# Patient Record
Sex: MALE | Race: WHITE | Employment: FULL TIME | URBAN - METROPOLITAN AREA
[De-identification: names, ages, dates, MRNs, and addresses within clinical notes are randomized per-mention and may not be internally consistent; named-entity substitution may affect disease eponyms.]

---

## 2017-01-03 DIAGNOSIS — I10 ESSENTIAL HYPERTENSION: Primary | ICD-10-CM

## 2017-01-03 RX ORDER — BENAZEPRIL HYDROCHLORIDE 10 MG/1
TABLET ORAL
Qty: 30 TABLET | Refills: 3 | Status: SHIPPED | OUTPATIENT
Start: 2017-01-03 | End: 2017-05-19

## 2017-01-04 ENCOUNTER — TELEPHONE (OUTPATIENT)
Dept: INTERNAL MEDICINE CLINIC | Facility: CLINIC | Age: 52
End: 2017-01-04

## 2017-01-04 DIAGNOSIS — N52.9 ERECTILE DYSFUNCTION, UNSPECIFIED ERECTILE DYSFUNCTION TYPE: Primary | ICD-10-CM

## 2017-01-04 RX ORDER — SILDENAFIL 100 MG/1
100 TABLET, FILM COATED ORAL AS NEEDED
Qty: 8 TABLET | Refills: 3 | Status: SHIPPED | OUTPATIENT
Start: 2017-01-04 | End: 2017-01-11

## 2017-01-04 NOTE — TELEPHONE ENCOUNTER
Spoke with patient and he states we have never sent an actual Rx to pharmacy for Viagra because we have been providing him with samples.    He was advised that insurance usually does not approve for a 30 day supply, but rather a quantity of about 6-12 at a

## 2017-01-05 ENCOUNTER — TELEPHONE (OUTPATIENT)
Dept: INTERNAL MEDICINE CLINIC | Facility: CLINIC | Age: 52
End: 2017-01-05

## 2017-01-05 DIAGNOSIS — N52.9 ERECTILE DYSFUNCTION, UNSPECIFIED ERECTILE DYSFUNCTION TYPE: Primary | ICD-10-CM

## 2017-01-11 RX ORDER — SILDENAFIL 100 MG/1
100 TABLET, FILM COATED ORAL AS NEEDED
Qty: 4 TABLET | Refills: 3 | Status: SHIPPED | OUTPATIENT
Start: 2017-01-11 | End: 2017-11-07

## 2017-01-11 NOTE — TELEPHONE ENCOUNTER
The pharmacy operations department, [facility], Pastor Mar, called and notified the denail of Viagra because it is limited to 4 units per 30 days due to dx provided. Pastor Mar stated our office will receive a fax notification too.  She can be reached at [phone]

## 2017-01-12 ENCOUNTER — OFFICE VISIT (OUTPATIENT)
Dept: HEMATOLOGY/ONCOLOGY | Facility: HOSPITAL | Age: 52
End: 2017-01-12
Attending: INTERNAL MEDICINE
Payer: COMMERCIAL

## 2017-01-12 VITALS
HEART RATE: 96 BPM | BODY MASS INDEX: 31.93 KG/M2 | TEMPERATURE: 98 F | RESPIRATION RATE: 18 BRPM | WEIGHT: 248.81 LBS | SYSTOLIC BLOOD PRESSURE: 119 MMHG | HEIGHT: 74.02 IN | DIASTOLIC BLOOD PRESSURE: 82 MMHG

## 2017-01-12 DIAGNOSIS — R59.9 ADENOPATHY: Primary | ICD-10-CM

## 2017-01-12 DIAGNOSIS — C91.10 CLL (CHRONIC LYMPHOCYTIC LEUKEMIA) (HCC): ICD-10-CM

## 2017-01-12 DIAGNOSIS — R16.2 HEPATOSPLENOMEGALY: ICD-10-CM

## 2017-01-12 DIAGNOSIS — D72.820 LYMPHOCYTOSIS: ICD-10-CM

## 2017-01-12 LAB
ALBUMIN SERPL-MCNC: 4.3 G/DL (ref 3.5–4.8)
ALP LIVER SERPL-CCNC: 77 U/L (ref 45–117)
ALT SERPL-CCNC: 30 U/L (ref 17–63)
AST SERPL-CCNC: 22 U/L (ref 15–41)
BASOPHILS # BLD AUTO: 0.22 X10(3) UL (ref 0–0.1)
BASOPHILS NFR BLD AUTO: 0.2 %
BILIRUB SERPL-MCNC: 0.4 MG/DL (ref 0.1–2)
BUN BLD-MCNC: 18 MG/DL (ref 8–20)
CALCIUM BLD-MCNC: 8.6 MG/DL (ref 8.3–10.3)
CHLORIDE: 106 MMOL/L (ref 101–111)
CO2: 24 MMOL/L (ref 22–32)
CREAT BLD-MCNC: 0.96 MG/DL (ref 0.7–1.3)
EOSINOPHIL # BLD AUTO: 0.53 X10(3) UL (ref 0–0.3)
EOSINOPHIL NFR BLD AUTO: 0.5 %
ERYTHROCYTE [DISTWIDTH] IN BLOOD BY AUTOMATED COUNT: 12.5 % (ref 11.5–16)
GLUCOSE BLD-MCNC: 111 MG/DL (ref 70–99)
HCT VFR BLD AUTO: 42.8 % (ref 37–53)
HGB BLD-MCNC: 14 G/DL (ref 13–17)
IMMATURE GRANULOCYTE COUNT: 0.15 X10(3) UL (ref 0–1)
IMMATURE GRANULOCYTE RATIO %: 0.2 %
LDH: 175 U/L (ref 84–249)
LYMPHOCYTES # BLD AUTO: 90.53 X10(3) UL (ref 0.9–4)
LYMPHOCYTES NFR BLD AUTO: 92.6 %
M PROTEIN MFR SERPL ELPH: 6.9 G/DL (ref 6.1–8.3)
MCH RBC QN AUTO: 29.5 PG (ref 27–33.2)
MCHC RBC AUTO-ENTMCNC: 32.7 G/DL (ref 31–37)
MCV RBC AUTO: 90.1 FL (ref 80–99)
MONOCYTES # BLD AUTO: 1.66 X10(3) UL (ref 0.1–0.6)
MONOCYTES NFR BLD AUTO: 1.7 %
NEUTROPHIL ABS PRELIM: 4.69 X10 (3) UL (ref 1.3–6.7)
NEUTROPHILS # BLD AUTO: 4.69 X10(3) UL (ref 1.3–6.7)
NEUTROPHILS NFR BLD AUTO: 4.8 %
PLATELET # BLD AUTO: 231 10(3)UL (ref 150–450)
PLATELET MORPHOLOGY: NORMAL
POTASSIUM SERPL-SCNC: 4.8 MMOL/L (ref 3.6–5.1)
RBC # BLD AUTO: 4.75 X10(6)UL (ref 4.3–5.7)
RED CELL DISTRIBUTION WIDTH-SD: 41 FL (ref 35.1–46.3)
SODIUM SERPL-SCNC: 139 MMOL/L (ref 136–144)
WBC # BLD AUTO: 97.8 X10(3) UL (ref 4–13)

## 2017-01-12 PROCEDURE — 99214 OFFICE O/P EST MOD 30 MIN: CPT | Performed by: INTERNAL MEDICINE

## 2017-01-12 NOTE — PROGRESS NOTES
Education Record    Learner:  Patient    Disease / Diagnosis:    Barriers / Limitations:  None   Comments:    Method:  Reinforcement   Comments:    General Topics:  Plan of care reviewed   Comments:    Outcome:  Shows understanding   Comments:

## 2017-01-12 NOTE — PROGRESS NOTES
Cancer Center Progress Note    Patient Name: Landen Manley   YOB: 1965   Medical Record Number: [id number]   CSN: [id number]   Attending Physician: Jerardo Zamora M.D.    Referring Physician: Gilda Chen MD      Date of Visit: 1/12/2017     ANA 90 tablet, Rfl: 1  •  Multiple Vitamins-Minerals (CENTRUM SILVER ULTRA MENS) Oral Tab, Take  by mouth., Disp: , Rfl:   •  Blood Glucose Monitoring Suppl (CONTOUR BLOOD GLUCOSE SYSTEM) Does not apply Device, by Does not apply route., Disp: , Rfl:   •  Gluco Social History Narrative         Allergies:    Codeine                     Comment:nausea  Dust                    Shortness of Breath  Mold                    Shortness of Breath  Pollen                  Shortness of Breath     Review of Systems:  A 1 Range: 13.0-17.0 g/dL 14.0   Hematocrit Latest Ref Range: 37.0-53.0 % 42.8   Platelet Count Latest Ref Range: 150.0-450.0 10(3)uL 231.0   RBC Latest Ref Range: 4.30-5.70 x10(6)uL 4.75   Mean Corpuscular Hemoglobin Latest Ref Range: 27.0-33.2 pg 29.5   Mean or depression. We discussed issues of distress, coping difficulties and social support systems and currently there are no active problems.     Maurice Graves MD

## 2017-01-19 ENCOUNTER — TELEPHONE (OUTPATIENT)
Dept: INTERNAL MEDICINE CLINIC | Facility: CLINIC | Age: 52
End: 2017-01-19

## 2017-01-19 NOTE — TELEPHONE ENCOUNTER
PA completed 1/5/2017:     The pharmacy operations department, [facility], Darlene Baltazar, called and notified the denail of Viagra because it is limited to 4 units per 30 days due to dx provided. Darlene Baltazar stated our office will receive a fax notification too.  She can

## 2017-02-02 DIAGNOSIS — J45.40 MODERATE PERSISTENT ASTHMA WITHOUT COMPLICATION: Primary | ICD-10-CM

## 2017-02-02 RX ORDER — FLUTICASONE PROPIONATE 250 UG/1
POWDER, METERED RESPIRATORY (INHALATION)
Qty: 60 EACH | Refills: 1 | Status: SHIPPED | OUTPATIENT
Start: 2017-02-02 | End: 2017-05-19

## 2017-03-23 ENCOUNTER — TELEPHONE (OUTPATIENT)
Dept: INTERNAL MEDICINE CLINIC | Facility: CLINIC | Age: 52
End: 2017-03-23

## 2017-03-23 DIAGNOSIS — I10 ESSENTIAL HYPERTENSION: ICD-10-CM

## 2017-03-23 DIAGNOSIS — E11.9 TYPE 2 DIABETES MELLITUS WITHOUT COMPLICATION, WITHOUT LONG-TERM CURRENT USE OF INSULIN (HCC): Primary | ICD-10-CM

## 2017-03-23 DIAGNOSIS — C91.10 CLL (CHRONIC LYMPHOCYTIC LEUKEMIA) (HCC): ICD-10-CM

## 2017-03-23 DIAGNOSIS — E78.2 MIXED HYPERLIPIDEMIA: ICD-10-CM

## 2017-03-23 DIAGNOSIS — N52.9 ERECTILE DYSFUNCTION, UNSPECIFIED ERECTILE DYSFUNCTION TYPE: ICD-10-CM

## 2017-03-23 NOTE — TELEPHONE ENCOUNTER
Pt needs orders placed for his lab work - the orders on file are from another doctor's office.  Please call pt when the orders are in

## 2017-04-01 ENCOUNTER — LAB ENCOUNTER (OUTPATIENT)
Dept: LAB | Age: 52
End: 2017-04-01
Attending: INTERNAL MEDICINE
Payer: COMMERCIAL

## 2017-04-01 DIAGNOSIS — N52.9 ERECTILE DYSFUNCTION, UNSPECIFIED ERECTILE DYSFUNCTION TYPE: ICD-10-CM

## 2017-04-01 DIAGNOSIS — E78.2 MIXED HYPERLIPIDEMIA: ICD-10-CM

## 2017-04-01 DIAGNOSIS — I10 ESSENTIAL HYPERTENSION: ICD-10-CM

## 2017-04-01 DIAGNOSIS — E11.9 TYPE 2 DIABETES MELLITUS WITHOUT COMPLICATION, WITHOUT LONG-TERM CURRENT USE OF INSULIN (HCC): ICD-10-CM

## 2017-04-01 DIAGNOSIS — C91.10 CLL (CHRONIC LYMPHOCYTIC LEUKEMIA) (HCC): ICD-10-CM

## 2017-04-01 PROCEDURE — 80053 COMPREHEN METABOLIC PANEL: CPT | Performed by: INTERNAL MEDICINE

## 2017-04-01 PROCEDURE — 36415 COLL VENOUS BLD VENIPUNCTURE: CPT | Performed by: INTERNAL MEDICINE

## 2017-04-01 PROCEDURE — 85025 COMPLETE CBC W/AUTO DIFF WBC: CPT | Performed by: INTERNAL MEDICINE

## 2017-04-01 PROCEDURE — 81003 URINALYSIS AUTO W/O SCOPE: CPT | Performed by: INTERNAL MEDICINE

## 2017-04-01 PROCEDURE — 84153 ASSAY OF PSA TOTAL: CPT | Performed by: INTERNAL MEDICINE

## 2017-04-01 PROCEDURE — 82043 UR ALBUMIN QUANTITATIVE: CPT | Performed by: INTERNAL MEDICINE

## 2017-04-01 PROCEDURE — 82570 ASSAY OF URINE CREATININE: CPT | Performed by: INTERNAL MEDICINE

## 2017-04-01 PROCEDURE — 80061 LIPID PANEL: CPT | Performed by: INTERNAL MEDICINE

## 2017-04-14 ENCOUNTER — APPOINTMENT (OUTPATIENT)
Dept: LAB | Age: 52
End: 2017-04-14
Attending: INTERNAL MEDICINE
Payer: COMMERCIAL

## 2017-04-14 ENCOUNTER — OFFICE VISIT (OUTPATIENT)
Dept: INTERNAL MEDICINE CLINIC | Facility: CLINIC | Age: 52
End: 2017-04-14

## 2017-04-14 VITALS
OXYGEN SATURATION: 98 % | TEMPERATURE: 98 F | HEIGHT: 74.02 IN | WEIGHT: 250 LBS | BODY MASS INDEX: 32.08 KG/M2 | RESPIRATION RATE: 16 BRPM | SYSTOLIC BLOOD PRESSURE: 100 MMHG | DIASTOLIC BLOOD PRESSURE: 64 MMHG | HEART RATE: 88 BPM

## 2017-04-14 DIAGNOSIS — E11.9 TYPE 2 DIABETES MELLITUS WITHOUT COMPLICATION, WITHOUT LONG-TERM CURRENT USE OF INSULIN (HCC): ICD-10-CM

## 2017-04-14 DIAGNOSIS — Z00.00 PHYSICAL EXAM, ANNUAL: Primary | ICD-10-CM

## 2017-04-14 DIAGNOSIS — E78.2 MIXED HYPERLIPIDEMIA: ICD-10-CM

## 2017-04-14 DIAGNOSIS — J45.40 MODERATE PERSISTENT ASTHMA WITHOUT COMPLICATION: ICD-10-CM

## 2017-04-14 PROCEDURE — 36415 COLL VENOUS BLD VENIPUNCTURE: CPT | Performed by: INTERNAL MEDICINE

## 2017-04-14 PROCEDURE — 99396 PREV VISIT EST AGE 40-64: CPT | Performed by: INTERNAL MEDICINE

## 2017-04-14 PROCEDURE — 83036 HEMOGLOBIN GLYCOSYLATED A1C: CPT | Performed by: INTERNAL MEDICINE

## 2017-04-14 RX ORDER — ATORVASTATIN CALCIUM 40 MG/1
40 TABLET, FILM COATED ORAL DAILY
Qty: 90 TABLET | Refills: 1 | Status: SHIPPED | OUTPATIENT
Start: 2017-04-14 | End: 2017-10-12

## 2017-04-14 NOTE — PATIENT INSTRUCTIONS
Prevention Guidelines, Men Ages 48 to 59  Screening tests and vaccines are an important part of managing your health. Health counseling is essential, too. Below are guidelines for these, for men ages 48 to 59.  Talk with your healthcare provider to make s Syphilis Men at increased risk for infection – talk with your healthcare provider At routine exams   Tuberculosis Men at increased risk for infection – talk with your healthcare provider Ask your healthcare provider   Vision All men in this age group Ask y Counseling Who needs it How often   Diet and exercise Men who are overweight or obese When diagnosed, and then at routine exams   Sexually transmitted infection prevention Men at increased risk for infection – talk with your healthcare provider At routine

## 2017-04-14 NOTE — PROGRESS NOTES
HPI:    Patient ID: Aixa Delgado is a 46year old male. HPI  Aixa Delgado is a 46year old male who presents for a complete physical exam.   HPI:   Pt complains of nothing  Doing well on dm2 therapy.  No hypoglycemia  CLL managed by Dr. Enrique morgan HFA) 108 (90 BASE) MCG/ACT Inhalation Aero Soln INHALE 2 PUFFS EVERY 6 HOURS AS NEEDED FOR WHEEZING Disp: 1 Inhaler Rfl: 0   MetFORMIN HCl 850 MG Oral Tab TAKE 1 TABLET BY MOUTH TWO TIMES A DAY WITH MEALS Disp: 180 tablet Rfl: 1   Benazepril HCl 10 MG Oral daily - does not inhale    Alcohol Use: Yes                Comment: 4-6 Beers/week     Occ: yes. : yes. .   Exercise: once per week,  twice per week.   Diet: watches minimally     REVIEW OF SYSTEMS:   GENERAL: feels well otherwise  SKIN: denies any u exercise 30 minutes three times weekly. Health maintenance, reviewed fasting Lipids, CMP, CBC and PSA. Check a1c. UTD with screening colonoscopy. Pt info handouts given for: exercise, low fat diet, testicular self exam and prostate cancer screening.  The pa Comment:nausea  Dust                    Shortness of Breath  Mold                    Shortness of Breath  Pollen                  Shortness of Breath   PHYSICAL EXAM:   Physical Exam           ASSESSMENT/PLAN:   Physical exam, annual  (primary encounter di

## 2017-05-02 ENCOUNTER — TELEPHONE (OUTPATIENT)
Dept: INTERNAL MEDICINE CLINIC | Facility: CLINIC | Age: 52
End: 2017-05-02

## 2017-05-02 NOTE — TELEPHONE ENCOUNTER
Reviewed with the pt asthma Rx treatment. A copy of the AAP will be mailed to the pt's home address once the plan is approved by Dr. Zuleima Hernandez.

## 2017-05-19 DIAGNOSIS — E11.9 TYPE 2 DIABETES MELLITUS WITHOUT COMPLICATION, WITHOUT LONG-TERM CURRENT USE OF INSULIN (HCC): ICD-10-CM

## 2017-05-19 DIAGNOSIS — I10 ESSENTIAL HYPERTENSION: Primary | ICD-10-CM

## 2017-05-19 DIAGNOSIS — J45.40 MODERATE PERSISTENT ASTHMA WITHOUT COMPLICATION: ICD-10-CM

## 2017-05-19 RX ORDER — BENAZEPRIL HYDROCHLORIDE 10 MG/1
TABLET ORAL
Qty: 30 TABLET | Refills: 2 | Status: SHIPPED | OUTPATIENT
Start: 2017-05-19 | End: 2017-06-06

## 2017-05-19 RX ORDER — FLUTICASONE PROPIONATE 250 UG/1
POWDER, METERED RESPIRATORY (INHALATION)
Qty: 60 EACH | Refills: 2 | Status: SHIPPED | OUTPATIENT
Start: 2017-05-19 | End: 2017-11-28

## 2017-06-06 ENCOUNTER — APPOINTMENT (OUTPATIENT)
Dept: CT IMAGING | Age: 52
End: 2017-06-06
Attending: EMERGENCY MEDICINE
Payer: COMMERCIAL

## 2017-06-06 ENCOUNTER — HOSPITAL ENCOUNTER (EMERGENCY)
Age: 52
Discharge: HOME | End: 2017-06-06
Attending: EMERGENCY MEDICINE
Payer: COMMERCIAL

## 2017-06-06 ENCOUNTER — OFFICE VISIT (OUTPATIENT)
Dept: FAMILY MEDICINE CLINIC | Facility: CLINIC | Age: 52
End: 2017-06-06

## 2017-06-06 VITALS
TEMPERATURE: 98 F | HEART RATE: 74 BPM | SYSTOLIC BLOOD PRESSURE: 111 MMHG | WEIGHT: 250 LBS | RESPIRATION RATE: 18 BRPM | BODY MASS INDEX: 32.08 KG/M2 | DIASTOLIC BLOOD PRESSURE: 64 MMHG | OXYGEN SATURATION: 100 % | HEIGHT: 74 IN

## 2017-06-06 VITALS
TEMPERATURE: 98 F | WEIGHT: 253 LBS | RESPIRATION RATE: 16 BRPM | HEART RATE: 86 BPM | BODY MASS INDEX: 32 KG/M2 | OXYGEN SATURATION: 99 % | SYSTOLIC BLOOD PRESSURE: 122 MMHG | DIASTOLIC BLOOD PRESSURE: 74 MMHG

## 2017-06-06 DIAGNOSIS — Z02.9 ENCOUNTERS FOR ADMINISTRATIVE PURPOSE: Primary | ICD-10-CM

## 2017-06-06 DIAGNOSIS — N23 RENAL COLIC: Primary | ICD-10-CM

## 2017-06-06 PROCEDURE — 99284 EMERGENCY DEPT VISIT MOD MDM: CPT

## 2017-06-06 PROCEDURE — 96361 HYDRATE IV INFUSION ADD-ON: CPT

## 2017-06-06 PROCEDURE — 80048 BASIC METABOLIC PNL TOTAL CA: CPT | Performed by: EMERGENCY MEDICINE

## 2017-06-06 PROCEDURE — 74176 CT ABD & PELVIS W/O CONTRAST: CPT | Performed by: EMERGENCY MEDICINE

## 2017-06-06 PROCEDURE — 96374 THER/PROPH/DIAG INJ IV PUSH: CPT

## 2017-06-06 PROCEDURE — 81001 URINALYSIS AUTO W/SCOPE: CPT | Performed by: EMERGENCY MEDICINE

## 2017-06-06 RX ORDER — ONDANSETRON 2 MG/ML
4 INJECTION INTRAMUSCULAR; INTRAVENOUS
Status: DISCONTINUED | OUTPATIENT
Start: 2017-06-06 | End: 2017-06-06

## 2017-06-06 RX ORDER — HYDROMORPHONE HYDROCHLORIDE 1 MG/ML
1 INJECTION, SOLUTION INTRAMUSCULAR; INTRAVENOUS; SUBCUTANEOUS EVERY 30 MIN PRN
Status: DISCONTINUED | OUTPATIENT
Start: 2017-06-06 | End: 2017-06-06

## 2017-06-06 RX ORDER — SODIUM CHLORIDE 9 MG/ML
INJECTION, SOLUTION INTRAVENOUS ONCE
Status: COMPLETED | OUTPATIENT
Start: 2017-06-06 | End: 2017-06-06

## 2017-06-06 RX ORDER — TAMSULOSIN HYDROCHLORIDE 0.4 MG/1
0.4 CAPSULE ORAL DAILY
Qty: 7 CAPSULE | Refills: 0 | Status: SHIPPED | OUTPATIENT
Start: 2017-06-06 | End: 2017-06-13

## 2017-06-06 RX ORDER — ONDANSETRON 8 MG/1
8 TABLET, ORALLY DISINTEGRATING ORAL EVERY 6 HOURS PRN
Qty: 10 TABLET | Refills: 0 | Status: SHIPPED | OUTPATIENT
Start: 2017-06-06 | End: 2017-06-13

## 2017-06-06 RX ORDER — HYDROCODONE BITARTRATE AND ACETAMINOPHEN 5; 325 MG/1; MG/1
1-2 TABLET ORAL EVERY 6 HOURS PRN
Qty: 20 TABLET | Refills: 0 | Status: SHIPPED | OUTPATIENT
Start: 2017-06-06 | End: 2017-06-16

## 2017-06-06 NOTE — ED PROVIDER NOTES
Patient Seen in: [facility] In [place]    History   Patient presents with:  Abdomen/Flank Pain (GI/)    Stated Complaint: lower back pain this am, now abd pain into testicle    HPI    Patient awoke this morning with some lower back Oral Tab,  TAKE 1 TABLET BY MOUTH TWO TIMES A DAY WITH MEALS   FLOVENT DISKUS 250 MCG/BLIST Inhalation Aerosol Powder, Breath Activated,  INHALE 1 PUFF BY MOUTH TWO TIMES A DAY    Atorvastatin Calcium 40 MG Oral Tab,  Take 1 tablet (40 mg total) by mouth d °C) (Temporal)  Resp 18  Ht 188 cm (6' 2\")  Wt 113.399 kg  BMI 32.08 kg/m2  SpO2 100%        Physical Exam  General: The patient is awake, alert, conversant. Patient answers questions quickly and appropriately.   Eyes: sclera white, conjunctiva pink and m oncologist.  Patient is already scheduled appointments for these    On repeat examination, patient appeared more comfortable. At this point, I believe he may be safely discharged home.   Recommend rest, fluids, straining urine, anti-inflammatory ibuprofen

## 2017-06-06 NOTE — PROGRESS NOTES
Patient reports acute onset of left sided flank pain radiating to left groin and left scrotum. Patient also reports some mild dizziness that was improved with water.   Pt denies fever, urinary pain/frequency/urgency, penile discharge, scrotal edema or eryt

## 2017-07-13 ENCOUNTER — TELEPHONE (OUTPATIENT)
Dept: HEMATOLOGY/ONCOLOGY | Facility: HOSPITAL | Age: 52
End: 2017-07-13

## 2017-07-13 ENCOUNTER — OFFICE VISIT (OUTPATIENT)
Dept: HEMATOLOGY/ONCOLOGY | Facility: HOSPITAL | Age: 52
End: 2017-07-13
Attending: INTERNAL MEDICINE
Payer: COMMERCIAL

## 2017-07-13 VITALS
BODY MASS INDEX: 31.83 KG/M2 | DIASTOLIC BLOOD PRESSURE: 74 MMHG | SYSTOLIC BLOOD PRESSURE: 124 MMHG | HEIGHT: 74.02 IN | WEIGHT: 248 LBS | OXYGEN SATURATION: 97 % | HEART RATE: 90 BPM | RESPIRATION RATE: 18 BRPM | TEMPERATURE: 98 F

## 2017-07-13 DIAGNOSIS — D72.820 LYMPHOCYTOSIS: ICD-10-CM

## 2017-07-13 DIAGNOSIS — R59.9 ADENOPATHY: Primary | ICD-10-CM

## 2017-07-13 DIAGNOSIS — R16.2 HEPATOSPLENOMEGALY: ICD-10-CM

## 2017-07-13 DIAGNOSIS — C91.10 CLL (CHRONIC LYMPHOCYTIC LEUKEMIA) (HCC): ICD-10-CM

## 2017-07-13 LAB
ALBUMIN SERPL-MCNC: 4.3 G/DL (ref 3.5–4.8)
ALP LIVER SERPL-CCNC: 74 U/L (ref 45–117)
ALT SERPL-CCNC: 32 U/L (ref 17–63)
AST SERPL-CCNC: 20 U/L (ref 15–41)
BASOPHILS # BLD AUTO: 0.31 X10(3) UL (ref 0–0.1)
BASOPHILS NFR BLD AUTO: 0.3 %
BILIRUB SERPL-MCNC: 0.4 MG/DL (ref 0.1–2)
BUN BLD-MCNC: 20 MG/DL (ref 8–20)
CALCIUM BLD-MCNC: 9.2 MG/DL (ref 8.3–10.3)
CHLORIDE: 107 MMOL/L (ref 101–111)
CO2: 28 MMOL/L (ref 22–32)
CREAT BLD-MCNC: 1.01 MG/DL (ref 0.7–1.3)
EOSINOPHIL # BLD AUTO: 0.48 X10(3) UL (ref 0–0.3)
EOSINOPHIL NFR BLD AUTO: 0.5 %
ERYTHROCYTE [DISTWIDTH] IN BLOOD BY AUTOMATED COUNT: 12.3 % (ref 11.5–16)
GLUCOSE BLD-MCNC: 111 MG/DL (ref 70–99)
HCT VFR BLD AUTO: 42.3 % (ref 37–53)
HGB BLD-MCNC: 13.6 G/DL (ref 13–17)
IMMATURE GRANULOCYTE COUNT: 0.16 X10(3) UL (ref 0–1)
IMMATURE GRANULOCYTE RATIO %: 0.2 %
LDH: 158 U/L (ref 84–249)
LYMPHOCYTES # BLD AUTO: 97.25 X10(3) UL (ref 0.9–4)
LYMPHOCYTES NFR BLD AUTO: 91.9 %
M PROTEIN MFR SERPL ELPH: 7 G/DL (ref 6.1–8.3)
MCH RBC QN AUTO: 29.1 PG (ref 27–33.2)
MCHC RBC AUTO-ENTMCNC: 32.2 G/DL (ref 31–37)
MCV RBC AUTO: 90.4 FL (ref 80–99)
MONOCYTES # BLD AUTO: 2.81 X10(3) UL (ref 0.1–0.6)
MONOCYTES NFR BLD AUTO: 2.7 %
NEUTROPHIL ABS PRELIM: 4.79 X10 (3) UL (ref 1.3–6.7)
NEUTROPHILS # BLD AUTO: 4.79 X10(3) UL (ref 1.3–6.7)
NEUTROPHILS NFR BLD AUTO: 4.4 %
PLATELET # BLD AUTO: 209 10(3)UL (ref 150–450)
POTASSIUM SERPL-SCNC: 4.8 MMOL/L (ref 3.6–5.1)
RBC # BLD AUTO: 4.68 X10(6)UL (ref 4.3–5.7)
RED CELL DISTRIBUTION WIDTH-SD: 40.6 FL (ref 35.1–46.3)
SODIUM SERPL-SCNC: 140 MMOL/L (ref 136–144)
WBC # BLD AUTO: 105.8 X10(3) UL (ref 4–13)

## 2017-07-13 PROCEDURE — 99214 OFFICE O/P EST MOD 30 MIN: CPT | Performed by: INTERNAL MEDICINE

## 2017-07-13 NOTE — PROGRESS NOTES
Cancer Center Progress Note    Patient Name: Gricel Faust   YOB: 1965   Medical Record Number: [id number]   CSN: [id number]   Attending Physician: Delon Kaplan M.D.    Referring Physician: Salome Saha MD      Date of Visit: 7/13/2017     ANA Rfl: 3  •  Benazepril HCl 10 MG Oral Tab, TAKE 1 TABLET BY MOUTH ONE TIME A DAY, Disp: 180 tablet, Rfl: 1  •  aspirin 81 MG Oral Tab, Take 1 tablet by mouth daily. , Disp: , Rfl: 0  •  Fexofenadine HCl (WAL-FEX ALLERGY) 180 MG Oral Tab, Take 1 tablet by rigoberto Allergies:    Codeine                     Comment:nausea  Dust                    Shortness of Breath  Mold                    Shortness of Breath  Pollen                  Shortness of Breath     Review of Systems:  A 14-point ROS was done with per unremarkable. ADRENALS:  Normal.  No mass or enlargement. LIVER:  Low attenuation diffusely throughout the liver consistent with attrition. BILIARY:  Normal.  No visible dilatation or calcification.     PANCREAS:  Normal.  No lesion, fluid collection, mmol/L 28.0   BUN Latest Ref Range: 8 - 20 mg/dL 20   CREATININE Latest Ref Range: 0.70 - 1.30 mg/dL 1.01   CALCIUM Latest Ref Range: 8.3 - 10.3 mg/dL 9.2   GFR Latest Ref Range: >=60  85   ALKALINE PHOSPHATASE Latest Ref Range: 45 - 117 U/L 74   AST (SGOT recent imaging reviewed. Continued increase in white count though doubling time has slowed down and rest of blood counts normal. HSM and nodes stable on recent CT w/o evidence of end organ damage.  Aware of signs and symptoms to watch out for and indication

## 2017-08-22 DIAGNOSIS — I10 ESSENTIAL HYPERTENSION WITH GOAL BLOOD PRESSURE LESS THAN 130/80: ICD-10-CM

## 2017-08-22 DIAGNOSIS — E11.9 TYPE 2 DIABETES MELLITUS WITHOUT COMPLICATION, WITHOUT LONG-TERM CURRENT USE OF INSULIN (HCC): ICD-10-CM

## 2017-08-22 RX ORDER — BENAZEPRIL HYDROCHLORIDE 10 MG/1
TABLET ORAL
Qty: 30 TABLET | Refills: 2 | Status: SHIPPED | OUTPATIENT
Start: 2017-08-22 | End: 2017-11-22

## 2017-09-27 ENCOUNTER — TELEPHONE (OUTPATIENT)
Dept: INTERNAL MEDICINE CLINIC | Facility: CLINIC | Age: 52
End: 2017-09-27

## 2017-09-27 DIAGNOSIS — I10 ESSENTIAL HYPERTENSION: Primary | ICD-10-CM

## 2017-09-27 DIAGNOSIS — E78.2 MIXED HYPERLIPIDEMIA: ICD-10-CM

## 2017-09-27 DIAGNOSIS — E11.9 TYPE 2 DIABETES MELLITUS WITHOUT COMPLICATION, WITHOUT LONG-TERM CURRENT USE OF INSULIN (HCC): ICD-10-CM

## 2017-10-07 ENCOUNTER — APPOINTMENT (OUTPATIENT)
Dept: LAB | Age: 52
End: 2017-10-07
Attending: INTERNAL MEDICINE
Payer: COMMERCIAL

## 2017-10-07 DIAGNOSIS — E78.2 MIXED HYPERLIPIDEMIA: ICD-10-CM

## 2017-10-07 DIAGNOSIS — E11.9 TYPE 2 DIABETES MELLITUS WITHOUT COMPLICATION, WITHOUT LONG-TERM CURRENT USE OF INSULIN (HCC): ICD-10-CM

## 2017-10-07 DIAGNOSIS — I10 ESSENTIAL HYPERTENSION: ICD-10-CM

## 2017-10-07 PROCEDURE — 80061 LIPID PANEL: CPT | Performed by: INTERNAL MEDICINE

## 2017-10-07 PROCEDURE — 83036 HEMOGLOBIN GLYCOSYLATED A1C: CPT | Performed by: INTERNAL MEDICINE

## 2017-10-07 PROCEDURE — 36415 COLL VENOUS BLD VENIPUNCTURE: CPT | Performed by: INTERNAL MEDICINE

## 2017-10-07 PROCEDURE — 80053 COMPREHEN METABOLIC PANEL: CPT | Performed by: INTERNAL MEDICINE

## 2017-10-12 ENCOUNTER — OFFICE VISIT (OUTPATIENT)
Dept: INTERNAL MEDICINE CLINIC | Facility: CLINIC | Age: 52
End: 2017-10-12

## 2017-10-12 VITALS
HEIGHT: 74 IN | TEMPERATURE: 98 F | SYSTOLIC BLOOD PRESSURE: 112 MMHG | RESPIRATION RATE: 16 BRPM | DIASTOLIC BLOOD PRESSURE: 74 MMHG | BODY MASS INDEX: 31.32 KG/M2 | OXYGEN SATURATION: 98 % | WEIGHT: 244 LBS | HEART RATE: 87 BPM

## 2017-10-12 DIAGNOSIS — I10 ESSENTIAL HYPERTENSION: ICD-10-CM

## 2017-10-12 DIAGNOSIS — Z23 NEED FOR INFLUENZA VACCINATION: ICD-10-CM

## 2017-10-12 DIAGNOSIS — E11.9 TYPE 2 DIABETES MELLITUS WITHOUT COMPLICATION, WITHOUT LONG-TERM CURRENT USE OF INSULIN (HCC): Primary | ICD-10-CM

## 2017-10-12 DIAGNOSIS — J45.40 MODERATE PERSISTENT ASTHMA WITHOUT COMPLICATION: ICD-10-CM

## 2017-10-12 DIAGNOSIS — E78.2 MIXED HYPERLIPIDEMIA: ICD-10-CM

## 2017-10-12 PROCEDURE — 90471 IMMUNIZATION ADMIN: CPT | Performed by: INTERNAL MEDICINE

## 2017-10-12 PROCEDURE — 99214 OFFICE O/P EST MOD 30 MIN: CPT | Performed by: INTERNAL MEDICINE

## 2017-10-12 PROCEDURE — 90686 IIV4 VACC NO PRSV 0.5 ML IM: CPT | Performed by: INTERNAL MEDICINE

## 2017-10-12 RX ORDER — ATORVASTATIN CALCIUM 40 MG/1
40 TABLET, FILM COATED ORAL DAILY
Qty: 90 TABLET | Refills: 1 | Status: SHIPPED | OUTPATIENT
Start: 2017-10-12 | End: 2018-04-20

## 2017-10-13 NOTE — PROGRESS NOTES
HPI:    Patient ID: Enrico Orantes is a 46year old male. HPI  HPI:   Enrico Orantes is a 46year old male who presents for recheck of his diabetes, htn and hld and asthma. Patient’s FBS have been 115's. Last visit with ophthalmologist was 7 m ago.   Pt has Rfl:    FLOVENT DISKUS 250 MCG/BLIST Inhalation Aerosol Powder, Breath Activated INHALE 1 PUFF BY MOUTH TWO TIMES A DAY  Disp: 60 each Rfl: 2   Sildenafil Citrate (VIAGRA) 100 MG Oral Tab Take 1 tablet (100 mg total) by mouth as needed for Erectile Dysfunc 97.7 °F (36.5 °C) (Oral)   Resp 16   Ht 74\"   Wt 244 lb   SpO2 98%   BMI 31.33 kg/m²   GENERAL: well developed, well nourished,in no apparent distress  SKIN: no rashes,no suspicious lesions  NECK: supple,no adenopathy,no bruits  LUNGS: clear to auscultati Take 1 tablet by mouth daily. Disp:  Rfl: 0   Fexofenadine HCl (WAL-FEX ALLERGY) 180 MG Oral Tab Take 1 tablet by mouth once daily. Disp: 90 tablet Rfl: 1   Multiple Vitamins-Minerals (CENTRUM SILVER ULTRA MENS) Oral Tab Take  by mouth.  Disp:  Rfl:      Al

## 2017-10-16 ENCOUNTER — MED REC SCAN ONLY (OUTPATIENT)
Dept: INTERNAL MEDICINE CLINIC | Facility: CLINIC | Age: 52
End: 2017-10-16

## 2017-11-07 DIAGNOSIS — N52.9 ERECTILE DYSFUNCTION, UNSPECIFIED ERECTILE DYSFUNCTION TYPE: ICD-10-CM

## 2017-11-07 RX ORDER — SILDENAFIL 100 MG/1
100 TABLET, FILM COATED ORAL AS NEEDED
Qty: 6 TABLET | Refills: 0 | COMMUNITY
Start: 2017-11-07 | End: 2018-04-20

## 2017-11-07 NOTE — TELEPHONE ENCOUNTER
Patient called and requested Viagra samples. Please call to let patient know if we have any in stock.

## 2017-11-22 DIAGNOSIS — I10 ESSENTIAL HYPERTENSION WITH GOAL BLOOD PRESSURE LESS THAN 130/80: ICD-10-CM

## 2017-11-22 DIAGNOSIS — E11.9 TYPE 2 DIABETES MELLITUS WITHOUT COMPLICATION, WITHOUT LONG-TERM CURRENT USE OF INSULIN (HCC): ICD-10-CM

## 2017-11-22 RX ORDER — BENAZEPRIL HYDROCHLORIDE 10 MG/1
TABLET ORAL
Qty: 30 TABLET | Refills: 5 | Status: SHIPPED | OUTPATIENT
Start: 2017-11-22 | End: 2018-04-20

## 2017-11-28 DIAGNOSIS — J45.40 MODERATE PERSISTENT ASTHMA WITHOUT COMPLICATION: ICD-10-CM

## 2018-01-11 ENCOUNTER — OFFICE VISIT (OUTPATIENT)
Dept: HEMATOLOGY/ONCOLOGY | Facility: HOSPITAL | Age: 53
End: 2018-01-11
Attending: INTERNAL MEDICINE
Payer: COMMERCIAL

## 2018-01-11 VITALS
HEIGHT: 74.02 IN | BODY MASS INDEX: 30.93 KG/M2 | TEMPERATURE: 97 F | RESPIRATION RATE: 18 BRPM | SYSTOLIC BLOOD PRESSURE: 133 MMHG | DIASTOLIC BLOOD PRESSURE: 70 MMHG | HEART RATE: 87 BPM | WEIGHT: 241 LBS

## 2018-01-11 DIAGNOSIS — R59.1 LYMPHADENOPATHY: ICD-10-CM

## 2018-01-11 DIAGNOSIS — R16.2 HEPATOSPLENOMEGALY: ICD-10-CM

## 2018-01-11 DIAGNOSIS — R59.9 ADENOPATHY: Primary | ICD-10-CM

## 2018-01-11 DIAGNOSIS — C91.10 CLL (CHRONIC LYMPHOCYTIC LEUKEMIA) (HCC): ICD-10-CM

## 2018-01-11 DIAGNOSIS — D72.820 LYMPHOCYTOSIS: ICD-10-CM

## 2018-01-11 LAB
ALBUMIN SERPL-MCNC: 4.1 G/DL (ref 3.5–4.8)
ALP LIVER SERPL-CCNC: 85 U/L (ref 45–117)
ALT SERPL-CCNC: 34 U/L (ref 17–63)
AST SERPL-CCNC: 28 U/L (ref 15–41)
BASOPHILS # BLD AUTO: 0.28 X10(3) UL (ref 0–0.1)
BASOPHILS NFR BLD AUTO: 0.2 %
BILIRUB SERPL-MCNC: 0.3 MG/DL (ref 0.1–2)
BUN BLD-MCNC: 17 MG/DL (ref 8–20)
CALCIUM BLD-MCNC: 9.1 MG/DL (ref 8.3–10.3)
CHLORIDE: 106 MMOL/L (ref 101–111)
CO2: 26 MMOL/L (ref 22–32)
CREAT BLD-MCNC: 0.89 MG/DL (ref 0.7–1.3)
EOSINOPHIL # BLD AUTO: 0.26 X10(3) UL (ref 0–0.3)
EOSINOPHIL NFR BLD AUTO: 0.2 %
ERYTHROCYTE [DISTWIDTH] IN BLOOD BY AUTOMATED COUNT: 12.7 % (ref 11.5–16)
GLUCOSE BLD-MCNC: 110 MG/DL (ref 70–99)
HCT VFR BLD AUTO: 40.8 % (ref 37–53)
HGB BLD-MCNC: 12.4 G/DL (ref 13–17)
IMMATURE GRANULOCYTE COUNT: 0.22 X10(3) UL (ref 0–1)
IMMATURE GRANULOCYTE RATIO %: 0.1 %
LDH: 197 U/L (ref 84–249)
LYMPHOCYTES # BLD AUTO: 144.26 X10(3) UL (ref 0.9–4)
LYMPHOCYTES NFR BLD AUTO: 94.7 %
M PROTEIN MFR SERPL ELPH: 6.8 G/DL (ref 6.1–8.3)
MCH RBC QN AUTO: 28.3 PG (ref 27–33.2)
MCHC RBC AUTO-ENTMCNC: 30.4 G/DL (ref 31–37)
MCV RBC AUTO: 93.2 FL (ref 80–99)
MONOCYTES # BLD AUTO: 2.9 X10(3) UL (ref 0.1–0.6)
MONOCYTES NFR BLD AUTO: 1.9 %
NEUTROPHIL ABS PRELIM: 4.37 X10 (3) UL (ref 1.3–6.7)
NEUTROPHILS # BLD AUTO: 4.37 X10(3) UL (ref 1.3–6.7)
NEUTROPHILS NFR BLD AUTO: 2.9 %
PLATELET # BLD AUTO: 222 10(3)UL (ref 150–450)
POTASSIUM SERPL-SCNC: 5.4 MMOL/L (ref 3.6–5.1)
RBC # BLD AUTO: 4.38 X10(6)UL (ref 4.3–5.7)
RED CELL DISTRIBUTION WIDTH-SD: 41.8 FL (ref 35.1–46.3)
SODIUM SERPL-SCNC: 138 MMOL/L (ref 136–144)
WBC # BLD AUTO: 152.3 X10(3) UL (ref 4–13)

## 2018-01-11 PROCEDURE — 99214 OFFICE O/P EST MOD 30 MIN: CPT | Performed by: INTERNAL MEDICINE

## 2018-01-11 NOTE — PROGRESS NOTES
Cancer Center Progress Note    Patient Name: Patricia Guevara   YOB: 1965   Medical Record Number: [id number]   CSN: [id number]   Attending Physician: Cullen Escobar M.D.    Referring Physician: Dolly Snyder MD      Date of Visit: 1/11/2018 Dysfunction. , Disp: 6 tablet, Rfl: 0  •  atorvastatin 40 MG Oral Tab, Take 1 tablet (40 mg total) by mouth daily. , Disp: 90 tablet, Rfl: 1  •  CINNAMON OR, Take by mouth., Disp: , Rfl:   •  aspirin 81 MG Oral Tab, Take 1 tablet by mouth daily. , Disp: , Rfl day/coffee/tea    Exercise No     Social History Narrative   None on file         Allergies:    Codeine                     Comment:nausea  Dust                    Shortness of Breath  Mold                    Shortness of Breath  Pollen                  Sh DIFFERENTIAL   Collection Time: 01/11/18  2:21 PM   Result Value Ref Range   .3 (HH) 4.0 - 13.0 x10(3) uL   RBC 4.38 4.30 - 5.70 x10(6)uL   HGB 12.4 (L) 13.0 - 17.0 g/dL   HCT 40.8 37.0 - 53.0 %   .0 150.0 - 450.0 10(3)uL   MCV 93.2 80.0 - 99 Repeated episodes of infection. Will continue expectant management. Labs reviewed. Pseudohyperkalemia from elevated white count- no intervention needed. Labs in 3 months. RTC 6 months.      Emotional Well Being:  I have assessed the patient's em

## 2018-01-16 ENCOUNTER — TELEPHONE (OUTPATIENT)
Dept: INTERNAL MEDICINE CLINIC | Facility: CLINIC | Age: 53
End: 2018-01-16

## 2018-01-16 DIAGNOSIS — E11.9 TYPE 2 DIABETES MELLITUS WITHOUT COMPLICATION, WITHOUT LONG-TERM CURRENT USE OF INSULIN (HCC): ICD-10-CM

## 2018-01-16 DIAGNOSIS — Z12.5 SPECIAL SCREENING FOR MALIGNANT NEOPLASM OF PROSTATE: ICD-10-CM

## 2018-01-16 DIAGNOSIS — E78.2 MIXED HYPERLIPIDEMIA: ICD-10-CM

## 2018-01-16 DIAGNOSIS — I10 ESSENTIAL HYPERTENSION: Primary | ICD-10-CM

## 2018-01-20 ENCOUNTER — MED REC SCAN ONLY (OUTPATIENT)
Dept: INTERNAL MEDICINE CLINIC | Facility: CLINIC | Age: 53
End: 2018-01-20

## 2018-01-23 DIAGNOSIS — N52.9 ERECTILE DYSFUNCTION, UNSPECIFIED ERECTILE DYSFUNCTION TYPE: ICD-10-CM

## 2018-01-23 RX ORDER — SILDENAFIL CITRATE 100 MG
TABLET ORAL
Qty: 4 TABLET | Refills: 2 | Status: SHIPPED | OUTPATIENT
Start: 2018-01-23 | End: 2018-04-14

## 2018-03-15 ENCOUNTER — PATIENT OUTREACH (OUTPATIENT)
Dept: INTERNAL MEDICINE CLINIC | Facility: CLINIC | Age: 53
End: 2018-03-15

## 2018-03-15 NOTE — PATIENT INSTRUCTIONS
The pt is to have a yearly dilated eye exam completed and have a copy sent to the office for the pt's records.

## 2018-03-15 NOTE — PROGRESS NOTES
Left message for the pt reminding that a dilated eye exam is overdue (last performed on 7/12/2016). The pt will be seen on 4/20/2018. This will also be discussed in person during the appointment.

## 2018-04-07 ENCOUNTER — LAB ENCOUNTER (OUTPATIENT)
Dept: LAB | Age: 53
End: 2018-04-07
Attending: INTERNAL MEDICINE
Payer: COMMERCIAL

## 2018-04-07 DIAGNOSIS — I10 ESSENTIAL HYPERTENSION: ICD-10-CM

## 2018-04-07 DIAGNOSIS — Z12.5 SPECIAL SCREENING FOR MALIGNANT NEOPLASM OF PROSTATE: ICD-10-CM

## 2018-04-07 DIAGNOSIS — E11.9 TYPE 2 DIABETES MELLITUS WITHOUT COMPLICATION, WITHOUT LONG-TERM CURRENT USE OF INSULIN (HCC): ICD-10-CM

## 2018-04-07 DIAGNOSIS — C91.10 CLL (CHRONIC LYMPHOCYTIC LEUKEMIA) (HCC): ICD-10-CM

## 2018-04-07 DIAGNOSIS — E78.2 MIXED HYPERLIPIDEMIA: ICD-10-CM

## 2018-04-07 PROCEDURE — 81003 URINALYSIS AUTO W/O SCOPE: CPT | Performed by: INTERNAL MEDICINE

## 2018-04-07 PROCEDURE — 80061 LIPID PANEL: CPT | Performed by: INTERNAL MEDICINE

## 2018-04-07 PROCEDURE — 80053 COMPREHEN METABOLIC PANEL: CPT | Performed by: INTERNAL MEDICINE

## 2018-04-07 PROCEDURE — 83036 HEMOGLOBIN GLYCOSYLATED A1C: CPT | Performed by: INTERNAL MEDICINE

## 2018-04-07 PROCEDURE — 82570 ASSAY OF URINE CREATININE: CPT | Performed by: INTERNAL MEDICINE

## 2018-04-07 PROCEDURE — 84153 ASSAY OF PSA TOTAL: CPT | Performed by: INTERNAL MEDICINE

## 2018-04-07 PROCEDURE — 82043 UR ALBUMIN QUANTITATIVE: CPT | Performed by: INTERNAL MEDICINE

## 2018-04-14 ENCOUNTER — HOSPITAL ENCOUNTER (OUTPATIENT)
Age: 53
Discharge: HOME | End: 2018-04-14
Attending: FAMILY MEDICINE
Payer: COMMERCIAL

## 2018-04-14 VITALS
RESPIRATION RATE: 18 BRPM | OXYGEN SATURATION: 97 % | HEART RATE: 88 BPM | SYSTOLIC BLOOD PRESSURE: 133 MMHG | TEMPERATURE: 98 F | DIASTOLIC BLOOD PRESSURE: 70 MMHG

## 2018-04-14 DIAGNOSIS — S61.451A DOG BITE OF RIGHT HAND WITH INFECTION, INITIAL ENCOUNTER: Primary | ICD-10-CM

## 2018-04-14 DIAGNOSIS — L08.9 DOG BITE OF RIGHT HAND WITH INFECTION, INITIAL ENCOUNTER: Primary | ICD-10-CM
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
## 2018-04-14 PROCEDURE — 99214 OFFICE O/P EST MOD 30 MIN: CPT

## 2018-04-14 PROCEDURE — 99203 OFFICE O/P NEW LOW 30 MIN: CPT

## 2018-04-14 PROCEDURE — 96372 THER/PROPH/DIAG INJ SC/IM: CPT

## 2018-04-14 RX ORDER — AMOXICILLIN AND CLAVULANATE POTASSIUM 875; 125 MG/1; MG/1
1 TABLET, FILM COATED ORAL 2 TIMES DAILY
Qty: 20 TABLET | Refills: 0 | Status: SHIPPED | OUTPATIENT
Start: 2018-04-14 | End: 2018-04-24

## 2018-04-14 NOTE — ED INITIAL ASSESSMENT (HPI)
Pt was bit by a dog last Sunday in the right hand. Dog had all his shots. Now with c/o  Redness and swelling to hand. Denies fever.

## 2018-04-15 NOTE — ED PROVIDER NOTES
Patient Seen in: [facility]    History   Patient presents with:  Bite (integumentary)    Stated Complaint: Dog bite    HPI     46year old male presents for dog bite. Patient states last Sunday, dog bit him on the right hand.  St 97.6 °F (36.4 °C) (Oral)   Resp 18   SpO2 97%         Physical Exam   Constitutional: He is oriented to person, place, and time. He appears well-developed and well-nourished.    Cardiovascular: Normal rate, regular rhythm, normal heart sounds and intact dis

## 2018-04-20 ENCOUNTER — OFFICE VISIT (OUTPATIENT)
Dept: INTERNAL MEDICINE CLINIC | Facility: CLINIC | Age: 53
End: 2018-04-20

## 2018-04-20 VITALS
HEART RATE: 68 BPM | RESPIRATION RATE: 16 BRPM | TEMPERATURE: 98 F | OXYGEN SATURATION: 98 % | DIASTOLIC BLOOD PRESSURE: 64 MMHG | SYSTOLIC BLOOD PRESSURE: 134 MMHG | BODY MASS INDEX: 31.32 KG/M2 | WEIGHT: 244 LBS | HEIGHT: 74 IN

## 2018-04-20 DIAGNOSIS — E11.9 TYPE 2 DIABETES MELLITUS WITHOUT COMPLICATION, WITHOUT LONG-TERM CURRENT USE OF INSULIN (HCC): ICD-10-CM

## 2018-04-20 DIAGNOSIS — I10 ESSENTIAL HYPERTENSION WITH GOAL BLOOD PRESSURE LESS THAN 130/80: ICD-10-CM

## 2018-04-20 DIAGNOSIS — N52.9 ERECTILE DYSFUNCTION, UNSPECIFIED ERECTILE DYSFUNCTION TYPE: ICD-10-CM

## 2018-04-20 DIAGNOSIS — Z00.00 PHYSICAL EXAM, ANNUAL: Primary | ICD-10-CM

## 2018-04-20 DIAGNOSIS — E78.2 MIXED HYPERLIPIDEMIA: ICD-10-CM

## 2018-04-20 PROCEDURE — 99396 PREV VISIT EST AGE 40-64: CPT | Performed by: INTERNAL MEDICINE

## 2018-04-20 RX ORDER — SILDENAFIL 100 MG/1
100 TABLET, FILM COATED ORAL AS NEEDED
Qty: 6 TABLET | Refills: 0 | Status: SHIPPED | OUTPATIENT
Start: 2018-04-20 | End: 2018-09-21

## 2018-04-20 RX ORDER — BENAZEPRIL HYDROCHLORIDE 10 MG/1
TABLET ORAL
Qty: 30 TABLET | Refills: 5 | Status: SHIPPED | OUTPATIENT
Start: 2018-04-20 | End: 2018-10-12

## 2018-04-20 RX ORDER — ATORVASTATIN CALCIUM 40 MG/1
40 TABLET, FILM COATED ORAL DAILY
Qty: 90 TABLET | Refills: 1 | Status: SHIPPED | OUTPATIENT
Start: 2018-04-20 | End: 2018-10-12

## 2018-05-01 ENCOUNTER — HOSPITAL ENCOUNTER (OUTPATIENT)
Age: 53
Discharge: HOME | End: 2018-05-01
Attending: FAMILY MEDICINE
Payer: COMMERCIAL

## 2018-05-01 VITALS
TEMPERATURE: 98 F | HEART RATE: 94 BPM | RESPIRATION RATE: 18 BRPM | SYSTOLIC BLOOD PRESSURE: 131 MMHG | OXYGEN SATURATION: 97 % | DIASTOLIC BLOOD PRESSURE: 66 MMHG

## 2018-05-01 DIAGNOSIS — L23.9 ALLERGIC CONTACT DERMATITIS, UNSPECIFIED TRIGGER: Primary | ICD-10-CM

## 2018-05-01 PROCEDURE — 99214 OFFICE O/P EST MOD 30 MIN: CPT

## 2018-05-01 PROCEDURE — 99213 OFFICE O/P EST LOW 20 MIN: CPT

## 2018-05-01 RX ORDER — PREDNISONE 10 MG/1
TABLET ORAL
Qty: 20 TABLET | Refills: 0 | Status: SHIPPED | OUTPATIENT
Start: 2018-05-01 | End: 2018-07-13

## 2018-05-01 NOTE — ED INITIAL ASSESSMENT (HPI)
C/O rashes to both hands and in between fingers started Saturday and spreading. Some to forearm area.

## 2018-05-01 NOTE — ED PROVIDER NOTES
Patient Seen in: [facility]    History   Patient presents with:  Rash Skin Problem (integumentary)    Stated Complaint: rash x 2 days    HPI    This 43-year-old male presents to the office with complaint of rash which initially Comment: 4-6 Beers/week      Review of Systems    Positive for stated complaint: rash x 2 days  Other systems are as noted in HPI. Constitutional and vital signs reviewed. All other systems reviewed and negative except as noted above.     Physical Exa Impression:  Allergic contact dermatitis, unspecified trigger  (primary encounter diagnosis)    Disposition:  Discharge  5/1/2018  3:51 pm    Follow-up:  Nathaniel Yanez MD  [address] [place] [address] [id number] [phone]    Schedule an appoin

## 2018-07-13 ENCOUNTER — TELEPHONE (OUTPATIENT)
Dept: HEMATOLOGY/ONCOLOGY | Facility: HOSPITAL | Age: 53
End: 2018-07-13

## 2018-07-13 ENCOUNTER — OFFICE VISIT (OUTPATIENT)
Dept: HEMATOLOGY/ONCOLOGY | Facility: HOSPITAL | Age: 53
End: 2018-07-13
Attending: INTERNAL MEDICINE
Payer: COMMERCIAL

## 2018-07-13 VITALS
OXYGEN SATURATION: 98 % | DIASTOLIC BLOOD PRESSURE: 72 MMHG | RESPIRATION RATE: 18 BRPM | SYSTOLIC BLOOD PRESSURE: 115 MMHG | WEIGHT: 239.38 LBS | HEART RATE: 92 BPM | HEIGHT: 74.02 IN | TEMPERATURE: 98 F | BODY MASS INDEX: 30.72 KG/M2

## 2018-07-13 DIAGNOSIS — D72.820 LYMPHOCYTOSIS: ICD-10-CM

## 2018-07-13 DIAGNOSIS — R59.1 LYMPHADENOPATHY: ICD-10-CM

## 2018-07-13 DIAGNOSIS — C91.10 CLL (CHRONIC LYMPHOCYTIC LEUKEMIA) (HCC): ICD-10-CM

## 2018-07-13 DIAGNOSIS — C91.10 CLL (CHRONIC LYMPHOCYTIC LEUKEMIA) (HCC): Primary | ICD-10-CM

## 2018-07-13 DIAGNOSIS — R16.2 HEPATOSPLENOMEGALY: ICD-10-CM

## 2018-07-13 DIAGNOSIS — R59.9 ADENOPATHY: Primary | ICD-10-CM

## 2018-07-13 LAB
ALBUMIN SERPL-MCNC: 4.1 G/DL (ref 3.5–4.8)
ALP LIVER SERPL-CCNC: 84 U/L (ref 45–117)
ALT SERPL-CCNC: 34 U/L (ref 17–63)
AST SERPL-CCNC: 31 U/L (ref 15–41)
BASOPHILS # BLD AUTO: 0.16 X10(3) UL (ref 0–0.1)
BASOPHILS NFR BLD AUTO: 0.1 %
BILIRUB SERPL-MCNC: 0.4 MG/DL (ref 0.1–2)
BUN BLD-MCNC: 14 MG/DL (ref 8–20)
CALCIUM BLD-MCNC: 9.2 MG/DL (ref 8.3–10.3)
CHLORIDE: 106 MMOL/L (ref 101–111)
CO2: 24 MMOL/L (ref 22–32)
CREAT BLD-MCNC: 0.91 MG/DL (ref 0.7–1.3)
EOSINOPHIL # BLD AUTO: 0.15 X10(3) UL (ref 0–0.3)
EOSINOPHIL NFR BLD AUTO: 0.1 %
ERYTHROCYTE [DISTWIDTH] IN BLOOD BY AUTOMATED COUNT: 12.9 % (ref 11.5–16)
GLUCOSE BLD-MCNC: 95 MG/DL (ref 70–99)
HCT VFR BLD AUTO: 41.6 % (ref 37–53)
HGB BLD-MCNC: 12.8 G/DL (ref 13–17)
IMMATURE GRANULOCYTE COUNT: 0.24 X10(3) UL (ref 0–1)
IMMATURE GRANULOCYTE RATIO %: 0.1 %
LDH: 189 U/L (ref 84–249)
LYMPHOCYTES # BLD AUTO: 194.28 X10(3) UL (ref 0.9–4)
LYMPHOCYTES NFR BLD AUTO: 95.8 %
M PROTEIN MFR SERPL ELPH: 6.8 G/DL (ref 6.1–8.3)
MCH RBC QN AUTO: 28.8 PG (ref 27–33.2)
MCHC RBC AUTO-ENTMCNC: 30.8 G/DL (ref 31–37)
MCV RBC AUTO: 93.7 FL (ref 80–99)
MONOCYTES # BLD AUTO: 3.81 X10(3) UL (ref 0.1–1)
MONOCYTES NFR BLD AUTO: 1.9 %
NEUTROPHIL ABS PRELIM: 4.11 X10 (3) UL (ref 1.3–6.7)
NEUTROPHILS # BLD AUTO: 4.11 X10(3) UL (ref 1.3–6.7)
NEUTROPHILS NFR BLD AUTO: 2 %
PLATELET # BLD AUTO: 206 10(3)UL (ref 150–450)
POTASSIUM SERPL-SCNC: 6.2 MMOL/L (ref 3.6–5.1)
RBC # BLD AUTO: 4.44 X10(6)UL (ref 4.3–5.7)
RED CELL DISTRIBUTION WIDTH-SD: 42.3 FL (ref 35.1–46.3)
SODIUM SERPL-SCNC: 139 MMOL/L (ref 136–144)
URIC ACID: 6 MG/DL (ref 2.4–8.7)
WBC # BLD AUTO: 202.8 X10(3) UL (ref 4–13)

## 2018-07-13 PROCEDURE — 99215 OFFICE O/P EST HI 40 MIN: CPT | Performed by: INTERNAL MEDICINE

## 2018-07-13 NOTE — PROGRESS NOTES
Cancer Center Progress Note    Patient Name: Kwadwo Emanuel   YOB: 1965   Medical Record Number: [id number]   CSN: [id number]   Attending Physician: Gio Sheikh M.D.    Referring Physician: Perry Abreu MD      Date of Visit: 7/13/2018 0  •  atorvastatin 40 MG Oral Tab, Take 1 tablet (40 mg total) by mouth daily. , Disp: 90 tablet, Rfl: 1  •  Fluticasone (FLOVENT DISKUS) 250 MCG/BLIST Inhalation Aerosol Powder, Breath Activated, INHALE 1 PUFF BY MOUTH TWO TIMES A DAY, Disp: 60 each, Rfl: use: No    Sexual activity: Not on file     Other Topics Concern    Caffeine Concern Yes    Comment: 32-40 oz per day/coffee/tea    Exercise No     Social History Narrative   None on file         Allergies:    Codeine                 OTHER (SEE COMMENTS) Carbon Dioxide, Total Latest Ref Range: 22.0 - 32.0 mmol/L 24.0   BUN Latest Ref Range: 8 - 20 mg/dL 14   CREATININE Latest Ref Range: 0.70 - 1.30 mg/dL 0.91   CALCIUM Latest Ref Range: 8.3 - 10.3 mg/dL 9.2   GFR, Non-African American Latest Ref Range: > difficulties and social support systems and currently there are no active problems.     Delon Kaplan MD  [facility]

## 2018-07-17 ENCOUNTER — APPOINTMENT (OUTPATIENT)
Dept: LAB | Age: 53
End: 2018-07-17
Attending: INTERNAL MEDICINE
Payer: COMMERCIAL

## 2018-07-17 DIAGNOSIS — C91.10 CLL (CHRONIC LYMPHOCYTIC LEUKEMIA) (HCC): ICD-10-CM

## 2018-07-17 PROCEDURE — 84132 ASSAY OF SERUM POTASSIUM: CPT

## 2018-07-17 PROCEDURE — 36415 COLL VENOUS BLD VENIPUNCTURE: CPT

## 2018-07-18 LAB — POTASSIUM SERPL-SCNC: 4.8 MMOL/L (ref 3.6–5.1)

## 2018-08-09 ENCOUNTER — TELEPHONE (OUTPATIENT)
Dept: HEMATOLOGY/ONCOLOGY | Facility: HOSPITAL | Age: 53
End: 2018-08-09

## 2018-09-21 DIAGNOSIS — N52.9 ERECTILE DYSFUNCTION, UNSPECIFIED ERECTILE DYSFUNCTION TYPE: ICD-10-CM

## 2018-09-21 RX ORDER — SILDENAFIL 100 MG/1
TABLET, FILM COATED ORAL
Qty: 6 TABLET | Refills: 0 | Status: SHIPPED | OUTPATIENT
Start: 2018-09-21 | End: 2018-12-11

## 2018-09-24 DIAGNOSIS — J45.40 MODERATE PERSISTENT ASTHMA WITHOUT COMPLICATION: ICD-10-CM

## 2018-09-24 NOTE — TELEPHONE ENCOUNTER
Fax request from [facility] in [facility] for a refill on Fluticasone Propionate Inhal  qty 61 . Paper in triage.

## 2018-10-04 ENCOUNTER — TELEPHONE (OUTPATIENT)
Dept: INTERNAL MEDICINE CLINIC | Facility: CLINIC | Age: 53
End: 2018-10-04

## 2018-10-04 DIAGNOSIS — I10 ESSENTIAL HYPERTENSION: ICD-10-CM

## 2018-10-04 DIAGNOSIS — E11.9 TYPE 2 DIABETES MELLITUS WITHOUT COMPLICATION, WITHOUT LONG-TERM CURRENT USE OF INSULIN (HCC): ICD-10-CM

## 2018-10-04 DIAGNOSIS — E78.2 MIXED HYPERLIPIDEMIA: Primary | ICD-10-CM

## 2018-10-04 NOTE — TELEPHONE ENCOUNTER
Patient is wondering if he needs to come in and have labs done before his 6 month fu. If he does please place them with [facility] lab. Please call patient back and advise if he is to have labs may leave message if he doesn't pickup.

## 2018-10-04 NOTE — TELEPHONE ENCOUNTER
The pt was informed of the fasting labs: lipid, UA and HgbA1c.     CMP will be completed through Dr. Lito Barfield.

## 2018-10-12 ENCOUNTER — OFFICE VISIT (OUTPATIENT)
Dept: HEMATOLOGY/ONCOLOGY | Facility: HOSPITAL | Age: 53
End: 2018-10-12
Attending: INTERNAL MEDICINE
Payer: COMMERCIAL

## 2018-10-12 ENCOUNTER — APPOINTMENT (OUTPATIENT)
Dept: LAB | Age: 53
End: 2018-10-12
Attending: INTERNAL MEDICINE
Payer: COMMERCIAL

## 2018-10-12 VITALS
HEART RATE: 73 BPM | DIASTOLIC BLOOD PRESSURE: 63 MMHG | OXYGEN SATURATION: 98 % | RESPIRATION RATE: 16 BRPM | SYSTOLIC BLOOD PRESSURE: 117 MMHG | BODY MASS INDEX: 30.8 KG/M2 | WEIGHT: 240 LBS | HEIGHT: 74.02 IN | TEMPERATURE: 97 F

## 2018-10-12 DIAGNOSIS — C91.10 CLL (CHRONIC LYMPHOCYTIC LEUKEMIA) (HCC): Primary | ICD-10-CM

## 2018-10-12 DIAGNOSIS — R59.1 LYMPHADENOPATHY: ICD-10-CM

## 2018-10-12 DIAGNOSIS — D72.820 LYMPHOCYTOSIS: ICD-10-CM

## 2018-10-12 DIAGNOSIS — E78.2 MIXED HYPERLIPIDEMIA: ICD-10-CM

## 2018-10-12 DIAGNOSIS — E11.9 TYPE 2 DIABETES MELLITUS WITHOUT COMPLICATION, WITHOUT LONG-TERM CURRENT USE OF INSULIN (HCC): ICD-10-CM

## 2018-10-12 DIAGNOSIS — R61 NIGHT SWEATS: ICD-10-CM

## 2018-10-12 DIAGNOSIS — I10 ESSENTIAL HYPERTENSION: ICD-10-CM

## 2018-10-12 DIAGNOSIS — D63.0 ANEMIA IN NEOPLASTIC DISEASE: ICD-10-CM

## 2018-10-12 DIAGNOSIS — I10 ESSENTIAL HYPERTENSION WITH GOAL BLOOD PRESSURE LESS THAN 130/80: ICD-10-CM

## 2018-10-12 PROCEDURE — 83036 HEMOGLOBIN GLYCOSYLATED A1C: CPT | Performed by: INTERNAL MEDICINE

## 2018-10-12 PROCEDURE — 81001 URINALYSIS AUTO W/SCOPE: CPT | Performed by: INTERNAL MEDICINE

## 2018-10-12 PROCEDURE — 36415 COLL VENOUS BLD VENIPUNCTURE: CPT | Performed by: INTERNAL MEDICINE

## 2018-10-12 PROCEDURE — 38222 DX BONE MARROW BX & ASPIR: CPT | Performed by: INTERNAL MEDICINE

## 2018-10-12 PROCEDURE — 99215 OFFICE O/P EST HI 40 MIN: CPT | Performed by: INTERNAL MEDICINE

## 2018-10-12 PROCEDURE — 80061 LIPID PANEL: CPT | Performed by: INTERNAL MEDICINE

## 2018-10-12 NOTE — PROGRESS NOTES
Cancer Center Progress Note    Patient Name: Gricel Faust   YOB: 1965   Medical Record Number: [id number]   CSN: [id number]   Attending Physician: Delon Kaplan M.D.    Referring Physician: Salome Saha MD      Date of Visit: 10/12/2018 OR, Take by mouth., Disp: , Rfl:   •  aspirin 81 MG Oral Tab, Take 1 tablet by mouth daily. , Disp: , Rfl: 0  •  Fexofenadine HCl (WAL-FEX ALLERGY) 180 MG Oral Tab, Take 1 tablet by mouth once daily. , Disp: 90 tablet, Rfl: 1  •  Multiple Vitamins-Minerals ( Pack years: 40        Types: Cigarettes        Quit date: 2001        Years since quittin.4      Smokeless tobacco: Never Used      Tobacco comment: currently 1 cigar daily - does not inhale    Substance and Sexual Activity      Alcohol use:  Yes No palpable mass. Extremities: Pedal pulses are present. No edema. Neurological: Grossly intact.         Laboratory:  Recent Results (from the past 24 hour(s))   LIPID PANEL    Collection Time: 10/12/18  7:35 AM   Result Value Ref Range    Cholesterol, To -American 117 >=60    AST 27 15 - 41 U/L    Alt 23 17 - 63 U/L    Alkaline Phosphatase 89 45 - 117 U/L    Bilirubin, Total 0.5 0.1 - 2.0 mg/dL    Total Protein 6.6 6.4 - 8.2 g/dL    Albumin 4.0 3.1 - 4.5 g/dL    Globulin  2.6 (L) 2.8 - 4.4 g/dL    A proceed with this today. Additional labs and imaging as ordered. Labs reviewed. Pseudohyperkalemia from elevated white count- no intervention needed.      Emotional Well Being:  I have assessed the patient's emotional well-being and any concerns about a

## 2018-10-15 RX ORDER — ATORVASTATIN CALCIUM 40 MG/1
TABLET, FILM COATED ORAL
Qty: 30 TABLET | Refills: 5 | Status: SHIPPED | OUTPATIENT
Start: 2018-10-15 | End: 2019-04-09

## 2018-10-15 RX ORDER — BENAZEPRIL HYDROCHLORIDE 10 MG/1
TABLET ORAL
Qty: 30 TABLET | Refills: 5 | Status: SHIPPED | OUTPATIENT
Start: 2018-10-15 | End: 2019-04-09

## 2018-10-18 ENCOUNTER — OFFICE VISIT (OUTPATIENT)
Dept: INTERNAL MEDICINE CLINIC | Facility: CLINIC | Age: 53
End: 2018-10-18
Payer: COMMERCIAL

## 2018-10-18 VITALS
SYSTOLIC BLOOD PRESSURE: 122 MMHG | HEART RATE: 87 BPM | RESPIRATION RATE: 16 BRPM | BODY MASS INDEX: 30.54 KG/M2 | WEIGHT: 238 LBS | TEMPERATURE: 98 F | DIASTOLIC BLOOD PRESSURE: 64 MMHG | HEIGHT: 74 IN

## 2018-10-18 DIAGNOSIS — E78.2 MIXED HYPERLIPIDEMIA: ICD-10-CM

## 2018-10-18 DIAGNOSIS — I10 ESSENTIAL HYPERTENSION: ICD-10-CM

## 2018-10-18 DIAGNOSIS — J45.40 MODERATE PERSISTENT ASTHMA WITHOUT COMPLICATION: ICD-10-CM

## 2018-10-18 DIAGNOSIS — Z23 NEED FOR INFLUENZA VACCINATION: ICD-10-CM

## 2018-10-18 DIAGNOSIS — L65.9 ALOPECIA: ICD-10-CM

## 2018-10-18 DIAGNOSIS — E11.9 TYPE 2 DIABETES MELLITUS WITHOUT COMPLICATION, WITHOUT LONG-TERM CURRENT USE OF INSULIN (HCC): Primary | ICD-10-CM

## 2018-10-18 PROCEDURE — 90471 IMMUNIZATION ADMIN: CPT | Performed by: INTERNAL MEDICINE

## 2018-10-18 PROCEDURE — 90686 IIV4 VACC NO PRSV 0.5 ML IM: CPT | Performed by: INTERNAL MEDICINE

## 2018-10-18 PROCEDURE — 99214 OFFICE O/P EST MOD 30 MIN: CPT | Performed by: INTERNAL MEDICINE

## 2018-10-18 RX ORDER — FINASTERIDE 5 MG/1
5 TABLET, FILM COATED ORAL DAILY
Qty: 90 TABLET | Refills: 1 | Status: SHIPPED | OUTPATIENT
Start: 2018-10-18 | End: 2019-06-07

## 2018-10-18 RX ORDER — GLIMEPIRIDE 2 MG/1
2 TABLET ORAL
Qty: 90 TABLET | Refills: 0 | Status: SHIPPED | OUTPATIENT
Start: 2018-10-18 | End: 2018-12-15

## 2018-10-19 ENCOUNTER — TELEPHONE (OUTPATIENT)
Dept: HEMATOLOGY/ONCOLOGY | Facility: HOSPITAL | Age: 53
End: 2018-10-19

## 2018-10-19 ENCOUNTER — HOSPITAL ENCOUNTER (OUTPATIENT)
Dept: NUCLEAR MEDICINE | Facility: HOSPITAL | Age: 53
Discharge: HOME | End: 2018-10-19
Attending: INTERNAL MEDICINE
Payer: COMMERCIAL

## 2018-10-19 DIAGNOSIS — C91.10 CLL (CHRONIC LYMPHOCYTIC LEUKEMIA) (HCC): ICD-10-CM

## 2018-10-19 PROCEDURE — 78815 PET IMAGE W/CT SKULL-THIGH: CPT | Performed by: INTERNAL MEDICINE

## 2018-10-19 PROCEDURE — 82962 GLUCOSE BLOOD TEST: CPT

## 2018-10-19 NOTE — TELEPHONE ENCOUNTER
Called to go over bone marrow biopsy results. Went to [facility] and left message. Biopsy was released in [facility].

## 2018-10-19 NOTE — PROGRESS NOTES
HPI:    Patient ID: Stoney Bautista is a 48year old male. Hyperlipidemia     Hypertension     Diabetes     Asthma   His past medical history is significant for asthma.      HPI:   Stoney Bautista is a 48year old male who presents for recheck of his diabetes, finasteride 5 MG Oral Tab Take 1 tablet (5 mg total) by mouth daily.  Disp: 90 tablet Rfl: 1   BENAZEPRIL HCL 10 MG Oral Tab TAKE 1 TABLET BY MOUTH ONE TIME A DAY  Disp: 30 tablet Rfl: 5   ATORVASTATIN 40 MG Oral Tab TAKE 1 TABLET BY MOUTH ONE TIME A DAY 2001        Years since quittin.4      Smokeless tobacco: Never Used      Tobacco comment: currently 1 cigar daily - does not inhale    Alcohol use: Yes      Comment: 4-6 Beers/week    Drug use: No    Exercise: minimal.  Diet: watches fats closely Oral Tab Take 1 tablet (2 mg total) by mouth every morning before breakfast. Disp: 90 tablet Rfl: 0   finasteride 5 MG Oral Tab Take 1 tablet (5 mg total) by mouth daily.  Disp: 90 tablet Rfl: 1   BENAZEPRIL HCL 10 MG Oral Tab TAKE 1 TABLET BY MOUTH ONE ANNY every morning before breakfast.   • finasteride 5 MG Oral Tab 90 tablet 1     Sig: Take 1 tablet (5 mg total) by mouth daily.        Imaging & Referrals:  INFLUENZA VIRUS VACCINE, QUAD, PRESERVATIVE FREE, 0.5 ML       MG#[id number]

## 2018-10-25 ENCOUNTER — TELEPHONE (OUTPATIENT)
Dept: HEMATOLOGY/ONCOLOGY | Facility: HOSPITAL | Age: 53
End: 2018-10-25

## 2018-10-25 NOTE — TELEPHONE ENCOUNTER
Called him with complete bone marrow biopsy results.  Released through [address] as well and will see me tomorrow

## 2018-10-26 ENCOUNTER — HOSPITAL ENCOUNTER (OUTPATIENT)
Dept: CV DIAGNOSTICS | Facility: HOSPITAL | Age: 53
Discharge: HOME | End: 2018-10-26
Attending: INTERNAL MEDICINE
Payer: COMMERCIAL

## 2018-10-26 ENCOUNTER — OFFICE VISIT (OUTPATIENT)
Dept: HEMATOLOGY/ONCOLOGY | Facility: HOSPITAL | Age: 53
End: 2018-10-26
Attending: INTERNAL MEDICINE
Payer: COMMERCIAL

## 2018-10-26 VITALS
BODY MASS INDEX: 30.67 KG/M2 | WEIGHT: 239 LBS | HEART RATE: 96 BPM | SYSTOLIC BLOOD PRESSURE: 126 MMHG | DIASTOLIC BLOOD PRESSURE: 71 MMHG | TEMPERATURE: 98 F | OXYGEN SATURATION: 98 % | HEIGHT: 74.02 IN | RESPIRATION RATE: 16 BRPM

## 2018-10-26 DIAGNOSIS — R59.1 LYMPHADENOPATHY: ICD-10-CM

## 2018-10-26 DIAGNOSIS — D72.820 LYMPHOCYTOSIS: ICD-10-CM

## 2018-10-26 DIAGNOSIS — C91.10 CLL (CHRONIC LYMPHOCYTIC LEUKEMIA) (HCC): Primary | ICD-10-CM

## 2018-10-26 DIAGNOSIS — R16.2 HEPATOSPLENOMEGALY: ICD-10-CM

## 2018-10-26 DIAGNOSIS — D63.0 ANEMIA IN NEOPLASTIC DISEASE: ICD-10-CM

## 2018-10-26 DIAGNOSIS — R61 NIGHT SWEATS: ICD-10-CM

## 2018-10-26 PROCEDURE — 99215 OFFICE O/P EST HI 40 MIN: CPT | Performed by: INTERNAL MEDICINE

## 2018-11-02 ENCOUNTER — OFFICE VISIT (OUTPATIENT)
Dept: HEMATOLOGY/ONCOLOGY | Facility: HOSPITAL | Age: 53
End: 2018-11-02
Attending: INTERNAL MEDICINE
Payer: COMMERCIAL

## 2018-11-02 ENCOUNTER — TELEPHONE (OUTPATIENT)
Dept: HEMATOLOGY/ONCOLOGY | Facility: HOSPITAL | Age: 53
End: 2018-11-02

## 2018-11-02 VITALS
OXYGEN SATURATION: 98 % | RESPIRATION RATE: 16 BRPM | BODY MASS INDEX: 30.62 KG/M2 | HEART RATE: 77 BPM | TEMPERATURE: 98 F | HEIGHT: 74.02 IN | SYSTOLIC BLOOD PRESSURE: 113 MMHG | DIASTOLIC BLOOD PRESSURE: 69 MMHG | WEIGHT: 238.63 LBS

## 2018-11-02 DIAGNOSIS — E87.5 HYPERPOTASSEMIA: ICD-10-CM

## 2018-11-02 DIAGNOSIS — C91.10 CLL (CHRONIC LYMPHOCYTIC LEUKEMIA) (HCC): Primary | ICD-10-CM

## 2018-11-02 DIAGNOSIS — R59.1 LYMPHADENOPATHY: ICD-10-CM

## 2018-11-02 DIAGNOSIS — R61 NIGHT SWEATS: ICD-10-CM

## 2018-11-02 PROCEDURE — 99215 OFFICE O/P EST HI 40 MIN: CPT | Performed by: NURSE PRACTITIONER

## 2018-11-02 RX ORDER — PROCHLORPERAZINE MALEATE 10 MG
10 TABLET ORAL EVERY 6 HOURS PRN
Qty: 30 TABLET | Refills: 3 | Status: SHIPPED | OUTPATIENT
Start: 2018-11-02 | End: 2020-03-16

## 2018-11-02 NOTE — TELEPHONE ENCOUNTER
Called pt regarding stat critical lab result showing potassium level of 7.0. Per Paulina 44 APN, pt needs to return to cancer center for evaluation. Pt verbalized understanding and is returning.

## 2018-11-02 NOTE — PROGRESS NOTES
Nutrition Consultation    Patient Name: Angie Loyd  YOB: 1965  Medical Record Number: [id number]   Account Number: [de-identified]  Dietitian: Nataly Batista RD    Date of visit: 11/2/2018    Diet Rx: high protein/calorie as tolerated    Pert DYSFUNCTION, Disp: 6 tablet, Rfl: 0  •  CINNAMON OR, Take by mouth., Disp: , Rfl:   •  aspirin 81 MG Oral Tab, Take 1 tablet by mouth daily. , Disp: , Rfl: 0  •  Fexofenadine HCl (WAL-FEX ALLERGY) 180 MG Oral Tab, Take 1 tablet by mouth once daily. , Disp: 9

## 2018-11-05 ENCOUNTER — APPOINTMENT (OUTPATIENT)
Dept: LAB | Age: 53
End: 2018-11-05
Attending: NURSE PRACTITIONER
Payer: COMMERCIAL

## 2018-11-05 DIAGNOSIS — E87.5 HYPERPOTASSEMIA: ICD-10-CM

## 2018-11-05 DIAGNOSIS — C91.10 CLL (CHRONIC LYMPHOCYTIC LEUKEMIA) (HCC): ICD-10-CM

## 2018-11-05 PROCEDURE — 84132 ASSAY OF SERUM POTASSIUM: CPT

## 2018-11-05 PROCEDURE — 36415 COLL VENOUS BLD VENIPUNCTURE: CPT

## 2018-11-16 ENCOUNTER — OFFICE VISIT (OUTPATIENT)
Dept: HEMATOLOGY/ONCOLOGY | Facility: HOSPITAL | Age: 53
End: 2018-11-16
Attending: INTERNAL MEDICINE
Payer: COMMERCIAL

## 2018-11-16 VITALS
HEART RATE: 85 BPM | HEIGHT: 74.02 IN | WEIGHT: 234.38 LBS | TEMPERATURE: 99 F | RESPIRATION RATE: 18 BRPM | BODY MASS INDEX: 30.08 KG/M2 | DIASTOLIC BLOOD PRESSURE: 66 MMHG | SYSTOLIC BLOOD PRESSURE: 116 MMHG | OXYGEN SATURATION: 98 %

## 2018-11-16 DIAGNOSIS — C91.10 CLL (CHRONIC LYMPHOCYTIC LEUKEMIA) (HCC): ICD-10-CM

## 2018-11-16 DIAGNOSIS — M10.9 ACUTE GOUT OF MULTIPLE SITES, UNSPECIFIED CAUSE: Primary | ICD-10-CM

## 2018-11-16 PROCEDURE — 99214 OFFICE O/P EST MOD 30 MIN: CPT | Performed by: CLINICAL NURSE SPECIALIST

## 2018-11-16 RX ORDER — METHYLPREDNISOLONE 4 MG/1
TABLET ORAL
Qty: 1 PACKAGE | Refills: 0 | Status: SHIPPED | OUTPATIENT
Start: 2018-11-16 | End: 2018-12-10

## 2018-11-16 RX ORDER — ALLOPURINOL 100 MG/1
100 TABLET ORAL DAILY
Qty: 30 TABLET | Refills: 0 | Status: SHIPPED | OUTPATIENT
Start: 2018-11-16 | End: 2018-12-11

## 2018-11-16 NOTE — PROGRESS NOTES
Patient denies any nausea or diarrhea. Patient is complaining of inflammation in his left big toe, Right middle finger and wrist, also his left elbow.

## 2018-11-23 ENCOUNTER — NURSE ONLY (OUTPATIENT)
Dept: HEMATOLOGY/ONCOLOGY | Facility: HOSPITAL | Age: 53
End: 2018-11-23
Attending: INTERNAL MEDICINE
Payer: COMMERCIAL

## 2018-11-23 DIAGNOSIS — C91.10 CLL (CHRONIC LYMPHOCYTIC LEUKEMIA) (HCC): Primary | ICD-10-CM

## 2018-11-26 ENCOUNTER — APPOINTMENT (OUTPATIENT)
Dept: HEMATOLOGY/ONCOLOGY | Facility: HOSPITAL | Age: 53
End: 2018-11-26
Attending: INTERNAL MEDICINE
Payer: COMMERCIAL

## 2018-11-30 ENCOUNTER — NURSE ONLY (OUTPATIENT)
Dept: HEMATOLOGY/ONCOLOGY | Facility: HOSPITAL | Age: 53
End: 2018-11-30

## 2018-11-30 ENCOUNTER — LAB ENCOUNTER (OUTPATIENT)
Dept: LAB | Age: 53
End: 2018-11-30
Attending: INTERNAL MEDICINE
Payer: COMMERCIAL

## 2018-11-30 DIAGNOSIS — C91.10 CLL (CHRONIC LYMPHOCYTIC LEUKEMIA) (HCC): ICD-10-CM

## 2018-11-30 PROCEDURE — 80053 COMPREHEN METABOLIC PANEL: CPT

## 2018-11-30 PROCEDURE — 85025 COMPLETE CBC W/AUTO DIFF WBC: CPT

## 2018-11-30 PROCEDURE — 36415 COLL VENOUS BLD VENIPUNCTURE: CPT

## 2018-12-07 ENCOUNTER — LAB ENCOUNTER (OUTPATIENT)
Dept: LAB | Age: 53
End: 2018-12-07
Attending: INTERNAL MEDICINE
Payer: COMMERCIAL

## 2018-12-07 ENCOUNTER — TELEPHONE (OUTPATIENT)
Dept: HEMATOLOGY/ONCOLOGY | Facility: HOSPITAL | Age: 53
End: 2018-12-07

## 2018-12-07 DIAGNOSIS — C91.10 CLL (CHRONIC LYMPHOCYTIC LEUKEMIA) (HCC): ICD-10-CM

## 2018-12-07 PROCEDURE — 80053 COMPREHEN METABOLIC PANEL: CPT

## 2018-12-07 PROCEDURE — 85025 COMPLETE CBC W/AUTO DIFF WBC: CPT

## 2018-12-07 PROCEDURE — 36415 COLL VENOUS BLD VENIPUNCTURE: CPT

## 2018-12-10 ENCOUNTER — TELEPHONE (OUTPATIENT)
Dept: INTERNAL MEDICINE CLINIC | Facility: CLINIC | Age: 53
End: 2018-12-10

## 2018-12-10 DIAGNOSIS — M10.9 GOUT, UNSPECIFIED CAUSE, UNSPECIFIED CHRONICITY, UNSPECIFIED SITE: Primary | ICD-10-CM

## 2018-12-10 NOTE — TELEPHONE ENCOUNTER
Pt had uric acid level 11/16/18 and would like it repeated since it has been 4 weeks. Pt will be having blood drawn on Friday. Ok per Dr. Zuleima Hernanedz to place order. Order placed.

## 2018-12-10 NOTE — TELEPHONE ENCOUNTER
Patient called and stated he is completing labs for the oncologist, and has gout flare ups, and is scheduled with Sabiha Bonilla on 12/21/2018, and is wanting to know if any blood work will be needed? Please advise patient.

## 2018-12-11 DIAGNOSIS — N52.9 ERECTILE DYSFUNCTION, UNSPECIFIED ERECTILE DYSFUNCTION TYPE: ICD-10-CM

## 2018-12-11 RX ORDER — SILDENAFIL 100 MG/1
TABLET, FILM COATED ORAL
Qty: 6 TABLET | Refills: 5 | Status: SHIPPED | OUTPATIENT
Start: 2018-12-11 | End: 2020-01-17

## 2018-12-11 RX ORDER — ALLOPURINOL 100 MG/1
TABLET ORAL
Qty: 30 TABLET | Refills: 0 | Status: SHIPPED | OUTPATIENT
Start: 2018-12-11 | End: 2018-12-14

## 2018-12-14 ENCOUNTER — OFFICE VISIT (OUTPATIENT)
Dept: HEMATOLOGY/ONCOLOGY | Facility: HOSPITAL | Age: 53
End: 2018-12-14
Attending: INTERNAL MEDICINE
Payer: COMMERCIAL

## 2018-12-14 VITALS
BODY MASS INDEX: 31.44 KG/M2 | HEIGHT: 74.02 IN | TEMPERATURE: 98 F | DIASTOLIC BLOOD PRESSURE: 73 MMHG | SYSTOLIC BLOOD PRESSURE: 121 MMHG | OXYGEN SATURATION: 96 % | RESPIRATION RATE: 16 BRPM | WEIGHT: 245 LBS | HEART RATE: 83 BPM

## 2018-12-14 DIAGNOSIS — R59.1 LYMPHADENOPATHY: ICD-10-CM

## 2018-12-14 DIAGNOSIS — D72.820 LYMPHOCYTOSIS: ICD-10-CM

## 2018-12-14 DIAGNOSIS — M10.9 GOUT, UNSPECIFIED CAUSE, UNSPECIFIED CHRONICITY, UNSPECIFIED SITE: ICD-10-CM

## 2018-12-14 DIAGNOSIS — D63.0 ANEMIA IN NEOPLASTIC DISEASE: ICD-10-CM

## 2018-12-14 DIAGNOSIS — E88.3 TUMOR LYSIS SYNDROME: ICD-10-CM

## 2018-12-14 DIAGNOSIS — M10.9 ACUTE GOUT OF MULTIPLE SITES, UNSPECIFIED CAUSE: Primary | ICD-10-CM

## 2018-12-14 DIAGNOSIS — C91.10 CLL (CHRONIC LYMPHOCYTIC LEUKEMIA) (HCC): ICD-10-CM

## 2018-12-14 PROCEDURE — 99215 OFFICE O/P EST HI 40 MIN: CPT | Performed by: INTERNAL MEDICINE

## 2018-12-14 RX ORDER — ALLOPURINOL 300 MG/1
300 TABLET ORAL DAILY
Qty: 30 TABLET | Refills: 0 | Status: SHIPPED | OUTPATIENT
Start: 2018-12-14 | End: 2019-01-17

## 2018-12-14 NOTE — PROGRESS NOTES
Oral Chemotherapy Monitoring and Adherence  1. When and how do you take your medication (time of day, with/without food-as directed)? Around 6 PM, with a glass of water    2. Have you missed any of your doses?  If yes, what did you do when you realized you

## 2018-12-15 DIAGNOSIS — E11.9 TYPE 2 DIABETES MELLITUS WITHOUT COMPLICATION, WITHOUT LONG-TERM CURRENT USE OF INSULIN (HCC): ICD-10-CM

## 2018-12-17 RX ORDER — GLIMEPIRIDE 2 MG/1
TABLET ORAL
Qty: 30 TABLET | Refills: 5 | Status: SHIPPED | OUTPATIENT
Start: 2018-12-17 | End: 2019-05-26

## 2018-12-17 RX ORDER — ALLOPURINOL 100 MG/1
TABLET ORAL
Qty: 30 TABLET | Refills: 0 | Status: SHIPPED | OUTPATIENT
Start: 2018-12-17 | End: 2019-01-17 | Stop reason: ALTCHOICE

## 2018-12-23 NOTE — PROGRESS NOTES
Cancer Center Progress Note    Patient Name: Pablo Ly   YOB: 1965   Medical Record Number: [id number]   CSN: [id number]   Attending Physician: Santiago Young M.D.    Referring Physician: Sabrina Theodore MD      Date of Visit: 12/14/2018 finasteride 5 MG Oral Tab, Take 1 tablet (5 mg total) by mouth daily. , Disp: 90 tablet, Rfl: 1  •  BENAZEPRIL HCL 10 MG Oral Tab, TAKE 1 TABLET BY MOUTH ONE TIME A DAY , Disp: 30 tablet, Rfl: 5  •  ATORVASTATIN 40 MG Oral Tab, TAKE 1 TABLET BY MOUTH ONE TI Medical History:  Family History   Problem Relation Age of Onset   • Other (CHF) Maternal Grandmother    • Other (Acute Myocardial Infarction) Paternal Grandfather    • Asthma Mother    • Diabetes Paternal Grandmother        Psychosocial History:  Social H SHORTNESS OF BREATH     Review of Systems:  A 14-point ROS was done with pertinent positives and negative per the HPI    Vital Signs:  /73 (BP Location: Left arm, Patient Position: Sitting, Cuff Size: adult)   Pulse 83   Temp 97.5 °F (36.4 °C) (Tympa GFRAA 100 12/14/2018    CA 7.5 (L) 12/14/2018    OSMOCALC 294 12/14/2018    ALKPHO 57 12/14/2018    AST 22 12/14/2018    ALT 24 12/14/2018    BILT 0.5 12/14/2018    TP 6.2 (L) 12/14/2018    ALB 3.4 12/14/2018    GLOBULT 1.9 06/29/2013    GLOBULIN 2.8 12/14 3.7.  Increased size of pelvic adenopathy with conglomerate on the   right measuring 7.4 x 2.5 versus 5.0 x 1.2 cm within the iliac chain, maximum SUV = 3.0. Left pelvic sidewall adenopathy measures 4.4 x 2.4 vs 2.8 x 1.6 cm with a maximum SUV of 2.1.   Bi problems.       MD Olayinka Espinoza [facility]

## 2019-01-03 ENCOUNTER — TELEPHONE (OUTPATIENT)
Dept: INTERNAL MEDICINE CLINIC | Facility: CLINIC | Age: 54
End: 2019-01-03

## 2019-01-03 NOTE — TELEPHONE ENCOUNTER
Patient is requesting to see Dr. Zamudio Patient asap due to an urgent care visit. I did not know if you can squeeze in or not. He was pretty stallings and he would not tell me what it is about.

## 2019-01-04 NOTE — TELEPHONE ENCOUNTER
Spoke with pt he was seen in an urgent care facility due to decrease appetite and diarrhea. He was given IV hydration and discharged. Pt is feeling better today. His diarrhea has resolved and he is pushing fluids.

## 2019-01-07 DIAGNOSIS — J45.30 MILD PERSISTENT ASTHMA WITHOUT COMPLICATION: ICD-10-CM

## 2019-01-07 RX ORDER — ALBUTEROL SULFATE 90 UG/1
AEROSOL, METERED RESPIRATORY (INHALATION)
Qty: 8.5 G | Refills: 2 | Status: SHIPPED | OUTPATIENT
Start: 2019-01-07

## 2019-01-17 ENCOUNTER — OFFICE VISIT (OUTPATIENT)
Dept: HEMATOLOGY/ONCOLOGY | Facility: HOSPITAL | Age: 54
End: 2019-01-17
Attending: INTERNAL MEDICINE
Payer: COMMERCIAL

## 2019-01-17 ENCOUNTER — TELEPHONE (OUTPATIENT)
Dept: HEMATOLOGY/ONCOLOGY | Facility: HOSPITAL | Age: 54
End: 2019-01-17

## 2019-01-17 VITALS
HEART RATE: 89 BPM | WEIGHT: 242 LBS | RESPIRATION RATE: 18 BRPM | BODY MASS INDEX: 31.06 KG/M2 | TEMPERATURE: 97 F | SYSTOLIC BLOOD PRESSURE: 119 MMHG | DIASTOLIC BLOOD PRESSURE: 69 MMHG | HEIGHT: 74.02 IN | OXYGEN SATURATION: 98 %

## 2019-01-17 DIAGNOSIS — E11.9 TYPE 2 DIABETES MELLITUS WITHOUT COMPLICATION, WITHOUT LONG-TERM CURRENT USE OF INSULIN (HCC): ICD-10-CM

## 2019-01-17 DIAGNOSIS — R16.2 HEPATOSPLENOMEGALY: ICD-10-CM

## 2019-01-17 DIAGNOSIS — C91.10 CLL (CHRONIC LYMPHOCYTIC LEUKEMIA) (HCC): ICD-10-CM

## 2019-01-17 DIAGNOSIS — M10.9 GOUT, UNSPECIFIED CAUSE, UNSPECIFIED CHRONICITY, UNSPECIFIED SITE: ICD-10-CM

## 2019-01-17 DIAGNOSIS — E88.3 TUMOR LYSIS SYNDROME: ICD-10-CM

## 2019-01-17 DIAGNOSIS — D63.0 ANEMIA IN NEOPLASTIC DISEASE: Primary | ICD-10-CM

## 2019-01-17 LAB
ALBUMIN SERPL-MCNC: 3.8 G/DL (ref 3.1–4.5)
ALBUMIN/GLOB SERPL: 1.4 {RATIO} (ref 1–2)
ALP LIVER SERPL-CCNC: 60 U/L (ref 45–117)
ALT SERPL-CCNC: 21 U/L (ref 17–63)
ANION GAP SERPL CALC-SCNC: 5 MMOL/L (ref 0–18)
AST SERPL-CCNC: 16 U/L (ref 15–41)
BASOPHILS # BLD AUTO: 0.04 X10(3) UL (ref 0–0.1)
BASOPHILS NFR BLD AUTO: 0 %
BILIRUB SERPL-MCNC: 0.3 MG/DL (ref 0.1–2)
BUN BLD-MCNC: 11 MG/DL (ref 8–20)
BUN/CREAT SERPL: 13.6 (ref 10–20)
CALCIUM BLD-MCNC: 8.8 MG/DL (ref 8.3–10.3)
CHLORIDE SERPL-SCNC: 107 MMOL/L (ref 101–111)
CO2 SERPL-SCNC: 28 MMOL/L (ref 22–32)
CREAT BLD-MCNC: 0.81 MG/DL (ref 0.7–1.3)
EOSINOPHIL # BLD AUTO: 0.08 X10(3) UL (ref 0–0.3)
EOSINOPHIL NFR BLD AUTO: 0.1 %
ERYTHROCYTE [DISTWIDTH] IN BLOOD BY AUTOMATED COUNT: 12.4 % (ref 11.5–16)
EST. AVERAGE GLUCOSE BLD GHB EST-MCNC: 148 MG/DL (ref 68–126)
GLOBULIN PLAS-MCNC: 2.8 G/DL (ref 2.8–4.4)
GLUCOSE BLD-MCNC: 96 MG/DL (ref 70–99)
HBA1C MFR BLD HPLC: 6.8 % (ref ?–5.7)
HCT VFR BLD AUTO: 40.4 % (ref 37–53)
HGB BLD-MCNC: 12.6 G/DL (ref 13–17)
IMMATURE GRANULOCYTE COUNT: 0.22 X10(3) UL (ref 0–1)
IMMATURE GRANULOCYTE RATIO %: 0.2 %
LDH: 135 U/L (ref 84–249)
LYMPHOCYTES # BLD AUTO: 128.18 X10(3) UL (ref 0.9–4)
LYMPHOCYTES NFR BLD AUTO: 95.2 %
M PROTEIN MFR SERPL ELPH: 6.6 G/DL (ref 6.4–8.2)
MCH RBC QN AUTO: 29.1 PG (ref 27–33.2)
MCHC RBC AUTO-ENTMCNC: 31.2 G/DL (ref 31–37)
MCV RBC AUTO: 93.3 FL (ref 80–99)
MONOCYTES # BLD AUTO: 0.42 X10(3) UL (ref 0.1–1)
MONOCYTES NFR BLD AUTO: 0.3 %
NEUTROPHIL ABS PRELIM: 5.64 X10 (3) UL (ref 1.3–6.7)
NEUTROPHILS # BLD AUTO: 5.64 X10(3) UL (ref 1.3–6.7)
NEUTROPHILS NFR BLD AUTO: 4.2 %
OSMOLALITY SERPL CALC.SUM OF ELEC: 289 MOSM/KG (ref 275–295)
PHOSPHATE SERPL-MCNC: 3.4 MG/DL (ref 2.5–4.9)
PLATELET # BLD AUTO: 230 10(3)UL (ref 150–450)
POTASSIUM SERPL-SCNC: 4.4 MMOL/L (ref 3.6–5.1)
RBC # BLD AUTO: 4.33 X10(6)UL (ref 4.3–5.7)
RED CELL DISTRIBUTION WIDTH-SD: 41.3 FL (ref 35.1–46.3)
SODIUM SERPL-SCNC: 140 MMOL/L (ref 136–144)
URATE SERPL-MCNC: 3.9 MG/DL (ref 2.4–8.7)
WBC # BLD AUTO: 134.6 X10(3) UL (ref 4–13)

## 2019-01-17 PROCEDURE — 99214 OFFICE O/P EST MOD 30 MIN: CPT | Performed by: INTERNAL MEDICINE

## 2019-01-17 RX ORDER — ALLOPURINOL 300 MG/1
TABLET ORAL
Qty: 30 TABLET | Refills: 0 | Status: SHIPPED | OUTPATIENT
Start: 2019-01-17 | End: 2019-01-28

## 2019-01-17 NOTE — PROGRESS NOTES
Cancer Center Progress Note    Patient Name: Patricia Guevara   YOB: 1965   Medical Record Number: [id number]   CSN: [id number]   Attending Physician: Cullen Escobar M.D.    Referring Physician: Pepper Schafer MD      Date of Visit: 1/17/2019 mouth daily. , Disp: , Rfl:   •  Fluticasone (FLOVENT DISKUS) 250 MCG/BLIST Inhalation Aerosol Powder, Breath Activated, INHALE 1 PUFF BY MOUTH TWO TIMES A DAY, Disp: 60 each, Rfl: 5  •  CINNAMON OR, Take by mouth., Disp: , Rfl:   •  aspirin 81 MG Oral Tab, Not on file      Number of children: Not on file      Years of education: Not on file      Highest education level: Not on file    Social Needs      Financial resource strain: Not on file      Food insecurity - worry: Not on file      Food insecurity - kwaku 31.06 kg/m²         Physical Examination:  General: Patient is alert and oriented x 3, not in acute distress. Psych:  Mood and affect appropriate  HEENT: EOMs intact. PERRL. Oropharynx is clear. Neck: No JVD.  Nodes largely resolved- barely palpable  Nec 01/17/2019     01/17/2019    CO2 28.0 01/17/2019     Lab Component   Component Value Date/Time     01/17/2019 1542     08/04/2014 1537         Impression and Plan:  Continued response to Ibrutinib with significant clinical improvement.

## 2019-01-28 DIAGNOSIS — E11.9 TYPE 2 DIABETES MELLITUS WITHOUT COMPLICATION, WITHOUT LONG-TERM CURRENT USE OF INSULIN (HCC): ICD-10-CM

## 2019-01-29 RX ORDER — GLIMEPIRIDE 2 MG/1
TABLET ORAL
Qty: 90 TABLET | Refills: 0 | Status: SHIPPED | OUTPATIENT
Start: 2019-01-29 | End: 2019-03-14

## 2019-01-29 RX ORDER — ALLOPURINOL 300 MG/1
TABLET ORAL
Qty: 30 TABLET | Refills: 0 | Status: SHIPPED | OUTPATIENT
Start: 2019-01-29 | End: 2019-03-14

## 2019-02-01 RX ORDER — ALLOPURINOL 100 MG/1
TABLET ORAL
Qty: 30 TABLET | Refills: 0 | OUTPATIENT
Start: 2019-02-01

## 2019-02-15 ENCOUNTER — MED REC SCAN ONLY (OUTPATIENT)
Dept: HEMATOLOGY/ONCOLOGY | Facility: HOSPITAL | Age: 54
End: 2019-02-15

## 2019-02-23 ENCOUNTER — OFFICE VISIT (OUTPATIENT)
Dept: FAMILY MEDICINE CLINIC | Facility: CLINIC | Age: 54
End: 2019-02-23
Payer: COMMERCIAL

## 2019-02-23 VITALS
HEIGHT: 74.02 IN | WEIGHT: 249 LBS | SYSTOLIC BLOOD PRESSURE: 124 MMHG | TEMPERATURE: 98 F | RESPIRATION RATE: 19 BRPM | OXYGEN SATURATION: 98 % | DIASTOLIC BLOOD PRESSURE: 68 MMHG | HEART RATE: 79 BPM | BODY MASS INDEX: 31.95 KG/M2

## 2019-02-23 DIAGNOSIS — M25.532 LEFT WRIST PAIN: Primary | ICD-10-CM

## 2019-02-23 PROCEDURE — 99213 OFFICE O/P EST LOW 20 MIN: CPT | Performed by: NURSE PRACTITIONER

## 2019-02-23 RX ORDER — METHYLPREDNISOLONE 4 MG/1
TABLET ORAL
Qty: 1 PACKAGE | Refills: 0 | Status: SHIPPED | OUTPATIENT
Start: 2019-02-23 | End: 2019-02-28

## 2019-02-23 NOTE — PROGRESS NOTES
CHIEF COMPLAINT:     Patient presents with:  Pain: L wrist pain, x 5 weeks. aspirin no injury started getting stiff       HPI:   Scott Lowry is a 48year old male who presents today with a chief complaint of left wrist pain.   Cause - unknown, denies injur METFORMIN  MG Oral Tab TAKE 1 TABLET BY MOUTH TWO TIMES A DAY WITH MEALS Disp: 60 tablet Rfl: 5   Biotin 5000 MCG Oral Cap Take 5,000 mcg by mouth daily.  Disp:  Rfl:    Fluticasone (FLOVENT DISKUS) 250 MCG/BLIST Inhalation Aerosol Powder, Breath A GENERAL: well developed, well nourished,in no apparent distress  SKIN: no rashes,no suspicious lesions  LUNGS: clear to auscultation  CARDIO: RRR without murmur  Left wrist:   Inspection/palpation: no ecchymosis, no swelling, no redness.  No effusion note

## 2019-02-28 ENCOUNTER — OFFICE VISIT (OUTPATIENT)
Dept: INTERNAL MEDICINE CLINIC | Facility: CLINIC | Age: 54
End: 2019-02-28
Payer: COMMERCIAL

## 2019-02-28 VITALS
HEART RATE: 88 BPM | TEMPERATURE: 98 F | DIASTOLIC BLOOD PRESSURE: 76 MMHG | SYSTOLIC BLOOD PRESSURE: 128 MMHG | HEIGHT: 74.02 IN | WEIGHT: 249 LBS | BODY MASS INDEX: 31.95 KG/M2 | RESPIRATION RATE: 16 BRPM | OXYGEN SATURATION: 97 %

## 2019-02-28 DIAGNOSIS — M25.532 CHRONIC PAIN OF LEFT WRIST: Primary | ICD-10-CM

## 2019-02-28 DIAGNOSIS — G89.29 CHRONIC PAIN OF LEFT WRIST: Primary | ICD-10-CM

## 2019-02-28 PROCEDURE — 99213 OFFICE O/P EST LOW 20 MIN: CPT | Performed by: NURSE PRACTITIONER

## 2019-02-28 NOTE — PROGRESS NOTES
HPI:    Patient ID: Luann Patel is a 48year old male. Wrist Pain    The pain is present in the left wrist. This is a chronic problem. The current episode started more than 1 month ago (6 weeks). There has been no history of extremity trauma.  The probl Prochlorperazine Maleate (COMPAZINE) 10 mg tablet Take 1 tablet (10 mg total) by mouth every 6 (six) hours as needed for Nausea. Disp: 30 tablet Rfl: 3   finasteride 5 MG Oral Tab Take 1 tablet (5 mg total) by mouth daily.  Disp: 90 tablet Rfl: 1   LES mood and affect. His behavior is normal.   Vitals reviewed.   /76   Pulse 88   Temp 98.1 °F (36.7 °C) (Oral)   Resp 16   Ht 74.02\"   Wt 249 lb   SpO2 97%   BMI 31.95 kg/m²        ASSESSMENT/PLAN:   Chronic pain of left wrist  (primary encounter diagn

## 2019-02-28 NOTE — PATIENT INSTRUCTIONS
Wrist brace  Tylenol 1000 mg 4x daily as needed  Call Dr. Aura Sanchez office for appt  Xray of wrist

## 2019-03-02 ENCOUNTER — HOSPITAL ENCOUNTER (OUTPATIENT)
Dept: GENERAL RADIOLOGY | Age: 54
Discharge: HOME | End: 2019-03-02
Attending: NURSE PRACTITIONER
Payer: COMMERCIAL

## 2019-03-02 DIAGNOSIS — G89.29 CHRONIC PAIN OF LEFT WRIST: ICD-10-CM

## 2019-03-02 DIAGNOSIS — M25.532 CHRONIC PAIN OF LEFT WRIST: ICD-10-CM

## 2019-03-02 PROCEDURE — 73110 X-RAY EXAM OF WRIST: CPT | Performed by: NURSE PRACTITIONER

## 2019-03-14 ENCOUNTER — OFFICE VISIT (OUTPATIENT)
Dept: HEMATOLOGY/ONCOLOGY | Facility: HOSPITAL | Age: 54
End: 2019-03-14
Attending: INTERNAL MEDICINE
Payer: COMMERCIAL

## 2019-03-14 VITALS
DIASTOLIC BLOOD PRESSURE: 75 MMHG | SYSTOLIC BLOOD PRESSURE: 143 MMHG | BODY MASS INDEX: 32.42 KG/M2 | HEART RATE: 99 BPM | TEMPERATURE: 98 F | OXYGEN SATURATION: 97 % | WEIGHT: 252.63 LBS | HEIGHT: 74.02 IN

## 2019-03-14 DIAGNOSIS — E88.3 TUMOR LYSIS SYNDROME: ICD-10-CM

## 2019-03-14 DIAGNOSIS — D63.0 ANEMIA IN NEOPLASTIC DISEASE: ICD-10-CM

## 2019-03-14 DIAGNOSIS — M10.9 GOUT, UNSPECIFIED CAUSE, UNSPECIFIED CHRONICITY, UNSPECIFIED SITE: ICD-10-CM

## 2019-03-14 DIAGNOSIS — D69.6 THROMBOCYTOPENIA (HCC): ICD-10-CM

## 2019-03-14 DIAGNOSIS — C91.10 CLL (CHRONIC LYMPHOCYTIC LEUKEMIA) (HCC): ICD-10-CM

## 2019-03-14 DIAGNOSIS — D72.820 LYMPHOCYTOSIS: ICD-10-CM

## 2019-03-14 DIAGNOSIS — R59.1 LYMPHADENOPATHY: ICD-10-CM

## 2019-03-14 DIAGNOSIS — M65.9 TENOSYNOVITIS OF FINGER: Primary | ICD-10-CM

## 2019-03-14 LAB
ALBUMIN SERPL-MCNC: 4.1 G/DL (ref 3.4–5)
ALBUMIN/GLOB SERPL: 1.6 {RATIO} (ref 1–2)
ALP LIVER SERPL-CCNC: 62 U/L (ref 45–117)
ALT SERPL-CCNC: 24 U/L (ref 16–61)
ANION GAP SERPL CALC-SCNC: 7 MMOL/L (ref 0–18)
AST SERPL-CCNC: 14 U/L (ref 15–37)
BASOPHILS # BLD AUTO: 0.29 X10(3) UL (ref 0–0.2)
BASOPHILS NFR BLD AUTO: 0.4 %
BILIRUB SERPL-MCNC: 0.3 MG/DL (ref 0.1–2)
BUN BLD-MCNC: 13 MG/DL (ref 7–18)
BUN/CREAT SERPL: 15.5 (ref 10–20)
CALCIUM BLD-MCNC: 9.3 MG/DL (ref 8.5–10.1)
CHLORIDE SERPL-SCNC: 107 MMOL/L (ref 98–107)
CO2 SERPL-SCNC: 24 MMOL/L (ref 21–32)
CREAT BLD-MCNC: 0.84 MG/DL (ref 0.7–1.3)
DEPRECATED RDW RBC AUTO: 42.1 FL (ref 35.1–46.3)
EOSINOPHIL # BLD AUTO: 0.13 X10(3) UL (ref 0–0.7)
EOSINOPHIL NFR BLD AUTO: 0.2 %
ERYTHROCYTE [DISTWIDTH] IN BLOOD BY AUTOMATED COUNT: 12.8 % (ref 11–15)
GLOBULIN PLAS-MCNC: 2.5 G/DL (ref 2.8–4.4)
GLUCOSE BLD-MCNC: 178 MG/DL (ref 70–99)
HCT VFR BLD AUTO: 42.1 % (ref 39–53)
HGB BLD-MCNC: 13.5 G/DL (ref 13–17.5)
IMM GRANULOCYTES # BLD AUTO: 0.1 X10(3) UL (ref 0–1)
IMM GRANULOCYTES NFR BLD: 0.1 %
LDH SERPL L TO P-CCNC: 145 U/L (ref 87–241)
LYMPHOCYTES # BLD AUTO: 64.85 X10(3) UL (ref 1–4)
LYMPHOCYTES NFR BLD AUTO: 92.1 %
M PROTEIN MFR SERPL ELPH: 6.6 G/DL (ref 6.4–8.2)
MCH RBC QN AUTO: 29.3 PG (ref 26–34)
MCHC RBC AUTO-ENTMCNC: 32.1 G/DL (ref 31–37)
MCV RBC AUTO: 91.3 FL (ref 80–100)
MONOCYTES # BLD AUTO: 0.41 X10(3) UL (ref 0.1–1)
MONOCYTES NFR BLD AUTO: 0.6 %
NEUTROPHILS # BLD AUTO: 4.63 X10 (3) UL (ref 1.5–7.7)
NEUTROPHILS # BLD AUTO: 4.63 X10(3) UL (ref 1.5–7.7)
NEUTROPHILS NFR BLD AUTO: 6.6 %
OSMOLALITY SERPL CALC.SUM OF ELEC: 291 MOSM/KG (ref 275–295)
PLATELET # BLD AUTO: 145 10(3)UL (ref 150–450)
POTASSIUM SERPL-SCNC: 4.4 MMOL/L (ref 3.5–5.1)
RBC # BLD AUTO: 4.61 X10(6)UL (ref 4.3–5.7)
SODIUM SERPL-SCNC: 138 MMOL/L (ref 136–145)
URATE SERPL-MCNC: 4.3 MG/DL (ref 3.5–7.2)
WBC # BLD AUTO: 70.4 X10(3) UL (ref 4–11)

## 2019-03-14 PROCEDURE — 99214 OFFICE O/P EST MOD 30 MIN: CPT | Performed by: INTERNAL MEDICINE

## 2019-03-14 NOTE — PROGRESS NOTES
Cancer Center Progress Note    Patient Name: Jaden Paul   YOB: 1965   Medical Record Number: [id number]   CSN: [id number]   Attending Physician: Ragini Kruse M.D.    Referring Physician: Eve Brantley MD      Date of Visit: 3/14/2019 as needed for Nausea., Disp: 30 tablet, Rfl: 3  •  finasteride 5 MG Oral Tab, Take 1 tablet (5 mg total) by mouth daily. , Disp: 90 tablet, Rfl: 1  •  BENAZEPRIL HCL 10 MG Oral Tab, TAKE 1 TABLET BY MOUTH ONE TIME A DAY , Disp: 30 tablet, Rfl: 5  •  ATORVAS Grandfather    • Asthma Mother    • Diabetes Paternal Grandmother        Psychosocial History:  Social History    Socioeconomic History      Marital status:       Spouse name: Not on file      Number of children: Not on file      Years of education: Sleep Concern: Not Asked        Stress Concern: Not Asked        Weight Concern: Not Asked        Special Diet: Not Asked        Back Care: Not Asked        Exercise: No        Bike Helmet: Not Asked        Seat Belt: Not Asked        Self-Exams: Not A 02/03/2014    MON 6 02/03/2014    EOS 3 07/20/2013    BASO 2 02/03/2014    NEPERCENT 4.2 01/17/2019    LYPERCENT 95.2 01/17/2019    MOPERCENT 0.3 01/17/2019    EOPERCENT 0.1 01/17/2019    BAPERCENT 0.0 01/17/2019    NE 5.64 01/17/2019    LYMABS 128.18 (H)

## 2019-04-09 DIAGNOSIS — I10 ESSENTIAL HYPERTENSION WITH GOAL BLOOD PRESSURE LESS THAN 130/80: ICD-10-CM

## 2019-04-09 DIAGNOSIS — E11.9 TYPE 2 DIABETES MELLITUS WITHOUT COMPLICATION, WITHOUT LONG-TERM CURRENT USE OF INSULIN (HCC): ICD-10-CM

## 2019-04-09 DIAGNOSIS — E78.2 MIXED HYPERLIPIDEMIA: ICD-10-CM

## 2019-04-09 RX ORDER — ATORVASTATIN CALCIUM 40 MG/1
TABLET, FILM COATED ORAL
Qty: 30 TABLET | Refills: 5 | Status: ON HOLD | OUTPATIENT
Start: 2019-04-09 | End: 2019-09-07

## 2019-04-09 RX ORDER — BENAZEPRIL HYDROCHLORIDE 10 MG/1
TABLET ORAL
Qty: 30 TABLET | Refills: 5 | Status: ON HOLD | OUTPATIENT
Start: 2019-04-09 | End: 2019-09-07

## 2019-04-15 ENCOUNTER — TELEPHONE (OUTPATIENT)
Dept: INTERNAL MEDICINE CLINIC | Facility: CLINIC | Age: 54
End: 2019-04-15

## 2019-04-15 DIAGNOSIS — I10 ESSENTIAL HYPERTENSION: Primary | ICD-10-CM

## 2019-04-15 DIAGNOSIS — E11.9 TYPE 2 DIABETES MELLITUS WITHOUT COMPLICATION, WITHOUT LONG-TERM CURRENT USE OF INSULIN (HCC): ICD-10-CM

## 2019-04-15 DIAGNOSIS — J30.89 NON-SEASONAL ALLERGIC RHINITIS, UNSPECIFIED TRIGGER: ICD-10-CM

## 2019-04-15 DIAGNOSIS — E78.2 MIXED HYPERLIPIDEMIA: ICD-10-CM

## 2019-04-15 DIAGNOSIS — Z12.5 SCREENING FOR MALIGNANT NEOPLASM OF PROSTATE: ICD-10-CM

## 2019-04-15 DIAGNOSIS — Z00.00 LABORATORY EXAMINATION ORDERED AS PART OF A ROUTINE GENERAL MEDICAL EXAMINATION: ICD-10-CM

## 2019-04-15 DIAGNOSIS — C91.10 CLL (CHRONIC LYMPHOCYTIC LEUKEMIA) (HCC): ICD-10-CM

## 2019-04-15 DIAGNOSIS — N52.02 CORPORO-VENOUS OCCLUSIVE ERECTILE DYSFUNCTION: ICD-10-CM

## 2019-04-15 NOTE — TELEPHONE ENCOUNTER
Patient called and requested labs for Jia[facility].  Patient is scheduled later this month for an annual, and is aware to fast.

## 2019-04-20 ENCOUNTER — LAB ENCOUNTER (OUTPATIENT)
Dept: LAB | Age: 54
End: 2019-04-20
Attending: INTERNAL MEDICINE
Payer: COMMERCIAL

## 2019-04-20 DIAGNOSIS — Z00.00 LABORATORY EXAMINATION ORDERED AS PART OF A ROUTINE GENERAL MEDICAL EXAMINATION: ICD-10-CM

## 2019-04-20 DIAGNOSIS — J30.89 NON-SEASONAL ALLERGIC RHINITIS, UNSPECIFIED TRIGGER: ICD-10-CM

## 2019-04-20 DIAGNOSIS — E11.9 TYPE 2 DIABETES MELLITUS WITHOUT COMPLICATION, WITHOUT LONG-TERM CURRENT USE OF INSULIN (HCC): ICD-10-CM

## 2019-04-20 DIAGNOSIS — E78.2 MIXED HYPERLIPIDEMIA: ICD-10-CM

## 2019-04-20 DIAGNOSIS — Z12.5 SCREENING FOR MALIGNANT NEOPLASM OF PROSTATE: ICD-10-CM

## 2019-04-20 DIAGNOSIS — I10 ESSENTIAL HYPERTENSION: ICD-10-CM

## 2019-04-20 DIAGNOSIS — N52.02 CORPORO-VENOUS OCCLUSIVE ERECTILE DYSFUNCTION: ICD-10-CM

## 2019-04-20 DIAGNOSIS — C91.10 CLL (CHRONIC LYMPHOCYTIC LEUKEMIA) (HCC): ICD-10-CM

## 2019-04-20 PROCEDURE — 84153 ASSAY OF PSA TOTAL: CPT | Performed by: INTERNAL MEDICINE

## 2019-04-20 PROCEDURE — 80050 GENERAL HEALTH PANEL: CPT | Performed by: INTERNAL MEDICINE

## 2019-04-20 PROCEDURE — 36415 COLL VENOUS BLD VENIPUNCTURE: CPT | Performed by: INTERNAL MEDICINE

## 2019-04-20 PROCEDURE — 81001 URINALYSIS AUTO W/SCOPE: CPT | Performed by: INTERNAL MEDICINE

## 2019-04-20 PROCEDURE — 83036 HEMOGLOBIN GLYCOSYLATED A1C: CPT | Performed by: INTERNAL MEDICINE

## 2019-04-20 PROCEDURE — 82043 UR ALBUMIN QUANTITATIVE: CPT | Performed by: INTERNAL MEDICINE

## 2019-04-20 PROCEDURE — 82570 ASSAY OF URINE CREATININE: CPT | Performed by: INTERNAL MEDICINE

## 2019-04-20 PROCEDURE — 80061 LIPID PANEL: CPT | Performed by: INTERNAL MEDICINE

## 2019-04-22 ENCOUNTER — TELEPHONE (OUTPATIENT)
Dept: INTERNAL MEDICINE CLINIC | Facility: CLINIC | Age: 54
End: 2019-04-22

## 2019-04-22 DIAGNOSIS — R31.9 HEMATURIA, UNSPECIFIED TYPE: Primary | ICD-10-CM

## 2019-04-22 NOTE — TELEPHONE ENCOUNTER
----- Message from Essence Christie MD sent at 4/22/2019  8:44 AM CDT -----  Review at follow up  Check ct abd/pelvis w and w/o contrast eval hematuria

## 2019-04-22 NOTE — TELEPHONE ENCOUNTER
The pt has a scheduled appointment on 5/3/2019 with Dr. Monica Gleason. The pt is aware of the hematuria in the UA results. Order for CT was entered.  The pt will call to schedule when the referral team contacts the pt with the approval. The pt does have the cent

## 2019-05-03 ENCOUNTER — OFFICE VISIT (OUTPATIENT)
Dept: INTERNAL MEDICINE CLINIC | Facility: CLINIC | Age: 54
End: 2019-05-03
Payer: COMMERCIAL

## 2019-05-03 ENCOUNTER — MED REC SCAN ONLY (OUTPATIENT)
Dept: INTERNAL MEDICINE CLINIC | Facility: CLINIC | Age: 54
End: 2019-05-03

## 2019-05-03 VITALS
WEIGHT: 252.38 LBS | HEIGHT: 74 IN | RESPIRATION RATE: 16 BRPM | BODY MASS INDEX: 32.39 KG/M2 | HEART RATE: 84 BPM | DIASTOLIC BLOOD PRESSURE: 60 MMHG | SYSTOLIC BLOOD PRESSURE: 110 MMHG | TEMPERATURE: 98 F

## 2019-05-03 DIAGNOSIS — Z00.00 ANNUAL PHYSICAL EXAM: Primary | ICD-10-CM

## 2019-05-03 DIAGNOSIS — E11.9 TYPE 2 DIABETES MELLITUS WITHOUT COMPLICATION, WITHOUT LONG-TERM CURRENT USE OF INSULIN (HCC): ICD-10-CM

## 2019-05-03 PROCEDURE — 99396 PREV VISIT EST AGE 40-64: CPT | Performed by: INTERNAL MEDICINE

## 2019-05-03 NOTE — PROGRESS NOTES
Dr Myles at bedside.   HPI:    Patient ID: Angie Loyd is a 48year old male.     HPI  Angie Loyd is a 48year old male who presents for a complete physical exam.   HPI:   Pt complains of nothing  Reports poor dietary adherence  Denies cp or sob  Tolerating meds    PAST MEDICA ATORVASTATIN 40 MG Oral Tab TAKE 1 TABLET BY MOUTH ONE TIME A DAY  Disp: 30 tablet Rfl: 5   Albuterol Sulfate HFA (PROAIR HFA) 108 (90 Base) MCG/ACT Inhalation Aero Soln INHALE 2 PUFFS BY MOUTH EVERY SIX HOURS AS NEEDED for wheezing Disp: 8.5 g Rfl: 2 hypertension    • Visual impairment     glasses      Past Surgical History:   Procedure Laterality Date   • CERVICAL LYMPH NODE BX Right 5/27/2016    Performed by Costa Vincent MD at [facility] MAIN OR   • COLONOSCOPY     • OTHER  05/27/2016    cervical lad are clear  NECK: supple,no adenopathy,no bruits  LUNGS: clear to auscultation  CARDIO: RRR without murmur  GI: good BS's,no masses, HSM or tenderness  : deferred  MUSCULOSKELETAL: back is not tender,FROM of the back  EXTREMITIES: no cyanosis, clubbing or ONE TIME A DAY AS NEEDED FOR ERECTILE DYSFUNCTION Disp: 6 tablet Rfl: 5   Ibrutinib 420 MG Oral Tab Take 1 tablet (420 mg total) by mouth every morning.  Disp: 30 tablet Rfl: 11   Prochlorperazine Maleate (COMPAZINE) 10 mg tablet Take 1 tablet (10 mg total)

## 2019-05-04 ENCOUNTER — HOSPITAL ENCOUNTER (OUTPATIENT)
Dept: CT IMAGING | Age: 54
Discharge: HOME | End: 2019-05-04
Attending: INTERNAL MEDICINE
Payer: COMMERCIAL

## 2019-05-04 DIAGNOSIS — R31.9 HEMATURIA, UNSPECIFIED TYPE: ICD-10-CM

## 2019-05-04 PROCEDURE — 74178 CT ABD&PLV WO CNTR FLWD CNTR: CPT | Performed by: INTERNAL MEDICINE

## 2019-05-06 ENCOUNTER — TELEPHONE (OUTPATIENT)
Dept: INTERNAL MEDICINE CLINIC | Facility: CLINIC | Age: 54
End: 2019-05-06

## 2019-05-06 DIAGNOSIS — R31.9 HEMATURIA, UNSPECIFIED TYPE: Primary | ICD-10-CM

## 2019-05-06 NOTE — TELEPHONE ENCOUNTER
----- Message from Liliana Salvador MD sent at 5/6/2019  8:36 AM CDT -----  Mild abdominal lymphadenopathy is noted although the adenopathy is markedly improved compared to CTs from 6/24/2016 and 6/6/2017 this is related to his history of lymphocytic leukemia

## 2019-05-06 NOTE — TELEPHONE ENCOUNTER
[address]  [address]. Referral pending. Results and recommendations d/w pt he expressed understanding.  He asked that referral information be sent to him via [employer] and this

## 2019-05-26 DIAGNOSIS — E11.9 TYPE 2 DIABETES MELLITUS WITHOUT COMPLICATION, WITHOUT LONG-TERM CURRENT USE OF INSULIN (HCC): ICD-10-CM

## 2019-05-28 RX ORDER — GLIMEPIRIDE 2 MG/1
TABLET ORAL
Qty: 30 TABLET | Refills: 5 | Status: SHIPPED | OUTPATIENT
Start: 2019-05-28 | End: 2019-11-25

## 2019-06-06 ENCOUNTER — OFFICE VISIT (OUTPATIENT)
Dept: HEMATOLOGY/ONCOLOGY | Facility: HOSPITAL | Age: 54
End: 2019-06-06
Attending: INTERNAL MEDICINE
Payer: COMMERCIAL

## 2019-06-06 VITALS
WEIGHT: 255 LBS | TEMPERATURE: 97 F | BODY MASS INDEX: 32.73 KG/M2 | DIASTOLIC BLOOD PRESSURE: 74 MMHG | RESPIRATION RATE: 18 BRPM | HEIGHT: 74.02 IN | HEART RATE: 84 BPM | OXYGEN SATURATION: 97 % | SYSTOLIC BLOOD PRESSURE: 116 MMHG

## 2019-06-06 DIAGNOSIS — M79.89 LEG SWELLING: ICD-10-CM

## 2019-06-06 DIAGNOSIS — R31.21 ASYMPTOMATIC MICROSCOPIC HEMATURIA: Primary | ICD-10-CM

## 2019-06-06 DIAGNOSIS — R07.89 OTHER CHEST PAIN: ICD-10-CM

## 2019-06-06 DIAGNOSIS — C91.10 CLL (CHRONIC LYMPHOCYTIC LEUKEMIA) (HCC): ICD-10-CM

## 2019-06-06 DIAGNOSIS — R59.9 ADENOPATHY: ICD-10-CM

## 2019-06-06 DIAGNOSIS — D69.6 THROMBOCYTOPENIA (HCC): ICD-10-CM

## 2019-06-06 PROCEDURE — 99215 OFFICE O/P EST HI 40 MIN: CPT | Performed by: INTERNAL MEDICINE

## 2019-06-06 NOTE — PROGRESS NOTES
Cancer Center Progress Note    Patient Name: Denise Treadwell   YOB: 1965   Medical Record Number: [id number]   CSN: [id number]   Attending Physician: Cheng Cleveland M.D.    Referring Physician: Madhavi Navarro MD      Date of Visit: 6/6/2019     ANA Inhalation Aero Soln, INHALE 2 PUFFS BY MOUTH EVERY SIX HOURS AS NEEDED for wheezing, Disp: 8.5 g, Rfl: 2  •  SILDENAFIL CITRATE 100 MG Oral Tab, TAKE 1 TABLET BY MOUTH ONE TIME A DAY AS NEEDED FOR ERECTILE DYSFUNCTION, Disp: 6 tablet, Rfl: 5  •  Ibrutinib Sammi Mcfarland MD at [facility] MAIN OR   • COLONOSCOPY     • OTHER  05/27/2016    cervical lad       Family Medical History:  Family History   Problem Relation Age of Onset   • Other (CHF) Maternal Grandmother    • Other (Acute Myocardial Infarction) Paternal Not on file    Other Topics      Concerns:         Service: Not Asked        Blood Transfusions: Not Asked        Caffeine Concern: Yes          32-40 oz per day/coffee/tea        Occupational Exposure: Not Asked        Hobby Hazards: Not Asked 06/06/2019    NEUTABS 8.20 (H) 04/20/2019    LYMPHABS 53.64 (H) 04/20/2019    EOSABS 0.00 04/20/2019    BASABS 0.00 04/20/2019    NEUT 13 04/20/2019    LYMPH 85 04/20/2019    MON 2 04/20/2019    EOS 0 04/20/2019    BASO 0 04/20/2019    NEPERCENT 16.9 06/06 Radiology Data Registry) which includes the Dose Index Registry. PATIENT STATED HISTORY:(As transcribed by Technologist)  Patient had microscopic hematuria during a routine physical exam on 4/20/19.  Patient has a history of CLL and has occadional pain ORGANS:  Prostate is mildly enlarged, indenting the floor of the bladder. BONES:  No bony lesion or fracture. LUNG BASES:  No visible pulmonary or pleural disease. OTHER:  Negative.       =====  CONCLUSION:    1.  Mild abdominal lymphadenopathy is no placed on treatment for CLL) which was attributed to stones. No stones seen on CT.  Though platelets somewhat low and Ibrutinib causing platelet dysfunction should not have spontaneous bleeding at current levels- would evaluate for possible source and encou

## 2019-06-07 DIAGNOSIS — L65.9 ALOPECIA: ICD-10-CM

## 2019-06-10 RX ORDER — FINASTERIDE 5 MG/1
TABLET, FILM COATED ORAL
Qty: 30 TABLET | Refills: 5 | Status: SHIPPED | OUTPATIENT
Start: 2019-06-10 | End: 2020-09-30

## 2019-06-13 ENCOUNTER — APPOINTMENT (OUTPATIENT)
Dept: HEMATOLOGY/ONCOLOGY | Facility: HOSPITAL | Age: 54
End: 2019-06-13
Attending: INTERNAL MEDICINE
Payer: COMMERCIAL

## 2019-09-01 ENCOUNTER — APPOINTMENT (OUTPATIENT)
Dept: GENERAL RADIOLOGY | Age: 54
End: 2019-09-01
Attending: EMERGENCY MEDICINE
Payer: COMMERCIAL

## 2019-09-01 ENCOUNTER — HOSPITAL ENCOUNTER (EMERGENCY)
Age: 54
Discharge: HOME | End: 2019-09-01
Attending: EMERGENCY MEDICINE
Payer: COMMERCIAL

## 2019-09-01 ENCOUNTER — APPOINTMENT (OUTPATIENT)
Dept: CT IMAGING | Age: 54
End: 2019-09-01
Attending: EMERGENCY MEDICINE
Payer: COMMERCIAL

## 2019-09-01 VITALS
SYSTOLIC BLOOD PRESSURE: 120 MMHG | WEIGHT: 253 LBS | HEIGHT: 74 IN | HEART RATE: 101 BPM | TEMPERATURE: 98 F | DIASTOLIC BLOOD PRESSURE: 57 MMHG | RESPIRATION RATE: 16 BRPM | BODY MASS INDEX: 32.47 KG/M2 | OXYGEN SATURATION: 98 %

## 2019-09-01 DIAGNOSIS — E11.65 TYPE 2 DIABETES MELLITUS WITH HYPERGLYCEMIA, UNSPECIFIED WHETHER LONG TERM INSULIN USE (HCC): ICD-10-CM

## 2019-09-01 DIAGNOSIS — R50.9 ACUTE FEBRILE ILLNESS: Primary | ICD-10-CM

## 2019-09-01 DIAGNOSIS — E86.0 DEHYDRATION: ICD-10-CM

## 2019-09-01 LAB
ADENOVIRUS PCR:: NEGATIVE
ALBUMIN SERPL-MCNC: 4.1 G/DL (ref 3.4–5)
ALBUMIN/GLOB SERPL: 1.3 {RATIO} (ref 1–2)
ALP LIVER SERPL-CCNC: 57 U/L (ref 45–117)
ALT SERPL-CCNC: 47 U/L (ref 16–61)
ANION GAP SERPL CALC-SCNC: 8 MMOL/L (ref 0–18)
AST SERPL-CCNC: 34 U/L (ref 15–37)
B PERT DNA SPEC QL NAA+PROBE: NEGATIVE
BASOPHILS # BLD AUTO: 0.02 X10(3) UL (ref 0–0.2)
BASOPHILS NFR BLD AUTO: 0.2 %
BILIRUB SERPL-MCNC: 1 MG/DL (ref 0.1–2)
BUN BLD-MCNC: 8 MG/DL (ref 7–18)
BUN/CREAT SERPL: 6.7 (ref 10–20)
C PNEUM DNA SPEC QL NAA+PROBE: NEGATIVE
CALCIUM BLD-MCNC: 9.4 MG/DL (ref 8.5–10.1)
CHLORIDE SERPL-SCNC: 98 MMOL/L (ref 98–112)
CO2 SERPL-SCNC: 27 MMOL/L (ref 21–32)
COLOR UR AUTO: YELLOW
CORONAVIRUS 229E PCR:: NEGATIVE
CORONAVIRUS HKU1 PCR:: NEGATIVE
CORONAVIRUS NL63 PCR:: NEGATIVE
CORONAVIRUS OC43 PCR:: NEGATIVE
CREAT BLD-MCNC: 1.19 MG/DL (ref 0.7–1.3)
DEPRECATED RDW RBC AUTO: 39.6 FL (ref 35.1–46.3)
EOSINOPHIL # BLD AUTO: 0 X10(3) UL (ref 0–0.7)
EOSINOPHIL NFR BLD AUTO: 0 %
ERYTHROCYTE [DISTWIDTH] IN BLOOD BY AUTOMATED COUNT: 12.2 % (ref 11–15)
FLUAV RNA SPEC QL NAA+PROBE: NEGATIVE
FLUBV RNA SPEC QL NAA+PROBE: NEGATIVE
GLOBULIN PLAS-MCNC: 3.1 G/DL (ref 2.8–4.4)
GLUCOSE BLD-MCNC: 227 MG/DL (ref 70–99)
GLUCOSE BLD-MCNC: 244 MG/DL (ref 70–99)
GLUCOSE UR STRIP.AUTO-MCNC: 100 MG/DL
HCT VFR BLD AUTO: 46.3 % (ref 39–53)
HGB BLD-MCNC: 15 G/DL (ref 13–17.5)
IMM GRANULOCYTES # BLD AUTO: 0.03 X10(3) UL (ref 0–1)
IMM GRANULOCYTES NFR BLD: 0.4 %
KETONES UR STRIP.AUTO-MCNC: 80 MG/DL
LEUKOCYTE ESTERASE UR QL STRIP.AUTO: NEGATIVE
LYMPHOCYTES # BLD AUTO: 3.71 X10(3) UL (ref 1–4)
LYMPHOCYTES NFR BLD AUTO: 45.6 %
M PROTEIN MFR SERPL ELPH: 7.2 G/DL (ref 6.4–8.2)
MCH RBC QN AUTO: 28.7 PG (ref 26–34)
MCHC RBC AUTO-ENTMCNC: 32.4 G/DL (ref 31–37)
MCV RBC AUTO: 88.5 FL (ref 80–100)
METAPNEUMOVIRUS PCR:: NEGATIVE
MONOCYTES # BLD AUTO: 0.33 X10(3) UL (ref 0.1–1)
MONOCYTES NFR BLD AUTO: 4.1 %
MYCOPLASMA PNEUMONIA PCR:: NEGATIVE
NEUTROPHILS # BLD AUTO: 4.05 X10 (3) UL (ref 1.5–7.7)
NEUTROPHILS # BLD AUTO: 4.05 X10(3) UL (ref 1.5–7.7)
NEUTROPHILS NFR BLD AUTO: 49.7 %
NITRITE UR QL STRIP.AUTO: NEGATIVE
OSMOLALITY SERPL CALC.SUM OF ELEC: 282 MOSM/KG (ref 275–295)
PARAINFLUENZA 1 PCR:: NEGATIVE
PARAINFLUENZA 2 PCR:: NEGATIVE
PARAINFLUENZA 3 PCR:: NEGATIVE
PARAINFLUENZA 4 PCR:: NEGATIVE
PH UR STRIP.AUTO: 6 [PH] (ref 4.5–8)
PLATELET # BLD AUTO: 92 10(3)UL (ref 150–450)
POTASSIUM SERPL-SCNC: 4.2 MMOL/L (ref 3.5–5.1)
RBC # BLD AUTO: 5.23 X10(6)UL (ref 4.3–5.7)
RBC #/AREA URNS AUTO: >10 /HPF
RHINOVIRUS/ENTERO PCR:: NEGATIVE
RSV RNA SPEC QL NAA+PROBE: NEGATIVE
SODIUM SERPL-SCNC: 133 MMOL/L (ref 136–145)
SP GR UR STRIP.AUTO: 1.01 (ref 1–1.03)
UROBILINOGEN UR STRIP.AUTO-MCNC: 0.2 MG/DL
WBC # BLD AUTO: 8.1 X10(3) UL (ref 4–11)

## 2019-09-01 PROCEDURE — 87798 DETECT AGENT NOS DNA AMP: CPT | Performed by: EMERGENCY MEDICINE

## 2019-09-01 PROCEDURE — 96361 HYDRATE IV INFUSION ADD-ON: CPT

## 2019-09-01 PROCEDURE — 87486 CHLMYD PNEUM DNA AMP PROBE: CPT | Performed by: EMERGENCY MEDICINE

## 2019-09-01 PROCEDURE — 99284 EMERGENCY DEPT VISIT MOD MDM: CPT

## 2019-09-01 PROCEDURE — 87040 BLOOD CULTURE FOR BACTERIA: CPT | Performed by: EMERGENCY MEDICINE

## 2019-09-01 PROCEDURE — 71046 X-RAY EXAM CHEST 2 VIEWS: CPT | Performed by: EMERGENCY MEDICINE

## 2019-09-01 PROCEDURE — 82962 GLUCOSE BLOOD TEST: CPT

## 2019-09-01 PROCEDURE — 81001 URINALYSIS AUTO W/SCOPE: CPT | Performed by: EMERGENCY MEDICINE

## 2019-09-01 PROCEDURE — 85025 COMPLETE CBC W/AUTO DIFF WBC: CPT | Performed by: EMERGENCY MEDICINE

## 2019-09-01 PROCEDURE — 36415 COLL VENOUS BLD VENIPUNCTURE: CPT

## 2019-09-01 PROCEDURE — 80053 COMPREHEN METABOLIC PANEL: CPT | Performed by: EMERGENCY MEDICINE

## 2019-09-01 PROCEDURE — 70450 CT HEAD/BRAIN W/O DYE: CPT | Performed by: EMERGENCY MEDICINE

## 2019-09-01 PROCEDURE — 87581 M.PNEUMON DNA AMP PROBE: CPT | Performed by: EMERGENCY MEDICINE

## 2019-09-01 PROCEDURE — 96360 HYDRATION IV INFUSION INIT: CPT

## 2019-09-01 PROCEDURE — 87633 RESP VIRUS 12-25 TARGETS: CPT | Performed by: EMERGENCY MEDICINE

## 2019-09-01 PROCEDURE — 99285 EMERGENCY DEPT VISIT HI MDM: CPT

## 2019-09-01 RX ORDER — ONDANSETRON 8 MG/1
8 TABLET, ORALLY DISINTEGRATING ORAL EVERY 6 HOURS PRN
Qty: 10 TABLET | Refills: 0 | Status: SHIPPED | OUTPATIENT
Start: 2019-09-01 | End: 2019-09-19

## 2019-09-01 RX ORDER — ACETAMINOPHEN 500 MG
1000 TABLET ORAL ONCE
Status: COMPLETED | OUTPATIENT
Start: 2019-09-01 | End: 2019-09-01

## 2019-09-01 RX ORDER — KETOROLAC TROMETHAMINE 30 MG/ML
30 INJECTION, SOLUTION INTRAMUSCULAR; INTRAVENOUS ONCE
Status: DISCONTINUED | OUTPATIENT
Start: 2019-09-01 | End: 2019-09-01

## 2019-09-01 NOTE — ED PROVIDER NOTES
Patient Seen in: THE [facility] In [place]    History   Patient presents with:  Headache (neurologic)  Body ache and/or chills    Stated Complaint: Headache, bodyache, chills.      HPI    Patient has a history of high blood pressure, high Types: Cigarettes        Quit date: 2001        Years since quittin.3      Smokeless tobacco: Never Used      Tobacco comment: currently 1 cigar daily - does not inhale    Alcohol use: Yes      Frequency: 2-3 times a week      Comment: 4-6 Be Glucose 244 (*)     Sodium 133 (*)     BUN/CREA Ratio 6.7 (*)     All other components within normal limits   URINALYSIS WITH CULTURE REFLEX - Abnormal; Notable for the following components:    Clarity Urine Slightly Cloudy (*)     Glucose Urine 100  Lorette Fails Thrombocytopenia requires follow-up. This is a potential complications of patient's Ibrutinib. Metabolic panel shows mild hyponatremia corrected with the IV fluid administered.   Creatinine is normal.  LFTs normal  Urinalysis shows ketones consistent with antibiotic at this point    Disposition and Plan     Clinical Impression:  Acute febrile illness  (primary encounter diagnosis)  Dehydration  Type 2 diabetes mellitus with hyperglycemia, unspecified whether long term insulin use (HCC)    Disposition:  Disc

## 2019-09-03 ENCOUNTER — TELEPHONE (OUTPATIENT)
Dept: INTERNAL MEDICINE CLINIC | Facility: CLINIC | Age: 54
End: 2019-09-03

## 2019-09-03 NOTE — TELEPHONE ENCOUNTER
Spoke with pt and reviewed no growth on blood cultures as of yet, respiratory panel normal, no elevation in WBC indicating infection, lower platelets d/w Dr. Reyez Child, sodium a little low due to illness but kidney/liver function and electrolytes are normal.

## 2019-09-04 ENCOUNTER — OFFICE VISIT (OUTPATIENT)
Dept: HEMATOLOGY/ONCOLOGY | Facility: HOSPITAL | Age: 54
End: 2019-09-04

## 2019-09-04 ENCOUNTER — TELEPHONE (OUTPATIENT)
Dept: INTERNAL MEDICINE CLINIC | Facility: CLINIC | Age: 54
End: 2019-09-04

## 2019-09-04 ENCOUNTER — TELEPHONE (OUTPATIENT)
Dept: HEMATOLOGY/ONCOLOGY | Facility: HOSPITAL | Age: 54
End: 2019-09-04

## 2019-09-04 ENCOUNTER — APPOINTMENT (OUTPATIENT)
Dept: HEMATOLOGY/ONCOLOGY | Facility: HOSPITAL | Age: 54
End: 2019-09-04
Attending: INTERNAL MEDICINE
Payer: COMMERCIAL

## 2019-09-04 DIAGNOSIS — E86.0 DEHYDRATION: ICD-10-CM

## 2019-09-04 DIAGNOSIS — C91.10 CLL (CHRONIC LYMPHOCYTIC LEUKEMIA) (HCC): ICD-10-CM

## 2019-09-04 DIAGNOSIS — R19.7 DIARRHEA, UNSPECIFIED TYPE: Primary | ICD-10-CM

## 2019-09-04 NOTE — TELEPHONE ENCOUNTER
Spoke with pt he continues to have diarrhea and not feel well after ER visit for febrile illness. Offered appt today he declined but did schedule appt for tomorrow. Spoke with him and his spouse and encouraged BRAT diet and pushing fluids.  They expressed

## 2019-09-04 NOTE — TELEPHONE ENCOUNTER
Toxicities: Ibrutinib 420mg po daily    Due for f/u appt on 9/13/2019 with Dr Moncho Gaston nose & Achiness/Fatigue/Anorexia/Dehydration/Diarrhea     Runny Nose/achiness:(Since Friday)  Fatigue: Grade 2(Since Friday increased weakness & fatigue)  Anore

## 2019-09-04 NOTE — TELEPHONE ENCOUNTER
Spoke with patient. He was under the impression he had an acute care visit scheduled for tomorrow in [place]. Will rescheduled. FYI to OLIVIA and MA.

## 2019-09-04 NOTE — TELEPHONE ENCOUNTER
Patient was at THE [facility] ER on 9/1 but says he is still having diarrhea and loss of appetite. Offered an appointment today but the patient asked to speak to a nurse first. Please c/b on cell #.

## 2019-09-05 ENCOUNTER — HOSPITAL ENCOUNTER (INPATIENT)
Facility: HOSPITAL | Age: 54
LOS: 2 days | Discharge: HOME | DRG: 841 | End: 2019-09-07
Attending: HOSPITALIST | Admitting: HOSPITALIST
Payer: COMMERCIAL

## 2019-09-05 ENCOUNTER — OFFICE VISIT (OUTPATIENT)
Dept: HEMATOLOGY/ONCOLOGY | Facility: HOSPITAL | Age: 54
End: 2019-09-05
Attending: INTERNAL MEDICINE
Payer: COMMERCIAL

## 2019-09-05 VITALS
SYSTOLIC BLOOD PRESSURE: 188 MMHG | HEIGHT: 74.02 IN | BODY MASS INDEX: 31.24 KG/M2 | WEIGHT: 243.38 LBS | RESPIRATION RATE: 18 BRPM | HEART RATE: 97 BPM | TEMPERATURE: 96 F | OXYGEN SATURATION: 99 % | DIASTOLIC BLOOD PRESSURE: 78 MMHG

## 2019-09-05 VITALS
DIASTOLIC BLOOD PRESSURE: 73 MMHG | TEMPERATURE: 100 F | SYSTOLIC BLOOD PRESSURE: 112 MMHG | RESPIRATION RATE: 18 BRPM | HEART RATE: 112 BPM | OXYGEN SATURATION: 96 %

## 2019-09-05 DIAGNOSIS — B37.0 ORAL CANDIDIASIS: ICD-10-CM

## 2019-09-05 DIAGNOSIS — B37.0 ORAL CANDIDIASIS: Primary | ICD-10-CM

## 2019-09-05 DIAGNOSIS — R52 GENERALIZED BODY ACHES: ICD-10-CM

## 2019-09-05 DIAGNOSIS — C91.10 CLL (CHRONIC LYMPHOCYTIC LEUKEMIA) (HCC): Primary | ICD-10-CM

## 2019-09-05 DIAGNOSIS — C91.10 CLL (CHRONIC LYMPHOCYTIC LEUKEMIA) (HCC): ICD-10-CM

## 2019-09-05 DIAGNOSIS — E87.1 HYPONATREMIA: ICD-10-CM

## 2019-09-05 DIAGNOSIS — D69.6 THROMBOCYTOPENIA (HCC): ICD-10-CM

## 2019-09-05 DIAGNOSIS — R74.01 TRANSAMINITIS: Primary | ICD-10-CM

## 2019-09-05 LAB
ALBUMIN SERPL-MCNC: 3.3 G/DL (ref 3.4–5)
ALBUMIN/GLOB SERPL: 1 {RATIO} (ref 1–2)
ALP LIVER SERPL-CCNC: 108 U/L (ref 45–117)
ALT SERPL-CCNC: 471 U/L (ref 16–61)
ANION GAP SERPL CALC-SCNC: 10 MMOL/L (ref 0–18)
AST SERPL-CCNC: 496 U/L (ref 15–37)
BASOPHILS # BLD AUTO: 0.03 X10(3) UL (ref 0–0.2)
BASOPHILS NFR BLD AUTO: 0.4 %
BILIRUB SERPL-MCNC: 0.9 MG/DL (ref 0.1–2)
BUN BLD-MCNC: 14 MG/DL (ref 7–18)
BUN/CREAT SERPL: 12.3 (ref 10–20)
CALCIUM BLD-MCNC: 8.1 MG/DL (ref 8.5–10.1)
CHLORIDE SERPL-SCNC: 91 MMOL/L (ref 98–112)
CO2 SERPL-SCNC: 25 MMOL/L (ref 21–32)
CREAT BLD-MCNC: 1.14 MG/DL (ref 0.7–1.3)
DEPRECATED RDW RBC AUTO: 37.7 FL (ref 35.1–46.3)
EBV NA IGG SER QL IA: POSITIVE
EBV VCA IGG SER QL IA: POSITIVE
EBV VCA IGM SER QL IA: NEGATIVE
EOSINOPHIL # BLD AUTO: 0 X10(3) UL (ref 0–0.7)
EOSINOPHIL NFR BLD AUTO: 0 %
ERYTHROCYTE [DISTWIDTH] IN BLOOD BY AUTOMATED COUNT: 12.4 % (ref 11–15)
EST. AVERAGE GLUCOSE BLD GHB EST-MCNC: 194 MG/DL (ref 68–126)
GLOBULIN PLAS-MCNC: 3.2 G/DL (ref 2.8–4.4)
GLUCOSE BLD-MCNC: 162 MG/DL (ref 70–99)
GLUCOSE BLD-MCNC: 196 MG/DL (ref 70–99)
GLUCOSE BLD-MCNC: 268 MG/DL (ref 70–99)
HBA1C MFR BLD HPLC: 8.4 % (ref ?–5.7)
HCT VFR BLD AUTO: 39.1 % (ref 39–53)
HGB BLD-MCNC: 13.4 G/DL (ref 13–17.5)
IMM GRANULOCYTES # BLD AUTO: 0.02 X10(3) UL (ref 0–1)
IMM GRANULOCYTES NFR BLD: 0.3 %
IMMUNOGLOBULIN PNL SER-MCNC: 179 MG/DL (ref 791–1643)
LDH SERPL L TO P-CCNC: 1050 U/L (ref 87–241)
LYMPHOCYTES # BLD AUTO: 3.71 X10(3) UL (ref 1–4)
LYMPHOCYTES NFR BLD AUTO: 55.3 %
M PROTEIN MFR SERPL ELPH: 6.5 G/DL (ref 6.4–8.2)
MCH RBC QN AUTO: 28.9 PG (ref 26–34)
MCHC RBC AUTO-ENTMCNC: 34.3 G/DL (ref 31–37)
MCV RBC AUTO: 84.4 FL (ref 80–100)
MONOCYTES # BLD AUTO: 0.25 X10(3) UL (ref 0.1–1)
MONOCYTES NFR BLD AUTO: 3.7 %
MONOSCREEN: NEGATIVE
NEUTROPHILS # BLD AUTO: 2.7 X10 (3) UL (ref 1.5–7.7)
NEUTROPHILS # BLD AUTO: 2.7 X10(3) UL (ref 1.5–7.7)
NEUTROPHILS NFR BLD AUTO: 40.3 %
OSMOLALITY SERPL CALC.SUM OF ELEC: 272 MOSM/KG (ref 275–295)
PLATELET # BLD AUTO: 40 10(3)UL (ref 150–450)
POTASSIUM SERPL-SCNC: 3.5 MMOL/L (ref 3.5–5.1)
RBC # BLD AUTO: 4.63 X10(6)UL (ref 4.3–5.7)
SODIUM SERPL-SCNC: 126 MMOL/L (ref 136–145)
WBC # BLD AUTO: 6.7 X10(3) UL (ref 4–11)

## 2019-09-05 PROCEDURE — 80053 COMPREHEN METABOLIC PANEL: CPT

## 2019-09-05 PROCEDURE — 96360 HYDRATION IV INFUSION INIT: CPT

## 2019-09-05 PROCEDURE — 99254 IP/OBS CNSLTJ NEW/EST MOD 60: CPT | Performed by: INTERNAL MEDICINE

## 2019-09-05 PROCEDURE — 99215 OFFICE O/P EST HI 40 MIN: CPT | Performed by: NURSE PRACTITIONER

## 2019-09-05 PROCEDURE — 99223 1ST HOSP IP/OBS HIGH 75: CPT | Performed by: HOSPITALIST

## 2019-09-05 PROCEDURE — 85025 COMPLETE CBC W/AUTO DIFF WBC: CPT

## 2019-09-05 PROCEDURE — 83615 LACTATE (LD) (LDH) ENZYME: CPT

## 2019-09-05 RX ORDER — DIAZEPAM 5 MG/1
5 TABLET ORAL EVERY 6 HOURS PRN
Status: DISCONTINUED | OUTPATIENT
Start: 2019-09-05 | End: 2019-09-07

## 2019-09-05 RX ORDER — OXYCODONE HYDROCHLORIDE 5 MG/1
5 TABLET ORAL EVERY 4 HOURS PRN
Status: DISCONTINUED | OUTPATIENT
Start: 2019-09-05 | End: 2019-09-07

## 2019-09-05 RX ORDER — METOCLOPRAMIDE HYDROCHLORIDE 5 MG/ML
10 INJECTION INTRAMUSCULAR; INTRAVENOUS EVERY 8 HOURS PRN
Status: DISCONTINUED | OUTPATIENT
Start: 2019-09-05 | End: 2019-09-07

## 2019-09-05 RX ORDER — TRAZODONE HYDROCHLORIDE 50 MG/1
50 TABLET ORAL NIGHTLY PRN
Status: DISCONTINUED | OUTPATIENT
Start: 2019-09-05 | End: 2019-09-07

## 2019-09-05 RX ORDER — ONDANSETRON 2 MG/ML
4 INJECTION INTRAMUSCULAR; INTRAVENOUS EVERY 6 HOURS PRN
Status: DISCONTINUED | OUTPATIENT
Start: 2019-09-05 | End: 2019-09-07

## 2019-09-05 RX ORDER — DEXTROSE MONOHYDRATE 25 G/50ML
50 INJECTION, SOLUTION INTRAVENOUS
Status: DISCONTINUED | OUTPATIENT
Start: 2019-09-05 | End: 2019-09-07

## 2019-09-05 RX ORDER — FINASTERIDE 5 MG/1
5 TABLET, FILM COATED ORAL DAILY
Status: DISCONTINUED | OUTPATIENT
Start: 2019-09-06 | End: 2019-09-07

## 2019-09-05 RX ORDER — OXYCODONE HYDROCHLORIDE 5 MG/1
10 TABLET ORAL EVERY 4 HOURS PRN
Status: DISCONTINUED | OUTPATIENT
Start: 2019-09-05 | End: 2019-09-07

## 2019-09-05 RX ORDER — ALBUTEROL SULFATE 90 UG/1
1 AEROSOL, METERED RESPIRATORY (INHALATION) EVERY 4 HOURS PRN
Status: DISCONTINUED | OUTPATIENT
Start: 2019-09-05 | End: 2019-09-07

## 2019-09-05 RX ORDER — SODIUM CHLORIDE 9 MG/ML
INJECTION, SOLUTION INTRAVENOUS CONTINUOUS
Status: DISCONTINUED | OUTPATIENT
Start: 2019-09-05 | End: 2019-09-07

## 2019-09-05 RX ORDER — OXYCODONE HYDROCHLORIDE 5 MG/1
TABLET ORAL EVERY 4 HOURS PRN
Status: DISCONTINUED | OUTPATIENT
Start: 2019-09-05 | End: 2019-09-05 | Stop reason: SDUPTHER

## 2019-09-05 NOTE — PROGRESS NOTES
Education Record    Learner:  Patient    Disease / Diagnosis:IV Hydration     Barriers / Limitations:  None   Comments:    Method:  Brief focused   Comments:    General Topics:  Infection, Medication, Pain, Precautions, Procedure, Side effects and symptom

## 2019-09-05 NOTE — PROGRESS NOTES
ANP Visit Note    Patient Name: Patricia Guevara   YOB: 1965   Medical Record Number: [id number]   CSN: [id number]   Date of visit: 9/5/2019       Chief Complaint/Reason for Visit:  Patient presents with:   Follow - Up: apn assessment       History Hepatosplenomegaly     Type 2 diabetes mellitus without complication, without long-term current use of insulin [facility])       Medical History:  Past Medical History:   Diagnosis Date   • Cancer ([facility] [address] 2013    CLL-monitored by Dr. Jyoti Soto   • Essential hyperten Cigarettes        Quit date: 2001        Years since quittin.3      Smokeless tobacco: Never Used      Tobacco comment: currently 1 cigar daily - does not inhale    Substance and Sexual Activity      Alcohol use: Yes        Frequency: 2-3 times a tablet, Rfl: 5  •  metFORMIN HCl 850 MG Oral Tab, TAKE 1 TABLET BY MOUTH TWO TIMES A DAY, Disp: 60 tablet, Rfl: 5  •  BENAZEPRIL HCL 10 MG Oral Tab, TAKE 1 TABLET BY MOUTH ONE TIME A DAY , Disp: 30 tablet, Rfl: 5  •  ATORVASTATIN 40 MG Oral Tab, TAKE 1 TAB Signs: There were no vitals taken for this visit. HEENT: EOMs intact. PERRL. Posterior oropharynx with erythema/petechiae. Oral thrush   Chest: Clear to auscultation. Heart: Regular rate and rhythm. Abdomen: Soft, non tender with bowel sounds.   Extremi x10(3) uL    Eosinophil Absolute 0.00 0.00 - 0.70 x10(3) uL    Basophil Absolute 0.03 0.00 - 0.20 x10(3) uL    Immature Granulocyte Absolute 0.02 0.00 - 1.00 x10(3) uL    Neutrophil % 40.3 %    Lymphocyte % 55.3 %    Monocyte % 3.7 %    Eosinophil % 0.0 % problems. Planned Follow Up: Discussed with Dr. Satnam López who saw and examined patient. Will direct admit to [facility] for further workup. Discussed with Dr. Susy Beverly who accepts admission. Discussed patient condition and plan of care with Cliff Laguerre RN.

## 2019-09-05 NOTE — PLAN OF CARE
Admitted from the [facility] with chief complaints of feeling weak and sick , who elevated liver profile, alert and oriented, no distress, experiencing generalized pain including mouth sores.  Afebrile, abdomen is soft and non distended, no appetite, den

## 2019-09-05 NOTE — CONSULTS
Hematology-Oncology Consultation Note    Patient Name: Turner Valdez   YOB: 1965   Medical Record Number: [id number]   CSN: [id number]   Consulting Physician: Wendel Brunner, MD  Date of Consultation: 9/5/2019     Reason for Consultation:  Verna Xiong 5/27/2016    Performed by Rachel Ceron MD at [facility] MAIN OR   • COLONOSCOPY     • OTHER  05/27/2016    cervical lad       Family Medical History:  Family History   Problem Relation Age of Onset   • Other (CHF) Maternal Grandmother    • Other (Acute Myocar Physically abused: Not on file        Forced sexual activity: Not on file    Other Topics      Concerns:         Service: Not Asked        Blood Transfusions: Not Asked        Caffeine Concern: Yes          32-40 oz per day/coffee/tea        Occu BENAZEPRIL HCL 10 MG Oral Tab TAKE 1 TABLET BY MOUTH ONE TIME A DAY  Disp: 30 tablet Rfl: 5   ATORVASTATIN 40 MG Oral Tab TAKE 1 TABLET BY MOUTH ONE TIME A DAY  Disp: 30 tablet Rfl: 5   Albuterol Sulfate HFA (PROAIR HFA) 108 (90 Base) MCG/ACT Inhalation Soft, non tender with good bowel sounds. Extremities: Pedal pulses are present. No edema. Neurological: Grossly intact. Lymphatics: There is no palpable lymphadenopathy  Psych/Depression: Mood and affect are appropriate.     Laboratory:  Lab Component Mean Corpuscular HGB Conc 34.1 08/04/2014 1537    Mean Corpuscular HGB Conc 33.1 03/22/2014 0805    Mean Corpuscular HGB Conc 34.0 02/03/2014 1536    Mean Corpuscular HGB Conc 34.0 02/03/2014 1536    RDW 12.4 09/05/2019 1138    RDW 12.2 09/01/2019 1010 BUN 17 08/04/2014 1537    BUN 11 03/22/2014 0805    BUN 13 02/03/2014 1536    UREA NITROGEN (BUN) 13 06/29/2013 0810    UREA NITROGEN (BUN) 10 03/02/2012 0830    UREA NITROGEN (BUN) 14 05/14/2011 0812    CREATININE 1.03 08/04/2014 1537    CREATININE 0.53 ( 09/05/2019 1138    Sodium 133 (L) 09/01/2019 1010    Sodium 137 06/06/2019 1430    SODIUM 141 08/04/2014 1537    SODIUM 142 03/22/2014 0805    SODIUM 140 02/03/2014 1536    SODIUM 140 06/29/2013 0810    SODIUM 139 03/02/2012 0830    SODIUM 139 05/14/2011 0 ALT 32 03/02/2012 0830    ALT 35 05/14/2011 0812    BILIRUBIN, TOTAL 0.3 08/04/2014 1537    BILIRUBIN, TOTAL 0.4 03/22/2014 0805    BILIRUBIN, TOTAL 0.3 02/03/2014 1536    BILIRUBIN, TOTAL 0.7 06/29/2013 0810    BILIRUBIN, TOTAL 0.6 03/02/2012 0830    BILI LAT CHEST (CPT=71046)     INDICATIONS:  Headache, bodyache, chills. COMPARISON:  None. TECHNIQUE:  PA and lateral chest radiographs were obtained.      PATIENT STATED HISTORY: (As transcribed by Technologist)  Patient said he has body aches and kidn infection/liver issues. Thank you very much for including us in the care of this patient. We will follow along with you.        MD Eri Lyle [facility]

## 2019-09-05 NOTE — PROGRESS NOTES
NURSING ADMISSION NOTE      Patient admitted via wheelchair. Oriented to room. Safety precautions initiated. Bed in low position. Call light in reach.   Pt arrived in stable condition, Ax4, vss, c/o pain 7/10 in his lower back and neck, physician ma

## 2019-09-05 NOTE — PROGRESS NOTES
Patient presents with: Follow - Up: apn assessment    ER follow to diarrhea and dehydration.  Patient c/o continued body ache along with head, and eye aches chills and fatique no one else sick at home symptoms started on 9/1 patient hasn't been able to eat

## 2019-09-05 NOTE — H&P
[facility] HOSPITALIST  History and Physical     Blaketracy Lowry Patient Status:  Inpatient    1965 MRN [id number]   [facility] 4NW-A Attending Chelsie Hinojosa MD   Hosp Day # 0 PCP Santi Hooker MD     Chief Complaint: chills    History of Comment:nausea  Dust                    SHORTNESS OF BREATH  Mold                    SHORTNESS OF BREATH  Pollen                  SHORTNESS OF BREATH    Medications:    No current facility-administered medications on file prior to encounter.    Current Outp ALLERGY) 180 MG Oral Tab Take 1 tablet by mouth once daily. Disp: 90 tablet Rfl: 1   Multiple Vitamins-Minerals (CENTRUM SILVER ULTRA MENS) Oral Tab Take  by mouth.  Disp:  Rfl:        Review of Systems:   A comprehensive 14 point review of systems was comp nature; Check CMV,EBC(mono),acute hepatitis panel/hep c;  Defer imaging to oncology  2. CLL; hold ibrutinib per conversation with Saadia Thomas  3. Thrush-topical swish and spit nystatin  4. DMII-check a1c; insulin ordered-monitor sugars; hold orals  5.

## 2019-09-06 ENCOUNTER — APPOINTMENT (OUTPATIENT)
Dept: CT IMAGING | Facility: HOSPITAL | Age: 54
DRG: 841 | End: 2019-09-06
Attending: INTERNAL MEDICINE
Payer: COMMERCIAL

## 2019-09-06 ENCOUNTER — TELEPHONE (OUTPATIENT)
Dept: HEMATOLOGY/ONCOLOGY | Facility: HOSPITAL | Age: 54
End: 2019-09-06

## 2019-09-06 LAB
ALBUMIN SERPL-MCNC: 2.5 G/DL (ref 3.4–5)
ALBUMIN/GLOB SERPL: 1 {RATIO} (ref 1–2)
ALP LIVER SERPL-CCNC: 75 U/L (ref 45–117)
ALT SERPL-CCNC: 304 U/L (ref 16–61)
ANION GAP SERPL CALC-SCNC: 8 MMOL/L (ref 0–18)
AST SERPL-CCNC: 272 U/L (ref 15–37)
BASOPHILS # BLD AUTO: 0.03 X10(3) UL (ref 0–0.2)
BASOPHILS NFR BLD AUTO: 0.4 %
BILIRUB SERPL-MCNC: 0.7 MG/DL (ref 0.1–2)
BUN BLD-MCNC: 12 MG/DL (ref 7–18)
BUN/CREAT SERPL: 12.9 (ref 10–20)
CALCIUM BLD-MCNC: 7.4 MG/DL (ref 8.5–10.1)
CHLORIDE SERPL-SCNC: 98 MMOL/L (ref 98–112)
CO2 SERPL-SCNC: 25 MMOL/L (ref 21–32)
CREAT BLD-MCNC: 0.93 MG/DL (ref 0.7–1.3)
DEPRECATED RDW RBC AUTO: 37.6 FL (ref 35.1–46.3)
EOSINOPHIL # BLD AUTO: 0.01 X10(3) UL (ref 0–0.7)
EOSINOPHIL NFR BLD AUTO: 0.1 %
ERYTHROCYTE [DISTWIDTH] IN BLOOD BY AUTOMATED COUNT: 12.4 % (ref 11–15)
GLOBULIN PLAS-MCNC: 2.4 G/DL (ref 2.8–4.4)
GLUCOSE BLD-MCNC: 151 MG/DL (ref 70–99)
GLUCOSE BLD-MCNC: 188 MG/DL (ref 70–99)
GLUCOSE BLD-MCNC: 190 MG/DL (ref 70–99)
GLUCOSE BLD-MCNC: 222 MG/DL (ref 70–99)
GLUCOSE BLD-MCNC: 231 MG/DL (ref 70–99)
HAV IGM SER QL: 2.2 MG/DL (ref 1.6–2.6)
HAV IGM SER QL: NONREACTIVE
HBV CORE IGM SER QL: NONREACTIVE
HBV SURFACE AG SERPL QL IA: NONREACTIVE
HCT VFR BLD AUTO: 30.9 % (ref 39–53)
HCV AB SERPL QL IA: NONREACTIVE
HGB BLD-MCNC: 10.8 G/DL (ref 13–17.5)
IMM GRANULOCYTES # BLD AUTO: 0.03 X10(3) UL (ref 0–1)
IMM GRANULOCYTES NFR BLD: 0.4 %
LDH SERPL L TO P-CCNC: 666 U/L (ref 87–241)
LYMPHOCYTES # BLD AUTO: 3.34 X10(3) UL (ref 1–4)
LYMPHOCYTES NFR BLD AUTO: 49.6 %
M PROTEIN MFR SERPL ELPH: 4.9 G/DL (ref 6.4–8.2)
MCH RBC QN AUTO: 29 PG (ref 26–34)
MCHC RBC AUTO-ENTMCNC: 35 G/DL (ref 31–37)
MCV RBC AUTO: 83.1 FL (ref 80–100)
MONOCYTES # BLD AUTO: 0.53 X10(3) UL (ref 0.1–1)
MONOCYTES NFR BLD AUTO: 7.9 %
NEUTROPHILS # BLD AUTO: 2.8 X10 (3) UL (ref 1.5–7.7)
NEUTROPHILS # BLD AUTO: 2.8 X10(3) UL (ref 1.5–7.7)
NEUTROPHILS NFR BLD AUTO: 41.6 %
OSMOLALITY SERPL CALC.SUM OF ELEC: 275 MOSM/KG (ref 275–295)
PLATELET # BLD AUTO: 45 10(3)UL (ref 150–450)
POTASSIUM SERPL-SCNC: 3.5 MMOL/L (ref 3.5–5.1)
RBC # BLD AUTO: 3.72 X10(6)UL (ref 4.3–5.7)
SODIUM SERPL-SCNC: 131 MMOL/L (ref 136–145)
WBC # BLD AUTO: 6.7 X10(3) UL (ref 4–11)

## 2019-09-06 PROCEDURE — 99232 SBSQ HOSP IP/OBS MODERATE 35: CPT | Performed by: HOSPITALIST

## 2019-09-06 PROCEDURE — 99232 SBSQ HOSP IP/OBS MODERATE 35: CPT | Performed by: INTERNAL MEDICINE

## 2019-09-06 PROCEDURE — 74177 CT ABD & PELVIS W/CONTRAST: CPT | Performed by: INTERNAL MEDICINE

## 2019-09-06 PROCEDURE — 71260 CT THORAX DX C+: CPT | Performed by: INTERNAL MEDICINE

## 2019-09-06 RX ORDER — SALIVA STIMULANT COMB. NO.3
SPRAY, NON-AEROSOL (ML) MUCOUS MEMBRANE 4 TIMES DAILY
Status: DISCONTINUED | OUTPATIENT
Start: 2019-09-06 | End: 2019-09-07

## 2019-09-06 RX ORDER — POTASSIUM CHLORIDE 20 MEQ/1
40 TABLET, EXTENDED RELEASE ORAL EVERY 4 HOURS
Status: DISPENSED | OUTPATIENT
Start: 2019-09-06 | End: 2019-09-06

## 2019-09-06 NOTE — PHYSICAL THERAPY NOTE
PHYSICAL THERAPY QUICK EVALUATION - INPATIENT    Room Number: 406/406-A  Evaluation Date: 9/6/2019  Presenting Problem: weakness  Physician Order: PT Eval and Treat    Problem List  Principal Problem:    Hyponatremia  Active Problems:    Transaminitis currently have. ..  -   Turning over in bed (including adjusting bedclothes, sheets and blankets)?: None   -   Sitting down on and standing up from a chair with arms (e.g., wheelchair, bedside commode, etc.): None   -   Moving from lying on back to sitting aide will perform treatment activities prescribed by this physical therapist. The rehab aide will communicate with overseeing PT regarding any change in functional mobility. RN aware. GOALS  Patient was able to achieve the following goals . ..     Divya

## 2019-09-06 NOTE — PLAN OF CARE
Alert, still feeling weak and lousy but a lot better compared to yesterday. Counts are better, liver enzymes were elevated, poor appetite, tolerating liquids, no diarrhea noted, has had an episode of nausea, with good relief from Zofran.  Had CT of the abdo

## 2019-09-06 NOTE — PROGRESS NOTES
[facility]  Progress note     Scott Lowry Patient Status:  Inpatient    1965 MRN [id number]   [facility] 4NW-A Attending Chelsie Hinojosa MD   Hosp Day # 1 PCP Santi Hooker MD     Chief Complaint: chills  Subjective: patient 1. Tactile fevers/chills/sore throat/muscle aches; Sounds viral in nature; Workup so far negative; monitor labs  2. CLL; hold ibrutinib per heme/onc;  Splenomegaly more likely infectious instead of CLL  3. Thrush-topical swish and spit nystatin  4.  D

## 2019-09-06 NOTE — PROGRESS NOTES
Heme/Onc Progress Note    Patient Name: Aixa Delgado   YOB: 1965   Medical Record Number: [id number]   CSN: [id number]   Attending Physician: Dominga Boyle M.D. Subjective:  Feels a little better.  Just had CT done    Objective:    Vital 50 mg Oral Nightly PRN   glucose (DEX4) oral liquid 15 g 15 g Oral Q15 Min PRN   Or      Glucose-Vitamin C (DEX-4) chewable tab 4 tablet 4 tablet Oral Q15 Min PRN   Or      dextrose 50 % injection 50 mL 50 mL Intravenous Q15 Min PRN   Or      glucose (DEX4 Data Registry) which   includes the Dose Index Registry. PATIENT STATED HISTORY:(As transcribed by Technologist)  Inpatient. Patient presents with fever, bodyache and back pain.   Hx CLL      CONTRAST USED:  100cc of Omnipaque 350     FINDINGS:    CHES by the time of the study. No sign of dilation of the small bowel. No ascites, free   air, colitis, diverticulitis or excessive stool. Appendix is visualized, normal.        ABDOMINAL WALL:  Unremarkable. URINARY BLADDER:  Unremarkable.       PELVIC NO especially opportunistic infections. EBV shows no evidence of acute disease. Hepatitis panel negative. Awaiting rest of titer. IgG low c/w hypogammaglobulinemia. Will treat with IVIG.      3. Hyponatremia- likely from poor PO intake.  Improved with IVF and

## 2019-09-06 NOTE — PAYOR COMM NOTE
--------------  ADMISSION REVIEW     Payor: [address]PO  Subscriber #:  [id number]  Authorization Number: [id number]    Admit date: 9/5/19  Admit time: 56       Admitting Physician: Rosio Lackey MD  Attending Physician:  Rosio Lackey MD • CERVICAL LYMPH NODE BX Right 5/27/2016    Performed by Lilliam Rios MD at [facility] MAIN OR   • COLONOSCOPY     • OTHER  05/27/2016    cervical lad       Social History:  reports that he has been smoking cigarettes.  He has a 20.00 pack-year smoking history Ibrutinib 420 MG Oral Tab Take 1 tablet (420 mg total) by mouth every morning. Disp: 30 tablet Rfl: 11   Prochlorperazine Maleate (COMPAZINE) 10 mg tablet Take 1 tablet (10 mg total) by mouth every 6 (six) hours as needed for Nausea.  Disp: 30 tablet Rfl: 3 Lab 09/01/19  1010 09/05/19  1138   WBC 8.1 6.7   HGB 15.0 13.4   MCV 88.5 84.4   PLT 92.0* 40.0*       Recent Labs   Lab 09/01/19  1010 09/05/19  1138   * 268*   BUN 8 14   CREATSERUM 1.19 1.14   GFRAA 80 84   GFRNAA 69 73   CA 9.4 8.1*   ALB 4.1 3 Date of Consultation: 9/5/2019        Reason for Consultation:  Dang Branch was seen today for the diagnosis of CLL        History of Present Illness:   48 y/o gentleman with a h/o CLL on Ibrutinib since 11/2018 with response.  Had been doing well with cli 1. CLL- had been on Ibrutinib since 11/2018. Had been tolerating this well and responding. Don't think current acute process is from progression or transformation of his CLL but of course cannot completely rule out.  White count normal w/o lymphocytosis and Pertinent positives and negatives noted in the subjective     Physical Exam:    /77 (BP Location: Right arm)   Pulse 93   Temp 98.8 °F (37.1 °C) (Oral)   Resp 18   Wt 212 lb (96.2 kg)   SpO2 98%   BMI 27.21 kg/m²   General: No acute distress.  Alert a 5. Hyponatremia-2/2 dehydration. Improving on fluids  6. LFTs elevated-viral testing negative; monitor; gi consutl today  7. Thrombocytopenia-unclear if his ibrutinib is culprit.   Might also be viral in nature/splenomegaly.     Quality:  · DVT Prophylaxis: 9/6/2019 1030 Given 4 mg Intravenous Dianelys Vega RN      oxyCODONE HCl (OXY-IR) cap/tab 5 mg     Date Action Dose Route User    9/5/2019 1552 Given 5 mg Oral Keely Morris RN      oxyCODONE HCl (OXY-IR) cap/tab 10 mg     Date Action Dose Route User

## 2019-09-06 NOTE — DIETARY NOTE
[address]     Admitting diagnosis:  elevated LFT's, hyponatremia  CLL  Hyponatremia    Ht:  6'2\"   Wt: 110 kg (243 lb). This is 128% of IBW  Body mass index is 31 kg/m².   IBW: 86 kg     Wt History: Unremarkable

## 2019-09-07 VITALS
OXYGEN SATURATION: 95 % | BODY MASS INDEX: 27 KG/M2 | TEMPERATURE: 98 F | DIASTOLIC BLOOD PRESSURE: 66 MMHG | HEART RATE: 90 BPM | SYSTOLIC BLOOD PRESSURE: 139 MMHG | WEIGHT: 212 LBS | RESPIRATION RATE: 18 BRPM

## 2019-09-07 LAB
ALBUMIN SERPL-MCNC: 2.4 G/DL (ref 3.4–5)
ALP LIVER SERPL-CCNC: 78 U/L (ref 45–117)
ALT SERPL-CCNC: 263 U/L (ref 16–61)
ANION GAP SERPL CALC-SCNC: 8 MMOL/L (ref 0–18)
AST SERPL-CCNC: 217 U/L (ref 15–37)
BASOPHILS # BLD: 0 X10(3) UL (ref 0–0.2)
BASOPHILS NFR BLD: 0 %
BILIRUB DIRECT SERPL-MCNC: 0.3 MG/DL (ref 0–0.2)
BILIRUB SERPL-MCNC: 0.8 MG/DL (ref 0.1–2)
BUN BLD-MCNC: 9 MG/DL (ref 7–18)
BUN/CREAT SERPL: 11 (ref 10–20)
CALCIUM BLD-MCNC: 7.8 MG/DL (ref 8.5–10.1)
CHLORIDE SERPL-SCNC: 98 MMOL/L (ref 98–112)
CO2 SERPL-SCNC: 25 MMOL/L (ref 21–32)
CREAT BLD-MCNC: 0.82 MG/DL (ref 0.7–1.3)
DEPRECATED RDW RBC AUTO: 38.7 FL (ref 35.1–46.3)
EOSINOPHIL # BLD: 0 X10(3) UL (ref 0–0.7)
EOSINOPHIL NFR BLD: 0 %
ERYTHROCYTE [DISTWIDTH] IN BLOOD BY AUTOMATED COUNT: 12.6 % (ref 11–15)
GLUCOSE BLD-MCNC: 188 MG/DL (ref 70–99)
GLUCOSE BLD-MCNC: 207 MG/DL (ref 70–99)
HCT VFR BLD AUTO: 30.5 % (ref 39–53)
HGB BLD-MCNC: 10.3 G/DL (ref 13–17.5)
LDH SERPL L TO P-CCNC: 552 U/L (ref 87–241)
LYMPHOCYTES NFR BLD: 2.42 X10(3) UL (ref 1–4)
LYMPHOCYTES NFR BLD: 31 %
M PROTEIN MFR SERPL ELPH: 5.3 G/DL (ref 6.4–8.2)
MCH RBC QN AUTO: 28.8 PG (ref 26–34)
MCHC RBC AUTO-ENTMCNC: 33.8 G/DL (ref 31–37)
MCV RBC AUTO: 85.2 FL (ref 80–100)
MONOCYTES # BLD: 0.47 X10(3) UL (ref 0.1–1)
MONOCYTES NFR BLD: 6 %
MORPHOLOGY: NORMAL
NEUTROPHILS # BLD AUTO: 3.39 X10 (3) UL (ref 1.5–7.7)
NEUTROPHILS NFR BLD: 60 %
NEUTS BAND NFR BLD: 3 %
NEUTS HYPERSEG # BLD: 4.91 X10(3) UL (ref 1.5–7.7)
OSMOLALITY SERPL CALC.SUM OF ELEC: 276 MOSM/KG (ref 275–295)
PLATELET # BLD AUTO: 58 10(3)UL (ref 150–450)
PLATELET MORPHOLOGY: NORMAL
POTASSIUM SERPL-SCNC: 3.8 MMOL/L (ref 3.5–5.1)
POTASSIUM SERPL-SCNC: 3.8 MMOL/L (ref 3.5–5.1)
RBC # BLD AUTO: 3.58 X10(6)UL (ref 4.3–5.7)
SODIUM SERPL-SCNC: 131 MMOL/L (ref 136–145)
TOTAL CELLS COUNTED: 100
WBC # BLD AUTO: 7.8 X10(3) UL (ref 4–11)

## 2019-09-07 PROCEDURE — 99233 SBSQ HOSP IP/OBS HIGH 50: CPT | Performed by: SPECIALIST

## 2019-09-07 PROCEDURE — 99238 HOSP IP/OBS DSCHRG MGMT 30/<: CPT | Performed by: HOSPITALIST

## 2019-09-07 RX ORDER — BENAZEPRIL HYDROCHLORIDE 10 MG/1
10 TABLET ORAL DAILY
Qty: 1 TABLET | Refills: 0 | Status: SHIPPED
Start: 2019-09-07 | End: 2019-10-10

## 2019-09-07 NOTE — PLAN OF CARE
No complaints overnight. Vitals stable. IVF infusing. Denies any pain. Novolog coverage provided for blood glucose of 188 this evening. Denies any nausea/vomiting. Up as tolerated.       Problem: GASTROINTESTINAL - ADULT  Goal: Minimal or absence of na

## 2019-09-07 NOTE — PROGRESS NOTES
Patient seen and examined. Await heme/onc recs. Possible d/c later today. ?    Rosy Jon MD  [facility]  Internal Medicine Hospitalist  Cell [phone]

## 2019-09-07 NOTE — PROGRESS NOTES
Heme/Onc Progress Note    Patient Name: Luz Kelley   YOB: 1965   Medical Record Number: [id number]     Subjective:  Feels better today. No diarrhea. No new respiratory complaints.      Objective:    Vitals:  /66 (BP Location: Right arm Manual 60 %    Band % 3 %    Lymphocyte % Manual 31 %    Monocyte % Manual 6 %    Eosinophil % Manual 0 %    Basophil % Manual 0 %    Total Cells Counted 100     RBC Morphology Normal Normal, Slide reviewed, see previous RBC morphology.     Platelet Morphol g Oral Q15 Min PRN   Or      Glucose-Vitamin C (DEX-4) chewable tab 8 tablet 8 tablet Oral Q15 Min PRN   ondansetron HCl (ZOFRAN) injection 4 mg 4 mg Intravenous Q6H PRN   Metoclopramide HCl (REGLAN) injection 10 mg 10 mg Intravenous Q8H PRN   Insulin Aspa infection/liver issues. Improving.      OK for discharge from hematology point of view if electrolytes, including sodium, improved today. Patient has f/u with Dr. Caleb Collado next week. Electronically signed by:    Mara Keller M.D.   [facility] Hematology

## 2019-09-08 LAB
HSV TYPE 1/2 COMBINED AB, IGG: 3.68 IV
HSV TYPE 1/2 COMBINED ABS, IGM: 0.13 IV
TOXOPLASMA GONDII AB, IGG: <3 IU/ML
TOXOPLASMA GONDII AB, IGM: <3 AU/ML

## 2019-09-08 NOTE — DISCHARGE SUMMARY
[facility] HOSPITALIST  DISCHARGE SUMMARY     Ruslan Son Patient Status:  Inpatient    1965 MRN [id number]   [facility] 4NW-A Attending No att. providers found   Hosp Day # 2 PCP Yamil Vu MD     Date of Admission: 2019  Date of pcr  Consultants:  • Heme/onc    Discharge Medication List:     Discharge Medications      START taking these medications      Instructions Prescription details   nystatin [id number] UNIT/ML Susp  Commonly known as:  MYCOSTATIN      Take 5 mL (500,000 Units to taking on:  9/8/2019  Quantity:  10 tablet  Refills:  0     Prochlorperazine Maleate 10 mg tablet  Commonly known as:  COMPAZINE      Take 1 tablet (10 mg total) by mouth every 6 (six) hours as needed for Nausea.    Quantity:  30 tablet  Refills:  3     Sari bilaterally. No wheezes. No rhonchi. Cardiovascular: S1, S2. Regular rate and rhythm. No murmurs, rubs or gallops. Abdomen: Soft, nontender mostly, nondistended. No rebound, guarding or organomegaly. Neurologic: No focal neurological deficits.  CNII-XI

## 2019-09-09 ENCOUNTER — TELEPHONE (OUTPATIENT)
Dept: HEMATOLOGY/ONCOLOGY | Facility: HOSPITAL | Age: 54
End: 2019-09-09

## 2019-09-09 DIAGNOSIS — C91.10 CLL (CHRONIC LYMPHOCYTIC LEUKEMIA) (HCC): Primary | ICD-10-CM

## 2019-09-09 LAB
CMV QUANT BY PCR, CPY/ML: <390 CPY/ML
CMV QUANT BY PCR, INTERP: NOT DETECTED
CMV QUANT BY PCR, IU/ML: <227 IU/ML
CMV QUANT BY PCR, LOG CPY/ML: <2.6 LOG CPY/ML
CMV QUANT BY PCR, LOG IU/ML: <2.4 LOG IU/ML

## 2019-09-09 NOTE — PAYOR COMM NOTE
--------------  DISCHARGE REVIEW    Payor: [facility]  Subscriber #:  [id number]  Authorization Number: [id number]    Admit date: 9/5/19  Admit time:  3788  Discharge Date: 9/7/2019 11:35 AM     Admitting Physician: Aldair Dalton MD  Attendi course. Also symptomatically felt much better with fluids and pain meds. LFTs,sodium, and plts improved during course, though not normalized yet. Will have close f/u with heme/onc in clinic.   Infectious workup so far not explanative of symptoms, though ALLEGRA      Take 1 tablet by mouth once daily.    Quantity:  90 tablet  Refills:  1     finasteride 5 MG Tabs  Commonly known as:  PROSCAR      TAKE 1 TABLET BY MOUTH ONE TIME A DAY   Quantity:  30 tablet  Refills:  5     glimepiride 2 MG Tabs  Commonly kn UP-HEM/ONC [8331] 15 min. [address] [facility] in Keven Christian MD    Patient Instructions:          Location Instructions: Your appointment is on the [address] [place] [address] in&nbsp; the [facility].  The address is [address] [place] [address]

## 2019-09-13 ENCOUNTER — OFFICE VISIT (OUTPATIENT)
Dept: HEMATOLOGY/ONCOLOGY | Facility: HOSPITAL | Age: 54
End: 2019-09-13
Attending: INTERNAL MEDICINE
Payer: COMMERCIAL

## 2019-09-13 VITALS
RESPIRATION RATE: 16 BRPM | HEART RATE: 95 BPM | SYSTOLIC BLOOD PRESSURE: 105 MMHG | DIASTOLIC BLOOD PRESSURE: 71 MMHG | BODY MASS INDEX: 30.14 KG/M2 | OXYGEN SATURATION: 99 % | HEIGHT: 74.02 IN | TEMPERATURE: 97 F | WEIGHT: 234.81 LBS

## 2019-09-13 DIAGNOSIS — R19.7 DIARRHEA, UNSPECIFIED TYPE: ICD-10-CM

## 2019-09-13 DIAGNOSIS — B37.0 ORAL CANDIDIASIS: ICD-10-CM

## 2019-09-13 DIAGNOSIS — D69.6 THROMBOCYTOPENIA (HCC): ICD-10-CM

## 2019-09-13 DIAGNOSIS — R74.01 TRANSAMINITIS: ICD-10-CM

## 2019-09-13 DIAGNOSIS — R52 GENERALIZED BODY ACHES: ICD-10-CM

## 2019-09-13 DIAGNOSIS — E87.1 HYPONATREMIA: ICD-10-CM

## 2019-09-13 DIAGNOSIS — R16.1 SPLENOMEGALY: ICD-10-CM

## 2019-09-13 DIAGNOSIS — E86.0 DEHYDRATION: ICD-10-CM

## 2019-09-13 DIAGNOSIS — C91.10 CLL (CHRONIC LYMPHOCYTIC LEUKEMIA) (HCC): Primary | ICD-10-CM

## 2019-09-13 PROCEDURE — 99215 OFFICE O/P EST HI 40 MIN: CPT | Performed by: INTERNAL MEDICINE

## 2019-09-13 NOTE — PROGRESS NOTES
Cancer Center Progress Note    Patient Name: Denise Treadwell   YOB: 1965   Medical Record Number: [id number]   CSN: [id number]   Attending Physician: Cheng Cleveland M.D.    Referring Physician: Madhavi Navarro MD      Date of Visit: 9/13/2019 IVIG for hypogammaglobulinemia. CT C/A/P showed splenomegaly with decrease in known nodes. No other acute findings. Has had some malaise and today he points to the middle of his chest which has been sore- just below sternum.  Found to have blood in the ur (PROAIR HFA) 108 (90 Base) MCG/ACT Inhalation Aero Soln, INHALE 2 PUFFS BY MOUTH EVERY SIX HOURS AS NEEDED for wheezing, Disp: 8.5 g, Rfl: 2  •  Multiple Vitamins-Minerals (CENTRUM SILVER ULTRA MENS) Oral Tab, Take  by mouth., Disp: , Rfl:     Past Medical pipes and cigars    Substance and Sexual Activity      Alcohol use:  Yes        Alcohol/week: 3.0 standard drinks        Types: 3 Cans of beer per week        Frequency: 2-3 times a week        Drinks per session: 1 or 2        Comment: 4-6 Beers/week (36.2 °C) (Tympanic)   Resp 16   Ht 1.88 m (6' 2.02\")   Wt 106.5 kg (234 lb 12.8 oz)   SpO2 99%   BMI 30.13 kg/m²         Physical Examination:  General: Patient is alert and oriented x 3, not in acute distress.   Psych:  Mood and affect appropriate  HEENT ALBGLOBRAT 2.7 (H) 06/29/2013     (L) 09/13/2019    K 4.2 09/13/2019    CL 97 (L) 09/13/2019    CO2 27.0 09/13/2019     Lab Component   Component Value Date/Time     (H) 09/13/2019 1332     08/04/2014 1537     Radiology:  PROCEDURE:  CT cm cephalocaudal dimension, previously 13.1 cm. It measures  15.3 cm in AP dimension, previously 13.3 cm. Splenic attenuation is homogeneous. KIDNEYS:  No acute abnormality. ADRENALS:  Unremarkable. AORTA/VASCULAR:  No aortic aneurysm. obstruction, ascites, free air, abscess, colitis, diverticulitis or other acute process. Dictated by: Sigifredo Coleman MD on 9/06/2019 at 9:23       Approved by: Sigifredo Coleman MD             Impression and Plan:  1.  Malaise, body aches, fever, el

## 2019-09-13 NOTE — PATIENT INSTRUCTIONS
For triage nurse: [phone] Monday through Friday 7:30-5:00.  *Please note this is a new phone number*    After hours or weekends for emergent needs:  [phone].      To schedule diagnostic testing: Central Scheduling: [place] [phone]

## 2019-09-16 ENCOUNTER — SOCIAL WORK SERVICES (OUTPATIENT)
Dept: HEMATOLOGY/ONCOLOGY | Facility: HOSPITAL | Age: 54
End: 2019-09-16

## 2019-09-16 NOTE — PROGRESS NOTES
spoke with Patient who requested both FMLA and Short Term Disability Paperwork to be completed; faxed to [email or web address] at O#[phone]

## 2019-09-17 RX ORDER — DIPHENHYDRAMINE HCL 25 MG
25 CAPSULE ORAL ONCE
Status: CANCELLED | OUTPATIENT
Start: 2019-09-17

## 2019-09-17 RX ORDER — ACETAMINOPHEN 325 MG/1
650 TABLET ORAL ONCE
Status: CANCELLED | OUTPATIENT
Start: 2019-10-01

## 2019-09-17 RX ORDER — ACETAMINOPHEN 325 MG/1
650 TABLET ORAL ONCE
Status: CANCELLED | OUTPATIENT
Start: 2019-09-17

## 2019-09-17 RX ORDER — DIPHENHYDRAMINE HCL 25 MG
25 CAPSULE ORAL ONCE
Status: CANCELLED | OUTPATIENT
Start: 2019-10-01

## 2019-09-19 ENCOUNTER — OFFICE VISIT (OUTPATIENT)
Dept: HEMATOLOGY/ONCOLOGY | Facility: HOSPITAL | Age: 54
End: 2019-09-19
Attending: INTERNAL MEDICINE
Payer: COMMERCIAL

## 2019-09-19 VITALS
HEIGHT: 74.02 IN | DIASTOLIC BLOOD PRESSURE: 67 MMHG | OXYGEN SATURATION: 98 % | BODY MASS INDEX: 29.9 KG/M2 | WEIGHT: 233 LBS | TEMPERATURE: 98 F | RESPIRATION RATE: 18 BRPM | SYSTOLIC BLOOD PRESSURE: 119 MMHG | HEART RATE: 101 BPM

## 2019-09-19 DIAGNOSIS — R74.01 TRANSAMINITIS: ICD-10-CM

## 2019-09-19 DIAGNOSIS — R52 GENERALIZED BODY ACHES: ICD-10-CM

## 2019-09-19 DIAGNOSIS — R74.02 ELEVATED LDH: ICD-10-CM

## 2019-09-19 DIAGNOSIS — D69.6 THROMBOCYTOPENIA (HCC): Primary | ICD-10-CM

## 2019-09-19 DIAGNOSIS — C91.10 CLL (CHRONIC LYMPHOCYTIC LEUKEMIA) (HCC): ICD-10-CM

## 2019-09-19 DIAGNOSIS — D80.1 HYPOGAMMAGLOBULINEMIA (HCC): ICD-10-CM

## 2019-09-19 DIAGNOSIS — B37.0 ORAL CANDIDIASIS: ICD-10-CM

## 2019-09-19 DIAGNOSIS — R16.1 SPLENOMEGALY: ICD-10-CM

## 2019-09-19 DIAGNOSIS — E86.0 DEHYDRATION: ICD-10-CM

## 2019-09-19 LAB
ALBUMIN SERPL-MCNC: 3.4 G/DL (ref 3.4–5)
ALBUMIN/GLOB SERPL: 1.1 {RATIO} (ref 1–2)
ALP LIVER SERPL-CCNC: 108 U/L (ref 45–117)
ALT SERPL-CCNC: 65 U/L (ref 16–61)
ANION GAP SERPL CALC-SCNC: 6 MMOL/L (ref 0–18)
AST SERPL-CCNC: 36 U/L (ref 15–37)
BASOPHILS # BLD AUTO: 0.07 X10(3) UL (ref 0–0.2)
BASOPHILS NFR BLD AUTO: 1.5 %
BILIRUB SERPL-MCNC: 0.4 MG/DL (ref 0.1–2)
BUN BLD-MCNC: 18 MG/DL (ref 7–18)
BUN/CREAT SERPL: 19.1 (ref 10–20)
CALCIUM BLD-MCNC: 9.1 MG/DL (ref 8.5–10.1)
CHLORIDE SERPL-SCNC: 103 MMOL/L (ref 98–112)
CO2 SERPL-SCNC: 26 MMOL/L (ref 21–32)
CREAT BLD-MCNC: 0.94 MG/DL (ref 0.7–1.3)
DEPRECATED RDW RBC AUTO: 43.2 FL (ref 35.1–46.3)
EOSINOPHIL # BLD AUTO: 0.06 X10(3) UL (ref 0–0.7)
EOSINOPHIL NFR BLD AUTO: 1.3 %
ERYTHROCYTE [DISTWIDTH] IN BLOOD BY AUTOMATED COUNT: 13.4 % (ref 11–15)
GLOBULIN PLAS-MCNC: 3.1 G/DL (ref 2.8–4.4)
GLUCOSE BLD-MCNC: 148 MG/DL (ref 70–99)
HCT VFR BLD AUTO: 35.3 % (ref 39–53)
HGB BLD-MCNC: 11.5 G/DL (ref 13–17.5)
IMM GRANULOCYTES # BLD AUTO: 0.02 X10(3) UL (ref 0–1)
IMM GRANULOCYTES NFR BLD: 0.4 %
LDH SERPL L TO P-CCNC: 230 U/L (ref 87–241)
LYMPHOCYTES # BLD AUTO: 2.05 X10(3) UL (ref 1–4)
LYMPHOCYTES NFR BLD AUTO: 43.3 %
M PROTEIN MFR SERPL ELPH: 6.5 G/DL (ref 6.4–8.2)
MCH RBC QN AUTO: 28.8 PG (ref 26–34)
MCHC RBC AUTO-ENTMCNC: 32.6 G/DL (ref 31–37)
MCV RBC AUTO: 88.3 FL (ref 80–100)
MONOCYTES # BLD AUTO: 0.51 X10(3) UL (ref 0.1–1)
MONOCYTES NFR BLD AUTO: 10.8 %
NEUTROPHILS # BLD AUTO: 2.02 X10 (3) UL (ref 1.5–7.7)
NEUTROPHILS # BLD AUTO: 2.02 X10(3) UL (ref 1.5–7.7)
NEUTROPHILS NFR BLD AUTO: 42.7 %
OSMOLALITY SERPL CALC.SUM OF ELEC: 285 MOSM/KG (ref 275–295)
PLATELET # BLD AUTO: 158 10(3)UL (ref 150–450)
POTASSIUM SERPL-SCNC: 4.2 MMOL/L (ref 3.5–5.1)
RBC # BLD AUTO: 4 X10(6)UL (ref 4.3–5.7)
SODIUM SERPL-SCNC: 135 MMOL/L (ref 136–145)
WBC # BLD AUTO: 4.7 X10(3) UL (ref 4–11)

## 2019-09-19 PROCEDURE — 99214 OFFICE O/P EST MOD 30 MIN: CPT | Performed by: INTERNAL MEDICINE

## 2019-09-19 NOTE — PATIENT INSTRUCTIONS
For triage nurse: [phone] Monday through Friday 7:30-5:00.  *Please note this is a new phone number*    After hours or weekends for emergent needs:  [phone].      To schedule diagnostic testing: Central Scheduling: [place] [phone]

## 2019-09-20 ENCOUNTER — TELEPHONE (OUTPATIENT)
Dept: HEMATOLOGY/ONCOLOGY | Facility: HOSPITAL | Age: 54
End: 2019-09-20

## 2019-09-20 NOTE — TELEPHONE ENCOUNTER
[facility] called to say they received a 1 one page referral for gammagard. The caller was looking to confirm it is a valid referral and get some additional information in reguards to what was received.

## 2019-09-25 NOTE — PROGRESS NOTES
Cancer Center Progress Note    Patient Name: Denise Treadwell   YOB: 1965   Medical Record Number: [id number]   CSN: [id number]   Attending Physician: Cheng Cleveland M.D.    Referring Physician: Madhavi Navarro MD      Date of Visit: 9/19/2019 splenomegaly with decrease in known nodes. Slowly improved and was discharged home 9/7/19. History of Present Illness:  He continues to slowly improve but still fatigued and not back to [facility].  Back to work fulltime which he feels is contributing to Diagnosis Date   • Cancer [facility]) 2013    CLL-monitored by Dr. Antonia Patiño   • Essential hypertension, benign 11/21/2014    Controlled    • Extrinsic asthma, unspecified    • High cholesterol    • Kidney stone    • Laboratory examination ordered as part of a r Not on file    Lifestyle      Physical activity:        Days per week: Not on file        Minutes per session: Not on file      Stress: Not on file    Relationships      Social connections:        Talks on phone: Not on file        Gets together: Not on fi clear.   Neck: No JVD. Soft small nodes- less  Neck is supple. Lymphatics: No palpable axillary or inguinal nodes   Chest: Clear to auscultation. Heart: Regular rate and rhythm. Abdomen: Soft, non tender with good bowel sounds. No hepatosplenomegaly. BERTHA , CT CHEST+ABDOMEN+PELVIS(ALL CNTRST ONLY)(CPT=71260/85375), 6/24/2016, 7:40. STEVE, CT ABDOMEN+PELVIS(ALL W+WO)(CPT=74178), 5/04/2019, 7:06.      INDICATIONS:  CLL, elevated transaminases, fever     TECHNIQUE:  IV contrast-enhanced scanning th retroperitoneal lymph node has decreased in size, now measuring about 8-9 mm. Other retroperitoneal lymph nodes are sub cm, none showing any increase in size.   There are numerous   mesenteric lymph nodes as well subcentimeter, and stable when measured in supportive care. Not quite back to baseline but continues to improve    2. CLL- had been responding to Ibrutinib with significant clinical improvement. Ibrutinib held given (1). White count normal. Platelets normal and Hb stable.   No evidence of progression

## 2019-09-27 ENCOUNTER — TELEPHONE (OUTPATIENT)
Dept: HEMATOLOGY/ONCOLOGY | Facility: HOSPITAL | Age: 54
End: 2019-09-27

## 2019-09-27 NOTE — TELEPHONE ENCOUNTER
[facility] called about the referral that they had received for gammagard. They had some clinical questions about the order and were looking to get clarification.  Please advise

## 2019-09-30 ENCOUNTER — TELEPHONE (OUTPATIENT)
Dept: HEMATOLOGY/ONCOLOGY | Facility: HOSPITAL | Age: 54
End: 2019-09-30

## 2019-10-01 ENCOUNTER — TELEPHONE (OUTPATIENT)
Dept: HEMATOLOGY/ONCOLOGY | Facility: HOSPITAL | Age: 54
End: 2019-10-01

## 2019-10-01 PROBLEM — D80.1 HYPOGAMMAGLOBULINEMIA (HCC): Status: ACTIVE | Noted: 2019-10-01

## 2019-10-01 RX ORDER — IBRUTINIB 420 MG/1
TABLET, FILM COATED ORAL
Qty: 28 TABLET | Refills: 10 | Status: SHIPPED | OUTPATIENT
Start: 2019-10-01 | End: 2020-07-02

## 2019-10-01 NOTE — TELEPHONE ENCOUNTER
Yesterday we called in Rx to [facility] for Gammagard 20g that has to be shipped for infusion-his plan will not allow us to buy and bill.  Pt learned today that [facility] does not have IVIG!  When I called [facility] I was told that I spoke to one of their new

## 2019-10-02 ENCOUNTER — TELEPHONE (OUTPATIENT)
Dept: HEMATOLOGY/ONCOLOGY | Facility: HOSPITAL | Age: 54
End: 2019-10-02

## 2019-10-04 ENCOUNTER — OFFICE VISIT (OUTPATIENT)
Dept: HEMATOLOGY/ONCOLOGY | Facility: HOSPITAL | Age: 54
End: 2019-10-04
Attending: INTERNAL MEDICINE
Payer: COMMERCIAL

## 2019-10-04 VITALS
HEIGHT: 74.02 IN | SYSTOLIC BLOOD PRESSURE: 121 MMHG | TEMPERATURE: 97 F | WEIGHT: 225.5 LBS | HEART RATE: 112 BPM | DIASTOLIC BLOOD PRESSURE: 69 MMHG | RESPIRATION RATE: 18 BRPM | OXYGEN SATURATION: 98 % | BODY MASS INDEX: 28.94 KG/M2

## 2019-10-04 DIAGNOSIS — C91.10 CLL (CHRONIC LYMPHOCYTIC LEUKEMIA) (HCC): Primary | ICD-10-CM

## 2019-10-04 DIAGNOSIS — R52 GENERALIZED BODY ACHES: ICD-10-CM

## 2019-10-04 DIAGNOSIS — D80.1 HYPOGAMMAGLOBULINEMIA (HCC): ICD-10-CM

## 2019-10-04 DIAGNOSIS — B37.0 ORAL CANDIDIASIS: ICD-10-CM

## 2019-10-04 DIAGNOSIS — R74.01 TRANSAMINITIS: ICD-10-CM

## 2019-10-04 DIAGNOSIS — D69.6 THROMBOCYTOPENIA (HCC): ICD-10-CM

## 2019-10-04 DIAGNOSIS — R16.1 SPLENOMEGALY: ICD-10-CM

## 2019-10-04 PROCEDURE — 99214 OFFICE O/P EST MOD 30 MIN: CPT | Performed by: INTERNAL MEDICINE

## 2019-10-04 PROCEDURE — 96365 THER/PROPH/DIAG IV INF INIT: CPT

## 2019-10-04 PROCEDURE — 96366 THER/PROPH/DIAG IV INF ADDON: CPT

## 2019-10-04 RX ORDER — FLUCONAZOLE 100 MG/1
TABLET ORAL
Qty: 30 TABLET | Refills: 3 | Status: SHIPPED | OUTPATIENT
Start: 2019-10-04 | End: 2020-01-30

## 2019-10-04 RX ORDER — ACYCLOVIR 400 MG/1
400 TABLET ORAL 2 TIMES DAILY
Qty: 60 TABLET | Refills: 3 | Status: SHIPPED | OUTPATIENT
Start: 2019-10-04 | End: 2020-01-30

## 2019-10-04 RX ORDER — ACETAMINOPHEN 325 MG/1
650 TABLET ORAL ONCE
Status: CANCELLED | OUTPATIENT
Start: 2019-11-01

## 2019-10-04 RX ORDER — DIPHENHYDRAMINE HCL 25 MG
25 CAPSULE ORAL ONCE
Status: CANCELLED | OUTPATIENT
Start: 2019-11-01

## 2019-10-04 RX ORDER — DIPHENHYDRAMINE HCL 25 MG
25 CAPSULE ORAL ONCE
Status: COMPLETED | OUTPATIENT
Start: 2019-10-04 | End: 2019-10-04

## 2019-10-04 RX ORDER — ACETAMINOPHEN 325 MG/1
650 TABLET ORAL ONCE
Status: COMPLETED | OUTPATIENT
Start: 2019-10-04 | End: 2019-10-04

## 2019-10-04 RX ADMIN — DIPHENHYDRAMINE HCL 25 MG: 25 MG CAPSULE ORAL at 11:55:00

## 2019-10-04 RX ADMIN — ACETAMINOPHEN 650 MG: 325 TABLET ORAL at 11:55:00

## 2019-10-04 NOTE — PROGRESS NOTES
Cancer Center Progress Note    Patient Name: Josefa Reese   YOB: 1965   Medical Record Number: [id number]   CSN: [id number]   Attending Physician: Bethanie Rivera M.D.    Referring Physician: Billie Desouza MD      Date of Visit: 10/4/2019 in known nodes. Slowly improved and was discharged home 9/7/19. History of Present Illness:  Doing better but still tires out and has taste changes therefore appetite decreased. Working fulltime and exhausted by the end of the day.  No bleeding, fevers MOUTH EVERY MORNING, Disp: 28 tablet, Rfl: 10  •  Albuterol Sulfate HFA (PROAIR HFA) 108 (90 Base) MCG/ACT Inhalation Aero Soln, INHALE 2 PUFFS BY MOUTH EVERY SIX HOURS AS NEEDED for wheezing, Disp: 8.5 g, Rfl: 2    Past Medical History:  Past Medical Hist and Sexual Activity      Alcohol use:  Yes        Alcohol/week: 3.0 standard drinks        Types: 3 Cans of beer per week        Frequency: 2-3 times a week        Drinks per session: 1 or 2        Comment: 4-6 Beers/week      Drug use: No      Sexual activ 18   Ht 1.88 m (6' 2.02\")   Wt 102.3 kg (225 lb 8 oz)   SpO2 98%   BMI 28.94 kg/m²         Physical Examination:  General: Patient is alert and oriented x 3, not in acute distress. Psych:  Mood and affect appropriate  HEENT: EOMs intact. PERRL.  Cassandra Alcantara 10/04/2019     10/04/2019    CO2 24.0 10/04/2019     Lab Component   Component Value Date/Time     10/04/2019 1035     08/04/2014 1537     Radiology:  PROCEDURE:  CT CHEST+ABDOMEN+PELVIS(ALL CNTRST ONLY)(CPT=71260/73251)     COMPARISON: AP dimension, previously 13.3 cm. Splenic attenuation is homogeneous. KIDNEYS:  No acute abnormality. ADRENALS:  Unremarkable. AORTA/VASCULAR:  No aortic aneurysm.       RETROPERITONEUM:  A previously measured 12 x 12 mm left para-aortic retro process. Dictated by: Quinn Galicia MD on 9/06/2019 at 9:23       Approved by: Quinn Galicia MD             Impression and Plan:  1. Malaise, body aches, fever, elevated LFTs- felt to be possible infectious process.  Felt better with supportive

## 2019-10-04 NOTE — PROGRESS NOTES
Education Record    Learner:  Patient    Disease / Diagnosis: CLL - IVIG infusion    Barriers / Limitations:  None   Comments:    Method:  Brief focused and Reinforcement   Comments:    General Topics:  Plan of care reviewed   Comments:    Outcome:  Shows

## 2019-10-09 ENCOUNTER — SOCIAL WORK SERVICES (OUTPATIENT)
Dept: HEMATOLOGY/ONCOLOGY | Facility: HOSPITAL | Age: 54
End: 2019-10-09

## 2019-10-09 NOTE — PROGRESS NOTES
wrote letter for Patient stating his need to leave work at [employer] to make appointments with Doctor; letter emailed to patient at [email or web address]

## 2019-10-10 ENCOUNTER — TELEPHONE (OUTPATIENT)
Dept: HEMATOLOGY/ONCOLOGY | Facility: HOSPITAL | Age: 54
End: 2019-10-10

## 2019-10-10 DIAGNOSIS — E11.9 TYPE 2 DIABETES MELLITUS WITHOUT COMPLICATION, WITHOUT LONG-TERM CURRENT USE OF INSULIN (HCC): ICD-10-CM

## 2019-10-10 DIAGNOSIS — I10 ESSENTIAL HYPERTENSION: Primary | ICD-10-CM

## 2019-10-10 RX ORDER — BENAZEPRIL HYDROCHLORIDE 10 MG/1
TABLET ORAL
Qty: 30 TABLET | Refills: 5 | Status: SHIPPED | OUTPATIENT
Start: 2019-10-10 | End: 2020-04-29

## 2019-10-10 RX ORDER — ATORVASTATIN CALCIUM 40 MG/1
TABLET, FILM COATED ORAL
Qty: 30 TABLET | Refills: 0 | OUTPATIENT
Start: 2019-10-10

## 2019-10-24 RX ORDER — ATORVASTATIN CALCIUM 40 MG/1
TABLET, FILM COATED ORAL
Qty: 30 TABLET | Refills: 0 | OUTPATIENT
Start: 2019-10-24

## 2019-11-01 ENCOUNTER — TELEPHONE (OUTPATIENT)
Dept: INTERNAL MEDICINE CLINIC | Facility: CLINIC | Age: 54
End: 2019-11-01

## 2019-11-01 ENCOUNTER — OFFICE VISIT (OUTPATIENT)
Dept: HEMATOLOGY/ONCOLOGY | Facility: HOSPITAL | Age: 54
End: 2019-11-01
Attending: INTERNAL MEDICINE
Payer: COMMERCIAL

## 2019-11-01 VITALS
HEIGHT: 74.02 IN | RESPIRATION RATE: 18 BRPM | BODY MASS INDEX: 28.75 KG/M2 | HEART RATE: 78 BPM | SYSTOLIC BLOOD PRESSURE: 113 MMHG | WEIGHT: 224 LBS | OXYGEN SATURATION: 98 % | DIASTOLIC BLOOD PRESSURE: 81 MMHG | TEMPERATURE: 98 F

## 2019-11-01 DIAGNOSIS — I10 ESSENTIAL HYPERTENSION: ICD-10-CM

## 2019-11-01 DIAGNOSIS — C91.10 CLL (CHRONIC LYMPHOCYTIC LEUKEMIA) (HCC): Primary | ICD-10-CM

## 2019-11-01 DIAGNOSIS — R74.01 TRANSAMINITIS: ICD-10-CM

## 2019-11-01 DIAGNOSIS — E78.2 MIXED HYPERLIPIDEMIA: ICD-10-CM

## 2019-11-01 DIAGNOSIS — D80.1 HYPOGAMMAGLOBULINEMIA (HCC): ICD-10-CM

## 2019-11-01 DIAGNOSIS — I10 ESSENTIAL HYPERTENSION: Primary | ICD-10-CM

## 2019-11-01 DIAGNOSIS — D69.6 THROMBOCYTOPENIA (HCC): ICD-10-CM

## 2019-11-01 DIAGNOSIS — R74.02 ELEVATED LDH: ICD-10-CM

## 2019-11-01 DIAGNOSIS — E11.9 TYPE 2 DIABETES MELLITUS WITHOUT COMPLICATION, WITHOUT LONG-TERM CURRENT USE OF INSULIN (HCC): ICD-10-CM

## 2019-11-01 PROCEDURE — 80053 COMPREHEN METABOLIC PANEL: CPT

## 2019-11-01 PROCEDURE — 82784 ASSAY IGA/IGD/IGG/IGM EACH: CPT

## 2019-11-01 PROCEDURE — 85025 COMPLETE CBC W/AUTO DIFF WBC: CPT

## 2019-11-01 PROCEDURE — 96365 THER/PROPH/DIAG IV INF INIT: CPT

## 2019-11-01 PROCEDURE — 99214 OFFICE O/P EST MOD 30 MIN: CPT | Performed by: INTERNAL MEDICINE

## 2019-11-01 PROCEDURE — 83036 HEMOGLOBIN GLYCOSYLATED A1C: CPT

## 2019-11-01 PROCEDURE — 96366 THER/PROPH/DIAG IV INF ADDON: CPT

## 2019-11-01 PROCEDURE — 83615 LACTATE (LD) (LDH) ENZYME: CPT

## 2019-11-01 PROCEDURE — 36415 COLL VENOUS BLD VENIPUNCTURE: CPT

## 2019-11-01 PROCEDURE — 80061 LIPID PANEL: CPT

## 2019-11-01 RX ORDER — DIPHENHYDRAMINE HCL 25 MG
25 CAPSULE ORAL ONCE
Status: COMPLETED | OUTPATIENT
Start: 2019-11-01 | End: 2019-11-01

## 2019-11-01 RX ORDER — ACETAMINOPHEN 325 MG/1
650 TABLET ORAL ONCE
Status: CANCELLED | OUTPATIENT
Start: 2019-12-01

## 2019-11-01 RX ORDER — ACETAMINOPHEN 325 MG/1
650 TABLET ORAL ONCE
Status: COMPLETED | OUTPATIENT
Start: 2019-11-01 | End: 2019-11-01

## 2019-11-01 RX ORDER — DIPHENHYDRAMINE HCL 25 MG
25 CAPSULE ORAL ONCE
Status: CANCELLED | OUTPATIENT
Start: 2019-12-01

## 2019-11-01 RX ADMIN — DIPHENHYDRAMINE HCL 25 MG: 25 MG CAPSULE ORAL at 13:50:00

## 2019-11-01 RX ADMIN — ACETAMINOPHEN 650 MG: 325 TABLET ORAL at 13:50:00

## 2019-11-01 NOTE — TELEPHONE ENCOUNTER
The pt reports being off of atorvastatin for the past 2 months. Lipid, UA and HgbA1c orders were entered for the pt. The pt is aware of the pending labs.

## 2019-11-01 NOTE — PROGRESS NOTES
Education Record    Learner:  Patient    Disease / Diagnosis: CLL - IVIG every 4 weeks.      Barriers / Limitations:  None   Comments:    Method:  Brief focused   Comments:    General Topics:  Medication, Side effects and symptom management and Plan of care

## 2019-11-01 NOTE — TELEPHONE ENCOUNTER
Patient called and stated he is going to the cancer center in 2 hours and will have labs completed, but wants to know if Dr Yudith Brock wants to order anything, so he can complete all at once. He had a cup of coffee with cream this morning; please advise.

## 2019-11-02 NOTE — PROGRESS NOTES
Cancer Center Progress Note    Patient Name: Reji Roberts   YOB: 1965   Medical Record Number: [id number]   CSN: [id number]   Attending Physician: Lisa Ye M.D.    Referring Physician: Raj Titus MD      Date of Visit: 11/1/2019 Illness:  He says he feels pretty much close to baseline. When he resumed Ibrutinib he said he had drenching sweats for about 4 days then resolved. Energy is better and has improved activity tolerance. Appetite has improved and eating well.  No further dysg Fexofenadine HCl (WAL-FEX ALLERGY) 180 MG Oral Tab, Take 1 tablet by mouth once daily. , Disp: 90 tablet, Rfl: 1  •  Multiple Vitamins-Minerals (CENTRUM SILVER ULTRA MENS) Oral Tab, Take  by mouth., Disp: , Rfl:     Past Medical History:  Past Medical Histo and Sexual Activity      Alcohol use:  Yes        Alcohol/week: 3.0 standard drinks        Types: 3 Cans of beer per week        Frequency: 2-3 times a week        Drinks per session: 1 or 2        Comment: 4-6 Beers/week      Drug use: No      Sexual activ in acute distress. Psych:  Mood and affect appropriate  HEENT: EOMs intact. PERRL. Oropharynx is clear. Neck: No JVD. No palpable adenopathy  Neck is supple. Lymphatics: No palpable axillary or inguinal nodes   Chest: Clear to auscultation.   Heart: Reg 9/5/2019 16:18 10/4/2019 10:35 11/1/2019 13:21   IMMUNOGLOBULIN G Latest Ref Range: 791-1,643 mg/dL 179 (L) 363 (L) 485 (L)       Radiology:  PROCEDURE:  CT CHEST+ABDOMEN+PELVIS(ALL CNTRST ONLY)(CPT=71260/37502)     COMPARISON:  [facility] , CT CHEST+ABDOMEN+P 13.3 cm. Splenic attenuation is homogeneous. KIDNEYS:  No acute abnormality. ADRENALS:  Unremarkable. AORTA/VASCULAR:  No aortic aneurysm.       RETROPERITONEUM:  A previously measured 12 x 12 mm left para-aortic retroperitoneal lymph node has by: Ian Mcmanus MD on 9/06/2019 at 9:23       Approved by: Ian Mcmanus MD             Impression and Plan:  1. Malaise, body aches, fever, elevated LFTs- felt to be possible infectious process.  Continues to improve and feels pretty close to baselin

## 2019-11-07 ENCOUNTER — OFFICE VISIT (OUTPATIENT)
Dept: INTERNAL MEDICINE CLINIC | Facility: CLINIC | Age: 54
End: 2019-11-07
Payer: COMMERCIAL

## 2019-11-07 VITALS
HEIGHT: 74.02 IN | SYSTOLIC BLOOD PRESSURE: 132 MMHG | HEART RATE: 84 BPM | WEIGHT: 224 LBS | BODY MASS INDEX: 28.75 KG/M2 | RESPIRATION RATE: 16 BRPM | OXYGEN SATURATION: 99 % | TEMPERATURE: 99 F | DIASTOLIC BLOOD PRESSURE: 64 MMHG

## 2019-11-07 DIAGNOSIS — E11.9 TYPE 2 DIABETES MELLITUS WITHOUT COMPLICATION, WITHOUT LONG-TERM CURRENT USE OF INSULIN (HCC): Primary | ICD-10-CM

## 2019-11-07 DIAGNOSIS — I10 ESSENTIAL HYPERTENSION: ICD-10-CM

## 2019-11-07 DIAGNOSIS — Z23 NEED FOR PNEUMOCOCCAL VACCINATION: ICD-10-CM

## 2019-11-07 PROBLEM — N02.9 BENIGN HEMATURIA: Status: ACTIVE | Noted: 2019-11-07

## 2019-11-07 PROBLEM — E87.1 HYPONATREMIA: Status: RESOLVED | Noted: 2019-09-05 | Resolved: 2019-11-07

## 2019-11-07 PROCEDURE — 90471 IMMUNIZATION ADMIN: CPT | Performed by: INTERNAL MEDICINE

## 2019-11-07 PROCEDURE — 90670 PCV13 VACCINE IM: CPT | Performed by: INTERNAL MEDICINE

## 2019-11-07 PROCEDURE — 99214 OFFICE O/P EST MOD 30 MIN: CPT | Performed by: INTERNAL MEDICINE

## 2019-11-07 RX ORDER — FOLIC ACID 1 MG/1
TABLET ORAL DAILY
COMMUNITY

## 2019-11-07 NOTE — PATIENT INSTRUCTIONS
Diabetes: Activity Tips    Being more active can help you manage your diabetes. The tips on this sheet can help you get the most from your exercise. They can also help you stay safe.   Staying active  It’s important for adults to spend less time sitting a You may be told to plan your exercise for 1 to 2 hours after a meal. In most cases, you don’t need to eat while being active. Test your blood sugar before exercising if you take insulin or medicine that can cause low blood sugar.  And carry a fast-acting childers

## 2019-11-08 NOTE — PROGRESS NOTES
HPI:    Patient ID: Valeri Rodrigues is a 47year old male. Diabetes       HPI:   Valeri Rodrigues is a 47year old male who presents for recheck of his diabetes and htn. Patient’s FBS have been improved since wt loss due to CLL treatment. No hypoglycemia.   Pt ONE TIME A DAY  30 tablet 5   • METFORMIN  MG Oral Tab TAKE 1 TABLET BY MOUTH TWO TIMES A DAY  60 tablet 5   • acyclovir 400 MG Oral Tab Take 1 tablet (400 mg total) by mouth 2 (two) times daily.  60 tablet 3   • fluconazole 100 MG Oral Tab Take  1 t essential hypertension    • Visual impairment     glasses      Past Surgical History:   Procedure Laterality Date   • CERVICAL LYMPH NODE BX Right 5/27/2016    Performed by Diana [facility]ung [facility] at [facility] MAIN OR   • COLONOSCOPY     • OTHER  05/27/2016    ju check HgbA1C,  fasting lipids and CMP in 6 m, lose wgt with carbohydrate controlled diet and exercise, refer to Ophthamology, check feet daily. The patient indicates understanding of these issues and agrees to the plan.   The patient is asked to return in Prochlorperazine Maleate (COMPAZINE) 10 mg tablet Take 1 tablet (10 mg total) by mouth every 6 (six) hours as needed for Nausea.  30 tablet 3     Allergies:  Codeine                 OTHER (SEE COMMENTS)    Comment:nausea  Dust                    SHORTNESS O

## 2019-11-25 DIAGNOSIS — J45.40 MODERATE PERSISTENT ASTHMA WITHOUT COMPLICATION: ICD-10-CM

## 2019-11-25 DIAGNOSIS — E11.9 TYPE 2 DIABETES MELLITUS WITHOUT COMPLICATION, WITHOUT LONG-TERM CURRENT USE OF INSULIN (HCC): ICD-10-CM

## 2019-11-25 RX ORDER — GLIMEPIRIDE 2 MG/1
TABLET ORAL
Qty: 30 TABLET | Refills: 5 | Status: SHIPPED | OUTPATIENT
Start: 2019-11-25 | End: 2020-05-27

## 2019-11-29 ENCOUNTER — APPOINTMENT (OUTPATIENT)
Dept: HEMATOLOGY/ONCOLOGY | Facility: HOSPITAL | Age: 54
End: 2019-11-29
Attending: INTERNAL MEDICINE
Payer: COMMERCIAL

## 2019-12-02 ENCOUNTER — APPOINTMENT (OUTPATIENT)
Dept: HEMATOLOGY/ONCOLOGY | Facility: HOSPITAL | Age: 54
End: 2019-12-02
Attending: INTERNAL MEDICINE
Payer: COMMERCIAL

## 2020-01-03 ENCOUNTER — PATIENT MESSAGE (OUTPATIENT)
Dept: HEMATOLOGY/ONCOLOGY | Facility: HOSPITAL | Age: 55
End: 2020-01-03

## 2020-01-15 ENCOUNTER — TELEPHONE (OUTPATIENT)
Dept: INTERNAL MEDICINE CLINIC | Facility: CLINIC | Age: 55
End: 2020-01-15

## 2020-01-15 DIAGNOSIS — Z00.00 LABORATORY EXAMINATION ORDERED AS PART OF A ROUTINE GENERAL MEDICAL EXAMINATION: Primary | ICD-10-CM

## 2020-01-15 NOTE — TELEPHONE ENCOUNTER
Patient called and is scheduled for an annual on 05/08/2020, and requested labs to be ordered with Stan Sergeant reference. Patient aware to fast. Some of them were already entered by a specialist- DONI.

## 2020-01-16 ENCOUNTER — OFFICE VISIT (OUTPATIENT)
Dept: HEMATOLOGY/ONCOLOGY | Facility: HOSPITAL | Age: 55
End: 2020-01-16
Attending: INTERNAL MEDICINE
Payer: COMMERCIAL

## 2020-01-16 VITALS
OXYGEN SATURATION: 97 % | WEIGHT: 240 LBS | HEIGHT: 74.02 IN | SYSTOLIC BLOOD PRESSURE: 120 MMHG | BODY MASS INDEX: 30.8 KG/M2 | DIASTOLIC BLOOD PRESSURE: 80 MMHG | RESPIRATION RATE: 18 BRPM | HEART RATE: 80 BPM | TEMPERATURE: 97 F

## 2020-01-16 DIAGNOSIS — D69.6 THROMBOCYTOPENIA (HCC): Primary | ICD-10-CM

## 2020-01-16 DIAGNOSIS — M65.341 TRIGGER RING FINGER OF RIGHT HAND: ICD-10-CM

## 2020-01-16 DIAGNOSIS — C91.10 CLL (CHRONIC LYMPHOCYTIC LEUKEMIA) (HCC): ICD-10-CM

## 2020-01-16 DIAGNOSIS — D80.1 HYPOGAMMAGLOBULINEMIA (HCC): ICD-10-CM

## 2020-01-16 DIAGNOSIS — H72.92 PERFORATION OF LEFT TYMPANIC MEMBRANE: ICD-10-CM

## 2020-01-16 DIAGNOSIS — R74.01 TRANSAMINITIS: ICD-10-CM

## 2020-01-16 LAB
ALBUMIN SERPL-MCNC: 4.1 G/DL (ref 3.4–5)
ALBUMIN/GLOB SERPL: 1.3 {RATIO} (ref 1–2)
ALP LIVER SERPL-CCNC: 61 U/L (ref 45–117)
ALT SERPL-CCNC: 32 U/L (ref 16–61)
ANION GAP SERPL CALC-SCNC: 4 MMOL/L (ref 0–18)
AST SERPL-CCNC: 20 U/L (ref 15–37)
BASOPHILS # BLD AUTO: 0.11 X10(3) UL (ref 0–0.2)
BASOPHILS NFR BLD AUTO: 0.7 %
BILIRUB SERPL-MCNC: 0.4 MG/DL (ref 0.1–2)
BUN BLD-MCNC: 11 MG/DL (ref 7–18)
BUN/CREAT SERPL: 11.5 (ref 10–20)
CALCIUM BLD-MCNC: 9.6 MG/DL (ref 8.5–10.1)
CHLORIDE SERPL-SCNC: 108 MMOL/L (ref 98–112)
CO2 SERPL-SCNC: 29 MMOL/L (ref 21–32)
CREAT BLD-MCNC: 0.96 MG/DL (ref 0.7–1.3)
DEPRECATED RDW RBC AUTO: 43.8 FL (ref 35.1–46.3)
EOSINOPHIL # BLD AUTO: 0.22 X10(3) UL (ref 0–0.7)
EOSINOPHIL NFR BLD AUTO: 1.5 %
ERYTHROCYTE [DISTWIDTH] IN BLOOD BY AUTOMATED COUNT: 13.8 % (ref 11–15)
GLOBULIN PLAS-MCNC: 3.1 G/DL (ref 2.8–4.4)
GLUCOSE BLD-MCNC: 131 MG/DL (ref 70–99)
HCT VFR BLD AUTO: 43.4 % (ref 39–53)
HGB BLD-MCNC: 14.4 G/DL (ref 13–17.5)
IMM GRANULOCYTES # BLD AUTO: 0.04 X10(3) UL (ref 0–1)
IMM GRANULOCYTES NFR BLD: 0.3 %
IMMUNOGLOBULIN PNL SER-MCNC: 382 MG/DL (ref 791–1643)
LDH SERPL L TO P-CCNC: 130 U/L (ref 87–241)
LYMPHOCYTES # BLD AUTO: 7.67 X10(3) UL (ref 1–4)
LYMPHOCYTES NFR BLD AUTO: 50.7 %
M PROTEIN MFR SERPL ELPH: 7.2 G/DL (ref 6.4–8.2)
MCH RBC QN AUTO: 29 PG (ref 26–34)
MCHC RBC AUTO-ENTMCNC: 33.2 G/DL (ref 31–37)
MCV RBC AUTO: 87.5 FL (ref 80–100)
MONOCYTES # BLD AUTO: 0.69 X10(3) UL (ref 0.1–1)
MONOCYTES NFR BLD AUTO: 4.6 %
NEUTROPHILS # BLD AUTO: 6.39 X10 (3) UL (ref 1.5–7.7)
NEUTROPHILS # BLD AUTO: 6.39 X10(3) UL (ref 1.5–7.7)
NEUTROPHILS NFR BLD AUTO: 42.2 %
OSMOLALITY SERPL CALC.SUM OF ELEC: 293 MOSM/KG (ref 275–295)
PATIENT FASTING Y/N/NP: NO
PLATELET # BLD AUTO: 176 10(3)UL (ref 150–450)
POTASSIUM SERPL-SCNC: 4.2 MMOL/L (ref 3.5–5.1)
RBC # BLD AUTO: 4.96 X10(6)UL (ref 4.3–5.7)
SODIUM SERPL-SCNC: 141 MMOL/L (ref 136–145)
WBC # BLD AUTO: 15.1 X10(3) UL (ref 4–11)

## 2020-01-16 PROCEDURE — 99215 OFFICE O/P EST HI 40 MIN: CPT | Performed by: INTERNAL MEDICINE

## 2020-01-16 NOTE — PROGRESS NOTES
Cancer Center Progress Note    Patient Name: Patricia Guevara   YOB: 1965   Medical Record Number: [id number]   CSN: [id number]   Attending Physician: Cullen Escobar M.D.    Referring Physician: Pepper Schafer MD      Date of Visit: 1/16/2020 IVIG for hypogammaglobulinemia. CT C/A/P showed splenomegaly with decrease in known nodes. Slowly improved and was discharged home 9/7/19.     - Resumed Ibrutinib 10/4/19    History of Present Illness:  Recovering from a cold otherwise feels fair.  When he SILDENAFIL CITRATE 100 MG Oral Tab, TAKE 1 TABLET BY MOUTH ONE TIME A DAY AS NEEDED FOR ERECTILE DYSFUNCTION, Disp: 6 tablet, Rfl: 5  •  Prochlorperazine Maleate (COMPAZINE) 10 mg tablet, Take 1 tablet (10 mg total) by mouth every 6 (six) hours as needed f Worry: Not on file        Inability: Not on file      Transportation needs:        Medical: Not on file        Non-medical: Not on file    Tobacco Use      Smoking status: Current Every Day Smoker        Packs/day: 1.00        Years: 20.00        Pack year SHORTNESS OF BREATH  Mold                    SHORTNESS OF BREATH  Pollen                  SHORTNESS OF BREATH     Review of Systems:  A 14-point ROS was done with pertinent positives and negative per the HPI    Vital Signs:  /80 (BP Locat BUNCREA 11.5 01/16/2020    CREATSERUM 0.96 01/16/2020    ANIONGAP 4 01/16/2020    GFR 98 01/11/2018    GFRNAA 89 01/16/2020    GFRAA 103 01/16/2020    CA 9.6 01/16/2020    OSMOCALC 293 01/16/2020    ALKPHO 61 01/16/2020    AST 20 01/16/2020    ALT 32 01 previous size measurements   up to 3.4 cm on the right, and up to 2.4 cm on the left. .     ABDOMEN/PELVIS:     LIVER:  Unremarkable. BILIARY:  Unremarkable. PANCREAS:  Unremarkable.      SPLEEN:  The spleen has increased in size since the prior CT nodes, now normal size, the largest measuring 9 mm left axilla. Stable right hilar lymph node mildly enlarged. 3. Decrease in the size of retroperitoneal lymph node. Numerous subcentimeter stable appearing retroperitoneal and mesenteric lymph nodes.

## 2020-01-17 ENCOUNTER — TELEPHONE (OUTPATIENT)
Dept: HEMATOLOGY/ONCOLOGY | Facility: HOSPITAL | Age: 55
End: 2020-01-17

## 2020-01-17 DIAGNOSIS — N52.9 ERECTILE DYSFUNCTION, UNSPECIFIED ERECTILE DYSFUNCTION TYPE: ICD-10-CM

## 2020-01-17 RX ORDER — ATORVASTATIN CALCIUM 40 MG/1
TABLET, FILM COATED ORAL
Qty: 30 TABLET | Refills: 0 | OUTPATIENT
Start: 2020-01-17

## 2020-01-17 RX ORDER — SILDENAFIL 100 MG/1
TABLET, FILM COATED ORAL
Qty: 2 TABLET | Refills: 5 | Status: SHIPPED | OUTPATIENT
Start: 2020-01-17 | End: 2020-03-16

## 2020-01-17 NOTE — TELEPHONE ENCOUNTER
Kellie Ramos called and was looking [facility] has Gammagard in stock they had him sign up to get it through them the other place was out long term. He asked if she could put it in through them so he can get this squared away.

## 2020-01-20 ENCOUNTER — TELEPHONE (OUTPATIENT)
Dept: HEMATOLOGY/ONCOLOGY | Facility: HOSPITAL | Age: 55
End: 2020-01-20

## 2020-01-20 DIAGNOSIS — C91.10 CLL (CHRONIC LYMPHOCYTIC LEUKEMIA) (HCC): Primary | ICD-10-CM

## 2020-01-20 DIAGNOSIS — D80.1 HYPOGAMMAGLOBULINEMIA (HCC): ICD-10-CM

## 2020-01-30 RX ORDER — ACYCLOVIR 400 MG/1
TABLET ORAL
Qty: 60 TABLET | Refills: 2 | Status: SHIPPED | OUTPATIENT
Start: 2020-01-30 | End: 2020-04-29

## 2020-01-30 RX ORDER — FLUCONAZOLE 100 MG/1
TABLET ORAL
Qty: 30 TABLET | Refills: 2 | Status: SHIPPED | OUTPATIENT
Start: 2020-01-30 | End: 2020-04-29

## 2020-02-05 ENCOUNTER — TELEPHONE (OUTPATIENT)
Dept: HEMATOLOGY/ONCOLOGY | Facility: HOSPITAL | Age: 55
End: 2020-02-05

## 2020-02-05 NOTE — TELEPHONE ENCOUNTER
Kellen Ventura called saying he talked to [facility] pharmacy, and they approved and insurance approved the IG IV, and they will call him when it ships out today. He would like to know if he can set up an appointment for Friday when it ships.  Please call

## 2020-02-06 ENCOUNTER — TELEPHONE (OUTPATIENT)
Dept: HEMATOLOGY/ONCOLOGY | Facility: HOSPITAL | Age: 55
End: 2020-02-06

## 2020-02-06 NOTE — TELEPHONE ENCOUNTER
This RN received phone call from [facility] in regards to shipment of IVIG. First shipment set up for tomorrow and will be delivered in AM for infusion at 1pm. Number received for patient to call and have future shipments set up.  This RN spoke with olya

## 2020-02-07 ENCOUNTER — OFFICE VISIT (OUTPATIENT)
Dept: HEMATOLOGY/ONCOLOGY | Facility: HOSPITAL | Age: 55
End: 2020-02-07
Attending: INTERNAL MEDICINE
Payer: COMMERCIAL

## 2020-02-07 VITALS
SYSTOLIC BLOOD PRESSURE: 123 MMHG | OXYGEN SATURATION: 98 % | DIASTOLIC BLOOD PRESSURE: 61 MMHG | HEART RATE: 80 BPM | RESPIRATION RATE: 16 BRPM | TEMPERATURE: 97 F

## 2020-02-07 DIAGNOSIS — C91.10 CLL (CHRONIC LYMPHOCYTIC LEUKEMIA) (HCC): Primary | ICD-10-CM

## 2020-02-07 PROCEDURE — 96365 THER/PROPH/DIAG IV INF INIT: CPT

## 2020-02-07 PROCEDURE — 96366 THER/PROPH/DIAG IV INF ADDON: CPT

## 2020-02-07 RX ORDER — DIPHENHYDRAMINE HCL 25 MG
25 CAPSULE ORAL ONCE
Status: COMPLETED | OUTPATIENT
Start: 2020-02-07 | End: 2020-02-07

## 2020-02-07 RX ORDER — ACETAMINOPHEN 325 MG/1
650 TABLET ORAL ONCE
Status: COMPLETED | OUTPATIENT
Start: 2020-02-07 | End: 2020-02-07

## 2020-02-07 RX ORDER — DIPHENHYDRAMINE HCL 25 MG
25 CAPSULE ORAL ONCE
Status: CANCELLED | OUTPATIENT
Start: 2020-03-01

## 2020-02-07 RX ORDER — ACETAMINOPHEN 325 MG/1
650 TABLET ORAL ONCE
Status: CANCELLED | OUTPATIENT
Start: 2020-03-01

## 2020-02-07 RX ADMIN — DIPHENHYDRAMINE HCL 25 MG: 25 MG CAPSULE ORAL at 13:12:00

## 2020-02-07 RX ADMIN — ACETAMINOPHEN 650 MG: 325 TABLET ORAL at 13:12:00

## 2020-02-07 NOTE — PROGRESS NOTES
Education Record    Learner:  Patient    Disease / Larwance Juan    Barriers / Limitations:  None   Comments:    Method:  Discussion and Reinforcement   Comments:    General Topics:  Medication, Side effects and symptom management, Plan o

## 2020-03-06 ENCOUNTER — OFFICE VISIT (OUTPATIENT)
Dept: HEMATOLOGY/ONCOLOGY | Facility: HOSPITAL | Age: 55
End: 2020-03-06
Attending: INTERNAL MEDICINE
Payer: COMMERCIAL

## 2020-03-06 VITALS
TEMPERATURE: 96 F | HEART RATE: 88 BPM | WEIGHT: 244 LBS | RESPIRATION RATE: 18 BRPM | BODY MASS INDEX: 31.32 KG/M2 | OXYGEN SATURATION: 98 % | SYSTOLIC BLOOD PRESSURE: 119 MMHG | DIASTOLIC BLOOD PRESSURE: 75 MMHG | HEIGHT: 74.02 IN

## 2020-03-06 DIAGNOSIS — C91.10 CLL (CHRONIC LYMPHOCYTIC LEUKEMIA) (HCC): Primary | ICD-10-CM

## 2020-03-06 PROCEDURE — 96366 THER/PROPH/DIAG IV INF ADDON: CPT

## 2020-03-06 PROCEDURE — 96365 THER/PROPH/DIAG IV INF INIT: CPT

## 2020-03-06 RX ORDER — DIPHENHYDRAMINE HCL 25 MG
25 CAPSULE ORAL ONCE
Status: COMPLETED | OUTPATIENT
Start: 2020-03-06 | End: 2020-03-06

## 2020-03-06 RX ORDER — ACETAMINOPHEN 325 MG/1
650 TABLET ORAL ONCE
Status: CANCELLED | OUTPATIENT
Start: 2020-04-01

## 2020-03-06 RX ORDER — DIPHENHYDRAMINE HCL 25 MG
25 CAPSULE ORAL ONCE
Status: CANCELLED | OUTPATIENT
Start: 2020-04-01

## 2020-03-06 RX ORDER — ACETAMINOPHEN 325 MG/1
650 TABLET ORAL ONCE
Status: COMPLETED | OUTPATIENT
Start: 2020-03-06 | End: 2020-03-06

## 2020-03-06 RX ADMIN — DIPHENHYDRAMINE HCL 25 MG: 25 MG CAPSULE ORAL at 13:19:00

## 2020-03-06 RX ADMIN — ACETAMINOPHEN 650 MG: 325 TABLET ORAL at 13:19:00

## 2020-03-14 DIAGNOSIS — N52.9 ERECTILE DYSFUNCTION, UNSPECIFIED ERECTILE DYSFUNCTION TYPE: ICD-10-CM

## 2020-03-16 RX ORDER — PROCHLORPERAZINE MALEATE 10 MG
TABLET ORAL
Qty: 30 TABLET | Refills: 3 | Status: SHIPPED | OUTPATIENT
Start: 2020-03-16

## 2020-03-16 RX ORDER — SILDENAFIL 100 MG/1
TABLET, FILM COATED ORAL
Qty: 2 TABLET | Refills: 0 | Status: SHIPPED | OUTPATIENT
Start: 2020-03-16 | End: 2020-04-29

## 2020-04-03 ENCOUNTER — OFFICE VISIT (OUTPATIENT)
Dept: HEMATOLOGY/ONCOLOGY | Facility: HOSPITAL | Age: 55
End: 2020-04-03
Attending: INTERNAL MEDICINE
Payer: COMMERCIAL

## 2020-04-03 VITALS
HEART RATE: 89 BPM | WEIGHT: 248.81 LBS | OXYGEN SATURATION: 98 % | SYSTOLIC BLOOD PRESSURE: 115 MMHG | BODY MASS INDEX: 31.93 KG/M2 | DIASTOLIC BLOOD PRESSURE: 74 MMHG | RESPIRATION RATE: 18 BRPM | HEIGHT: 74.02 IN

## 2020-04-03 DIAGNOSIS — R74.01 TRANSAMINITIS: ICD-10-CM

## 2020-04-03 DIAGNOSIS — D80.1 HYPOGAMMAGLOBULINEMIA (HCC): ICD-10-CM

## 2020-04-03 DIAGNOSIS — R79.89 BLOOD CREATININE INCREASED COMPARED WITH PRIOR MEASUREMENT: ICD-10-CM

## 2020-04-03 DIAGNOSIS — D72.820 LYMPHOCYTOSIS: ICD-10-CM

## 2020-04-03 DIAGNOSIS — C91.10 CLL (CHRONIC LYMPHOCYTIC LEUKEMIA) (HCC): Primary | ICD-10-CM

## 2020-04-03 DIAGNOSIS — D69.6 THROMBOCYTOPENIA (HCC): ICD-10-CM

## 2020-04-03 PROCEDURE — 36415 COLL VENOUS BLD VENIPUNCTURE: CPT

## 2020-04-03 PROCEDURE — 96374 THER/PROPH/DIAG INJ IV PUSH: CPT

## 2020-04-03 PROCEDURE — 80053 COMPREHEN METABOLIC PANEL: CPT

## 2020-04-03 PROCEDURE — 84550 ASSAY OF BLOOD/URIC ACID: CPT

## 2020-04-03 PROCEDURE — 83615 LACTATE (LD) (LDH) ENZYME: CPT

## 2020-04-03 PROCEDURE — 99215 OFFICE O/P EST HI 40 MIN: CPT | Performed by: INTERNAL MEDICINE

## 2020-04-03 PROCEDURE — 85025 COMPLETE CBC W/AUTO DIFF WBC: CPT

## 2020-04-03 PROCEDURE — 82784 ASSAY IGA/IGD/IGG/IGM EACH: CPT

## 2020-04-03 RX ORDER — ACETAMINOPHEN 325 MG/1
650 TABLET ORAL ONCE
Status: CANCELLED | OUTPATIENT
Start: 2020-05-01

## 2020-04-03 RX ORDER — ACETAMINOPHEN 325 MG/1
650 TABLET ORAL ONCE
Status: COMPLETED | OUTPATIENT
Start: 2020-04-03 | End: 2020-04-03

## 2020-04-03 RX ORDER — DIPHENHYDRAMINE HCL 25 MG
25 CAPSULE ORAL ONCE
Status: CANCELLED | OUTPATIENT
Start: 2020-05-01

## 2020-04-03 RX ORDER — DIPHENHYDRAMINE HCL 25 MG
25 CAPSULE ORAL ONCE
Status: COMPLETED | OUTPATIENT
Start: 2020-04-03 | End: 2020-04-03

## 2020-04-03 RX ADMIN — ACETAMINOPHEN 650 MG: 325 TABLET ORAL at 12:59:00

## 2020-04-03 RX ADMIN — DIPHENHYDRAMINE HCL 25 MG: 25 MG CAPSULE ORAL at 12:59:00

## 2020-04-03 NOTE — PROGRESS NOTES
Cancer Center Progress Note    Patient Name: Woody Britt   YOB: 1965   Medical Record Number: [id number]   CSN: [id number]   Attending Physician: Rory Abbasi M.D.    Referring Physician: Kellie Thomas MD      Date of Visit: 4/3/2020     ANA Illness:  Has been feeling well. Worried about [place] and continues to work. Energy is good and eating well. Denies SOB, abdominal pain, change in his bowel habits, headaches, dizziness or visual symptoms. No fevers or weight loss.  Dry skin with some crack SIX HOURS AS NEEDED for wheezing, Disp: 8.5 g, Rfl: 2  •  aspirin 81 MG Oral Tab, Take 1 tablet by mouth daily. , Disp: , Rfl: 0  •  Fexofenadine HCl (WAL-FEX ALLERGY) 180 MG Oral Tab, Take 1 tablet by mouth once daily. , Disp: 90 tablet, Rfl: 1  •  Multiple Pack years: 20        Types: Cigarettes      Smokeless tobacco: Never Used      Tobacco comment: pipes and cigars    Substance and Sexual Activity      Alcohol use:  Yes        Alcohol/week: 3.0 standard drinks        Types: 3 Cans of beer per week refer to the nursing notes which have been reviewed      Physical Examination:  General: Patient is alert and oriented x 3, not in acute distress. Psych:  Mood and affect appropriate  HEENT: EOMs intact. PERRL. Oropharynx is clear. Neck: No JVD.  No palpab fL    MCH 30.5 26.0 - 34.0 pg    MCHC 33.6 31.0 - 37.0 g/dL    RDW 11.9 11.0 - 15.0 %    RDW-SD 39.1 35.1 - 46.3 fL    Neutrophil Absolute Prelim 4.77 1.50 - 7.70 x10 (3) uL    Neutrophil Absolute 4.77 1.50 - 7.70 x10(3) uL    Lymphocyte Absolute 5.02 (H) pleural effusion. Stable 5 mm right lower lobe pulmonary nodule, no change since June 2016, more than 3 years, benign     THORACIC AORTA:  No aneurysm or other acute process     MEDIASTINUM/AURA:  Stable right hilar lymph node 17 x 18 mm size. Guadalupe Riedel CARDIA IN THE CHEST/ABDOMEN/PELVIS:     BONES:  No acute abnormality.         =====  CONCLUSION:       1. Development of splenomegaly, with obvious change in the volume of the spleen when compared with a relatively recent CT scan of the abdomen from May 2019.  Magalie level high- CLL on targeted therapy       Emotional Well Being:  I have assessed the patient's emotional well-being and any concerns about anxiety or depression.   We discussed issues of distress, coping difficulties and social support systems and currently

## 2020-04-03 NOTE — PROGRESS NOTES
Education Record    Learner:  Patient    Disease / Diagnosis:CLL    Barriers / Limitations:  None   Comments:    Method:  Discussion   Comments:    General Topics:  Plan of care reviewed   Comments:    Outcome:  Shows understanding   Comments:    Patient h

## 2020-04-28 DIAGNOSIS — I10 ESSENTIAL HYPERTENSION: ICD-10-CM

## 2020-04-28 DIAGNOSIS — E11.9 TYPE 2 DIABETES MELLITUS WITHOUT COMPLICATION, WITHOUT LONG-TERM CURRENT USE OF INSULIN (HCC): ICD-10-CM

## 2020-04-28 DIAGNOSIS — N52.9 ERECTILE DYSFUNCTION, UNSPECIFIED ERECTILE DYSFUNCTION TYPE: ICD-10-CM

## 2020-04-29 RX ORDER — FLUCONAZOLE 100 MG/1
TABLET ORAL
Qty: 30 TABLET | Refills: 2 | Status: SHIPPED | OUTPATIENT
Start: 2020-04-29 | End: 2020-07-30

## 2020-04-29 RX ORDER — SILDENAFIL 100 MG/1
TABLET, FILM COATED ORAL
Qty: 2 TABLET | Refills: 5 | Status: SHIPPED | OUTPATIENT
Start: 2020-04-29 | End: 2020-09-10

## 2020-04-29 RX ORDER — ACYCLOVIR 400 MG/1
TABLET ORAL
Qty: 60 TABLET | Refills: 2 | Status: SHIPPED | OUTPATIENT
Start: 2020-04-29 | End: 2020-07-30

## 2020-04-29 RX ORDER — BENAZEPRIL HYDROCHLORIDE 10 MG/1
TABLET ORAL
Qty: 30 TABLET | Refills: 5 | Status: SHIPPED | OUTPATIENT
Start: 2020-04-29 | End: 2020-09-30

## 2020-05-01 ENCOUNTER — OFFICE VISIT (OUTPATIENT)
Dept: HEMATOLOGY/ONCOLOGY | Facility: HOSPITAL | Age: 55
End: 2020-05-01
Attending: INTERNAL MEDICINE
Payer: COMMERCIAL

## 2020-05-01 VITALS
DIASTOLIC BLOOD PRESSURE: 85 MMHG | HEART RATE: 84 BPM | SYSTOLIC BLOOD PRESSURE: 147 MMHG | RESPIRATION RATE: 18 BRPM | BODY MASS INDEX: 32 KG/M2 | TEMPERATURE: 97 F | OXYGEN SATURATION: 98 % | WEIGHT: 249.63 LBS

## 2020-05-01 DIAGNOSIS — D80.1 HYPOGAMMAGLOBULINEMIA (HCC): ICD-10-CM

## 2020-05-01 DIAGNOSIS — C91.10 CLL (CHRONIC LYMPHOCYTIC LEUKEMIA) (HCC): Primary | ICD-10-CM

## 2020-05-01 PROCEDURE — 96366 THER/PROPH/DIAG IV INF ADDON: CPT

## 2020-05-01 PROCEDURE — 82784 ASSAY IGA/IGD/IGG/IGM EACH: CPT

## 2020-05-01 PROCEDURE — 96365 THER/PROPH/DIAG IV INF INIT: CPT

## 2020-05-01 RX ORDER — ACETAMINOPHEN 325 MG/1
650 TABLET ORAL ONCE
Status: COMPLETED | OUTPATIENT
Start: 2020-05-01 | End: 2020-05-01

## 2020-05-01 RX ORDER — DIPHENHYDRAMINE HCL 25 MG
25 CAPSULE ORAL ONCE
Status: COMPLETED | OUTPATIENT
Start: 2020-05-01 | End: 2020-05-01

## 2020-05-01 RX ORDER — DIPHENHYDRAMINE HCL 25 MG
25 CAPSULE ORAL ONCE
Status: CANCELLED | OUTPATIENT
Start: 2020-06-01

## 2020-05-01 RX ORDER — ACETAMINOPHEN 325 MG/1
650 TABLET ORAL ONCE
Status: CANCELLED | OUTPATIENT
Start: 2020-06-01

## 2020-05-01 RX ADMIN — DIPHENHYDRAMINE HCL 25 MG: 25 MG CAPSULE ORAL at 13:00:00

## 2020-05-01 RX ADMIN — ACETAMINOPHEN 650 MG: 325 TABLET ORAL at 13:00:00

## 2020-05-01 NOTE — PROGRESS NOTES
Education Record    Learner:  Patient    Disease / Diagnosis: hypogammaglobulinemia    Barriers / Limitations:  None   Comments:    Method:  Brief focused   Comments:    General Topics:  Plan of care reviewed   Comments:    Outcome:  Shows understanding

## 2020-05-27 DIAGNOSIS — E11.9 TYPE 2 DIABETES MELLITUS WITHOUT COMPLICATION, WITHOUT LONG-TERM CURRENT USE OF INSULIN (HCC): ICD-10-CM

## 2020-05-27 RX ORDER — GLIMEPIRIDE 2 MG/1
TABLET ORAL
Qty: 30 TABLET | Refills: 5 | Status: SHIPPED | OUTPATIENT
Start: 2020-05-27 | End: 2020-07-10 | Stop reason: DRUGHIGH

## 2020-05-29 ENCOUNTER — OFFICE VISIT (OUTPATIENT)
Dept: HEMATOLOGY/ONCOLOGY | Facility: HOSPITAL | Age: 55
End: 2020-05-29
Attending: INTERNAL MEDICINE
Payer: COMMERCIAL

## 2020-05-29 VITALS
BODY MASS INDEX: 32.21 KG/M2 | OXYGEN SATURATION: 97 % | DIASTOLIC BLOOD PRESSURE: 77 MMHG | TEMPERATURE: 98 F | WEIGHT: 251 LBS | HEIGHT: 74.02 IN | HEART RATE: 83 BPM | SYSTOLIC BLOOD PRESSURE: 129 MMHG | RESPIRATION RATE: 18 BRPM

## 2020-05-29 DIAGNOSIS — C91.10 CLL (CHRONIC LYMPHOCYTIC LEUKEMIA) (HCC): Primary | ICD-10-CM

## 2020-05-29 PROCEDURE — 96366 THER/PROPH/DIAG IV INF ADDON: CPT

## 2020-05-29 PROCEDURE — 96365 THER/PROPH/DIAG IV INF INIT: CPT

## 2020-05-29 PROCEDURE — 36415 COLL VENOUS BLD VENIPUNCTURE: CPT

## 2020-05-29 PROCEDURE — 80048 BASIC METABOLIC PNL TOTAL CA: CPT

## 2020-05-29 RX ORDER — ACETAMINOPHEN 325 MG/1
650 TABLET ORAL ONCE
Status: CANCELLED | OUTPATIENT
Start: 2020-06-01

## 2020-05-29 RX ORDER — DIPHENHYDRAMINE HCL 25 MG
25 CAPSULE ORAL ONCE
Status: CANCELLED | OUTPATIENT
Start: 2020-06-01

## 2020-05-29 RX ORDER — ACETAMINOPHEN 325 MG/1
650 TABLET ORAL ONCE
Status: COMPLETED | OUTPATIENT
Start: 2020-05-29 | End: 2020-05-29

## 2020-05-29 RX ORDER — DIPHENHYDRAMINE HCL 25 MG
25 CAPSULE ORAL ONCE
Status: COMPLETED | OUTPATIENT
Start: 2020-05-29 | End: 2020-05-29

## 2020-05-29 RX ADMIN — ACETAMINOPHEN 650 MG: 325 TABLET ORAL at 13:18:00

## 2020-05-29 RX ADMIN — DIPHENHYDRAMINE HCL 25 MG: 25 MG CAPSULE ORAL at 13:18:00

## 2020-05-29 NOTE — PROGRESS NOTES
Pt here for IVIG.   Arrives Ambulating independently, accompanied by Self           Patient reports possible pregnancy since last therapy cycle: Not Applicable    Modifications in dose or schedule: No     Frequency of blood return and site check throughout

## 2020-06-26 ENCOUNTER — OFFICE VISIT (OUTPATIENT)
Dept: HEMATOLOGY/ONCOLOGY | Facility: HOSPITAL | Age: 55
End: 2020-06-26
Attending: INTERNAL MEDICINE
Payer: COMMERCIAL

## 2020-06-26 VITALS
HEART RATE: 83 BPM | BODY MASS INDEX: 31.83 KG/M2 | HEIGHT: 74.02 IN | WEIGHT: 248 LBS | DIASTOLIC BLOOD PRESSURE: 74 MMHG | OXYGEN SATURATION: 98 % | TEMPERATURE: 97 F | SYSTOLIC BLOOD PRESSURE: 112 MMHG | RESPIRATION RATE: 18 BRPM

## 2020-06-26 DIAGNOSIS — D80.1 HYPOGAMMAGLOBULINEMIA (HCC): ICD-10-CM

## 2020-06-26 DIAGNOSIS — D72.820 LYMPHOCYTOSIS: ICD-10-CM

## 2020-06-26 DIAGNOSIS — C91.10 CLL (CHRONIC LYMPHOCYTIC LEUKEMIA) (HCC): Primary | ICD-10-CM

## 2020-06-26 DIAGNOSIS — R79.89 BLOOD CREATININE INCREASED COMPARED WITH PRIOR MEASUREMENT: ICD-10-CM

## 2020-06-26 DIAGNOSIS — D69.6 THROMBOCYTOPENIA (HCC): ICD-10-CM

## 2020-06-26 DIAGNOSIS — R74.01 TRANSAMINITIS: ICD-10-CM

## 2020-06-26 PROCEDURE — 96365 THER/PROPH/DIAG IV INF INIT: CPT

## 2020-06-26 PROCEDURE — 36415 COLL VENOUS BLD VENIPUNCTURE: CPT

## 2020-06-26 PROCEDURE — 83615 LACTATE (LD) (LDH) ENZYME: CPT

## 2020-06-26 PROCEDURE — 85025 COMPLETE CBC W/AUTO DIFF WBC: CPT

## 2020-06-26 PROCEDURE — 99215 OFFICE O/P EST HI 40 MIN: CPT | Performed by: INTERNAL MEDICINE

## 2020-06-26 PROCEDURE — 80053 COMPREHEN METABOLIC PANEL: CPT

## 2020-06-26 PROCEDURE — 82784 ASSAY IGA/IGD/IGG/IGM EACH: CPT

## 2020-06-26 PROCEDURE — 96366 THER/PROPH/DIAG IV INF ADDON: CPT

## 2020-06-26 RX ORDER — ACETAMINOPHEN 325 MG/1
650 TABLET ORAL ONCE
Status: COMPLETED | OUTPATIENT
Start: 2020-06-26 | End: 2020-06-26

## 2020-06-26 RX ORDER — DIPHENHYDRAMINE HCL 25 MG
25 CAPSULE ORAL ONCE
Status: COMPLETED | OUTPATIENT
Start: 2020-06-26 | End: 2020-06-26

## 2020-06-26 RX ORDER — DIPHENHYDRAMINE HCL 25 MG
25 CAPSULE ORAL ONCE
Status: CANCELLED | OUTPATIENT
Start: 2020-07-01

## 2020-06-26 RX ORDER — ACETAMINOPHEN 325 MG/1
650 TABLET ORAL ONCE
Status: CANCELLED | OUTPATIENT
Start: 2020-07-01

## 2020-06-26 RX ADMIN — ACETAMINOPHEN 650 MG: 325 TABLET ORAL at 12:31:00

## 2020-06-26 RX ADMIN — DIPHENHYDRAMINE HCL 25 MG: 25 MG CAPSULE ORAL at 12:31:00

## 2020-06-26 NOTE — PROGRESS NOTES
Cancer Center Progress Note    Patient Name: Josefa Reese   YOB: 1965   Medical Record Number: [id number]   CSN: [id number]   Attending Physician: Bethanie Rivera M.D.    Referring Physician: Billie Desouza MD      Date of Visit: 6/26/2020 hypogammaglobulinemia. CT C/A/P showed splenomegaly with decrease in known nodes. Slowly improved and was discharged home 9/7/19.     - Resumed Ibrutinib 10/4/19      History of Present Illness:  He reports fatigue but otherwise doing ok.  Denies SOB, abdom TIME A DAY  (Patient taking differently: PT TAKING HALF TAB ), Disp: 30 tablet, Rfl: 5  •  Albuterol Sulfate HFA (PROAIR HFA) 108 (90 Base) MCG/ACT Inhalation Aero Soln, INHALE 2 PUFFS BY MOUTH EVERY SIX HOURS AS NEEDED for wheezing, Disp: 8.5 g, Rfl: 2  • file      Transportation needs:        Medical: Not on file        Non-medical: Not on file    Tobacco Use      Smoking status: Current Every Day Smoker        Packs/day: 1.00        Years: 20.00        Pack years: 20        Types: Cigarettes      [place] SHORTNESS OF BREATH  Pollen                  SHORTNESS OF BREATH     Review of Systems:  A 14-point ROS was done with pertinent positives and negative per the HPI    Vital Signs:  Height: 188 cm (6' 2.02\") (06/26 1148)  Weight: 112.5 kg (248 IMMUNOGLOBULIN G, QUANT    Collection Time: 06/26/20 11:38 AM   Result Value Ref Range    Immunoglobulin G 396 (L) 791-1,643 mg/dL   CBC W/ DIFFERENTIAL    Collection Time: 06/26/20 11:38 AM   Result Value Ref Range    WBC 10.3 4.0 - 11.0 x10(3) uL    RB effusion. Stable 5 mm right lower lobe pulmonary nodule, no change since June 2016, more than 3 years, benign     THORACIC AORTA:  No aneurysm or other acute process     MEDIASTINUM/AURA:  Stable right hilar lymph node 17 x 18 mm size. Donte Mcfarland      CARDIAC:  Imelda Lorenz CHEST/ABDOMEN/PELVIS:     BONES:  No acute abnormality.         =====  CONCLUSION:       1. Development of splenomegaly, with obvious change in the volume of the spleen when compared with a relatively recent CT scan of the abdomen from May 2019.  Idolina Spatz

## 2020-07-02 RX ORDER — IBRUTINIB 420 MG/1
TABLET, FILM COATED ORAL
Qty: 28 TABLET | Refills: 2 | Status: SHIPPED | OUTPATIENT
Start: 2020-07-02 | End: 2020-09-21

## 2020-07-03 ENCOUNTER — APPOINTMENT (OUTPATIENT)
Dept: LAB | Age: 55
End: 2020-07-03
Attending: INTERNAL MEDICINE
Payer: COMMERCIAL

## 2020-07-03 DIAGNOSIS — Z00.00 LABORATORY EXAMINATION ORDERED AS PART OF A ROUTINE GENERAL MEDICAL EXAMINATION: ICD-10-CM

## 2020-07-03 LAB
BILIRUB UR QL STRIP.AUTO: NEGATIVE
CHOLEST SMN-MCNC: 125 MG/DL (ref ?–200)
COLOR UR AUTO: YELLOW
COMPLEXED PSA SERPL-MCNC: 0.28 NG/ML (ref ?–4)
CREAT UR-SCNC: 173 MG/DL
EST. AVERAGE GLUCOSE BLD GHB EST-MCNC: 223 MG/DL (ref 68–126)
GLUCOSE UR STRIP.AUTO-MCNC: 150 MG/DL
HBA1C MFR BLD HPLC: 9.4 % (ref ?–5.7)
HDLC SERPL-MCNC: 37 MG/DL (ref 40–59)
KETONES UR STRIP.AUTO-MCNC: NEGATIVE MG/DL
LDLC SERPL CALC-MCNC: 52 MG/DL (ref ?–100)
LEUKOCYTE ESTERASE UR QL STRIP.AUTO: NEGATIVE
MICROALBUMIN UR-MCNC: 6.24 MG/DL
MICROALBUMIN/CREAT 24H UR-RTO: 36.1 UG/MG (ref ?–30)
NITRITE UR QL STRIP.AUTO: NEGATIVE
NONHDLC SERPL-MCNC: 88 MG/DL (ref ?–130)
PATIENT FASTING Y/N/NP: YES
PH UR STRIP.AUTO: 5 [PH] (ref 4.5–8)
PROT UR STRIP.AUTO-MCNC: NEGATIVE MG/DL
SP GR UR STRIP.AUTO: 1.02 (ref 1–1.03)
TRIGL SERPL-MCNC: 178 MG/DL (ref 30–149)
TSI SER-ACNC: 3.41 MIU/ML (ref 0.36–3.74)
UROBILINOGEN UR STRIP.AUTO-MCNC: <2 MG/DL
VLDLC SERPL CALC-MCNC: 36 MG/DL (ref 0–30)

## 2020-07-03 PROCEDURE — 80061 LIPID PANEL: CPT | Performed by: INTERNAL MEDICINE

## 2020-07-03 PROCEDURE — 36415 COLL VENOUS BLD VENIPUNCTURE: CPT | Performed by: INTERNAL MEDICINE

## 2020-07-03 PROCEDURE — 84443 ASSAY THYROID STIM HORMONE: CPT | Performed by: INTERNAL MEDICINE

## 2020-07-03 PROCEDURE — 81001 URINALYSIS AUTO W/SCOPE: CPT | Performed by: INTERNAL MEDICINE

## 2020-07-03 PROCEDURE — 84153 ASSAY OF PSA TOTAL: CPT | Performed by: INTERNAL MEDICINE

## 2020-07-03 PROCEDURE — 82570 ASSAY OF URINE CREATININE: CPT | Performed by: INTERNAL MEDICINE

## 2020-07-03 PROCEDURE — 83036 HEMOGLOBIN GLYCOSYLATED A1C: CPT | Performed by: INTERNAL MEDICINE

## 2020-07-03 PROCEDURE — 82043 UR ALBUMIN QUANTITATIVE: CPT | Performed by: INTERNAL MEDICINE

## 2020-07-10 ENCOUNTER — OFFICE VISIT (OUTPATIENT)
Dept: INTERNAL MEDICINE CLINIC | Facility: CLINIC | Age: 55
End: 2020-07-10
Payer: COMMERCIAL

## 2020-07-10 VITALS
SYSTOLIC BLOOD PRESSURE: 116 MMHG | WEIGHT: 244 LBS | HEIGHT: 74 IN | HEART RATE: 87 BPM | OXYGEN SATURATION: 97 % | DIASTOLIC BLOOD PRESSURE: 62 MMHG | TEMPERATURE: 98 F | BODY MASS INDEX: 31.32 KG/M2 | RESPIRATION RATE: 16 BRPM

## 2020-07-10 DIAGNOSIS — Z00.00 ANNUAL PHYSICAL EXAM: Primary | ICD-10-CM

## 2020-07-10 DIAGNOSIS — E11.9 TYPE 2 DIABETES MELLITUS WITHOUT COMPLICATION, WITHOUT LONG-TERM CURRENT USE OF INSULIN (HCC): ICD-10-CM

## 2020-07-10 PROCEDURE — 99396 PREV VISIT EST AGE 40-64: CPT | Performed by: INTERNAL MEDICINE

## 2020-07-10 RX ORDER — GLIMEPIRIDE 4 MG/1
4 TABLET ORAL 2 TIMES DAILY WITH MEALS
Qty: 180 TABLET | Refills: 0 | Status: SHIPPED | OUTPATIENT
Start: 2020-07-10 | End: 2020-09-08

## 2020-07-10 NOTE — PATIENT INSTRUCTIONS
Diabetes: Activity Tips    Being more active can help you manage your diabetes. The tips on this sheet can help you get the most from your exercise. They can also help you stay safe.    Staying active   It’s important for adults to spend less time sitting You may be told to plan your exercise for 1 to 2 hours after a meal. In most cases, you don’t need to eat while being active. Test your blood sugar before exercising if you take insulin or medicine that can cause low blood sugar.  And carry a fast-acting childers

## 2020-07-10 NOTE — PROGRESS NOTES
HPI:    Patient ID: Savage Kim is a 54year old male. HPI  Savage Kim is a 54year old male who presents for a complete physical exam.   HPI:   Pt complains of elevated glucose since start of imbruvica.  Seeing oncology  Denies cp or sob    Otherwise Current Outpatient Medications   Medication Sig Dispense Refill   • glimepiride (AMARYL) 4 MG Oral Tab Take 1 tablet (4 mg total) by mouth 2 (two) times daily with meals.  180 tablet 0   • IMBRUVICA 420 MG Oral Tab TAKE ONE TABLET BY MOUTH EVERY MORNING 2 asthma, unspecified    • High cholesterol    • Kidney stone    • Laboratory examination ordered as part of a routine general medical examination    • Type II or unspecified type diabetes mellitus without mention of complication, not stated as uncontrolled 31.33 kg/m²   Body mass index is 31.33 kg/m².    GENERAL: well developed, well nourished,in no apparent distress  SKIN: no rashes,no suspicious lesions  HEENT: atraumatic, normocephalic,ears and throat are clear  EYES:PERRLA, EOMI, normal optic disk,conjunc TWO TIMES A DAY  60 tablet 2   • METFORMIN  MG Oral Tab TAKE 1 TABLET BY MOUTH TWO TIMES A DAY  60 tablet 5   • SILDENAFIL CITRATE 100 MG Oral Tab TAKE 1 TABLET BY MOUTH ONE TIME A DAY as needed for erectile dysfunction  2 tablet 5   • BENAZEPRIL HC Oral Tab 180 tablet 0     Sig: Take 1 tablet (4 mg total) by mouth 2 (two) times daily with meals.        Imaging & Referrals:  None       EE#[id number]

## 2020-07-20 ENCOUNTER — TELEPHONE (OUTPATIENT)
Dept: HEMATOLOGY/ONCOLOGY | Facility: HOSPITAL | Age: 55
End: 2020-07-20

## 2020-07-20 DIAGNOSIS — E78.2 MIXED HYPERLIPIDEMIA: ICD-10-CM

## 2020-07-20 RX ORDER — ATORVASTATIN CALCIUM 40 MG/1
TABLET, FILM COATED ORAL
Qty: 30 TABLET | Refills: 0 | Status: SHIPPED | OUTPATIENT
Start: 2020-07-20 | End: 2020-08-20

## 2020-07-20 NOTE — TELEPHONE ENCOUNTER
Last time medication was refilled 1/20/2020  Quantity and number of refills 30 w/ 5  Last OV 7/10/2020  Next OV 1/15/2021

## 2020-07-20 NOTE — TELEPHONE ENCOUNTER
May from [address] called saying they are trying to set up a delivery for the patient's medication, but they have questions for a nurse.  Please call

## 2020-07-24 ENCOUNTER — OFFICE VISIT (OUTPATIENT)
Dept: HEMATOLOGY/ONCOLOGY | Facility: HOSPITAL | Age: 55
End: 2020-07-24
Attending: INTERNAL MEDICINE
Payer: COMMERCIAL

## 2020-07-24 VITALS — TEMPERATURE: 98 F | BODY MASS INDEX: 31.57 KG/M2 | HEIGHT: 74.02 IN | WEIGHT: 246 LBS

## 2020-07-24 DIAGNOSIS — C91.10 CLL (CHRONIC LYMPHOCYTIC LEUKEMIA) (HCC): Primary | ICD-10-CM

## 2020-07-24 PROCEDURE — 96365 THER/PROPH/DIAG IV INF INIT: CPT

## 2020-07-24 PROCEDURE — 96366 THER/PROPH/DIAG IV INF ADDON: CPT

## 2020-07-24 RX ORDER — DIPHENHYDRAMINE HCL 25 MG
25 CAPSULE ORAL ONCE
Status: CANCELLED | OUTPATIENT
Start: 2020-08-01

## 2020-07-24 RX ORDER — ACETAMINOPHEN 325 MG/1
650 TABLET ORAL ONCE
Status: CANCELLED | OUTPATIENT
Start: 2020-08-01

## 2020-07-24 RX ORDER — DIPHENHYDRAMINE HCL 25 MG
25 CAPSULE ORAL ONCE
Status: COMPLETED | OUTPATIENT
Start: 2020-07-24 | End: 2020-07-24

## 2020-07-24 RX ORDER — ACETAMINOPHEN 325 MG/1
650 TABLET ORAL ONCE
Status: COMPLETED | OUTPATIENT
Start: 2020-07-24 | End: 2020-07-24

## 2020-07-24 RX ADMIN — DIPHENHYDRAMINE HCL 25 MG: 25 MG CAPSULE ORAL at 13:03:00

## 2020-07-24 RX ADMIN — ACETAMINOPHEN 650 MG: 325 TABLET ORAL at 13:03:00

## 2020-07-24 NOTE — PROGRESS NOTES
Education Record    Learner:  Patient    Disease / Diagnosis:  hypogammaglobulinemia    Barriers / Limitations:  None   Comments:    Method:  Brief focused   Comments:    General Topics:  Medication, Procedure, Side effects and symptom management and Plan

## 2020-07-30 RX ORDER — FLUCONAZOLE 100 MG/1
TABLET ORAL
Qty: 30 TABLET | Refills: 0 | Status: SHIPPED | OUTPATIENT
Start: 2020-07-30 | End: 2020-08-20

## 2020-07-30 RX ORDER — ACYCLOVIR 400 MG/1
TABLET ORAL
Qty: 60 TABLET | Refills: 0 | Status: SHIPPED | OUTPATIENT
Start: 2020-07-30 | End: 2020-08-20

## 2020-08-07 ENCOUNTER — TELEPHONE (OUTPATIENT)
Dept: HEMATOLOGY/ONCOLOGY | Facility: HOSPITAL | Age: 55
End: 2020-08-07

## 2020-08-07 NOTE — TELEPHONE ENCOUNTER
[facility] asking if our office is ready for another shipment for this patient's medication. She says she also has other questions in addition to this.  Please call

## 2020-08-19 DIAGNOSIS — E78.2 MIXED HYPERLIPIDEMIA: ICD-10-CM

## 2020-08-20 RX ORDER — FLUCONAZOLE 100 MG/1
TABLET ORAL
Qty: 30 TABLET | Refills: 0 | Status: SHIPPED | OUTPATIENT
Start: 2020-08-20 | End: 2020-09-30

## 2020-08-20 RX ORDER — ACYCLOVIR 400 MG/1
TABLET ORAL
Qty: 60 TABLET | Refills: 0 | Status: SHIPPED | OUTPATIENT
Start: 2020-08-20 | End: 2020-09-30

## 2020-08-20 RX ORDER — ATORVASTATIN CALCIUM 40 MG/1
TABLET, FILM COATED ORAL
Qty: 30 TABLET | Refills: 2 | Status: SHIPPED | OUTPATIENT
Start: 2020-08-20 | End: 2020-11-19

## 2020-08-21 ENCOUNTER — OFFICE VISIT (OUTPATIENT)
Dept: HEMATOLOGY/ONCOLOGY | Facility: HOSPITAL | Age: 55
End: 2020-08-21
Attending: INTERNAL MEDICINE
Payer: COMMERCIAL

## 2020-08-21 VITALS
DIASTOLIC BLOOD PRESSURE: 72 MMHG | TEMPERATURE: 97 F | HEART RATE: 81 BPM | BODY MASS INDEX: 31.62 KG/M2 | SYSTOLIC BLOOD PRESSURE: 131 MMHG | HEIGHT: 74.02 IN | WEIGHT: 246.38 LBS | OXYGEN SATURATION: 98 % | RESPIRATION RATE: 18 BRPM

## 2020-08-21 DIAGNOSIS — C91.10 CLL (CHRONIC LYMPHOCYTIC LEUKEMIA) (HCC): Primary | ICD-10-CM

## 2020-08-21 PROCEDURE — 96365 THER/PROPH/DIAG IV INF INIT: CPT

## 2020-08-21 PROCEDURE — 96366 THER/PROPH/DIAG IV INF ADDON: CPT

## 2020-08-21 RX ORDER — ACETAMINOPHEN 325 MG/1
650 TABLET ORAL ONCE
Status: COMPLETED | OUTPATIENT
Start: 2020-08-21 | End: 2020-08-21

## 2020-08-21 RX ORDER — DIPHENHYDRAMINE HCL 25 MG
25 CAPSULE ORAL ONCE
Status: CANCELLED | OUTPATIENT
Start: 2020-09-01

## 2020-08-21 RX ORDER — ACETAMINOPHEN 325 MG/1
650 TABLET ORAL ONCE
Status: CANCELLED | OUTPATIENT
Start: 2020-09-01

## 2020-08-21 RX ORDER — DIPHENHYDRAMINE HCL 25 MG
25 CAPSULE ORAL ONCE
Status: COMPLETED | OUTPATIENT
Start: 2020-08-21 | End: 2020-08-21

## 2020-08-21 RX ADMIN — ACETAMINOPHEN 650 MG: 325 TABLET ORAL at 08:15:00

## 2020-08-21 RX ADMIN — DIPHENHYDRAMINE HCL 25 MG: 25 MG CAPSULE ORAL at 08:15:00

## 2020-09-08 DIAGNOSIS — E11.9 TYPE 2 DIABETES MELLITUS WITHOUT COMPLICATION, WITHOUT LONG-TERM CURRENT USE OF INSULIN (HCC): ICD-10-CM

## 2020-09-08 RX ORDER — GLIMEPIRIDE 4 MG/1
4 TABLET ORAL 2 TIMES DAILY WITH MEALS
Qty: 180 TABLET | Refills: 0 | Status: SHIPPED | OUTPATIENT
Start: 2020-09-08 | End: 2020-11-19

## 2020-09-08 NOTE — TELEPHONE ENCOUNTER
Last time medication was refilled 7/10/2020  Quantity and number of refills 180 w/0  Last OV 7/10/2020  Next OV 1/15/2021

## 2020-09-08 NOTE — TELEPHONE ENCOUNTER
Ricardo Lopez  Ph: [phone]  Fx: [phone]    Refill Req:   glimepiride (AMARYL) 4 MG Oral Tab    Fax in triage

## 2020-09-10 DIAGNOSIS — N52.9 ERECTILE DYSFUNCTION, UNSPECIFIED ERECTILE DYSFUNCTION TYPE: ICD-10-CM

## 2020-09-10 RX ORDER — SILDENAFIL 100 MG/1
TABLET, FILM COATED ORAL
Qty: 2 TABLET | Refills: 0 | Status: SHIPPED | OUTPATIENT
Start: 2020-09-10 | End: 2020-09-30

## 2020-09-10 NOTE — TELEPHONE ENCOUNTER
Last time medication was refilled 4/29/2020  Quantity and number of refills 2 w/ 5   Last OV 7/10/2020  Next OV 1/15/2021

## 2020-09-18 ENCOUNTER — OFFICE VISIT (OUTPATIENT)
Dept: HEMATOLOGY/ONCOLOGY | Facility: HOSPITAL | Age: 55
End: 2020-09-18
Attending: INTERNAL MEDICINE
Payer: COMMERCIAL

## 2020-09-18 VITALS
OXYGEN SATURATION: 100 % | DIASTOLIC BLOOD PRESSURE: 76 MMHG | RESPIRATION RATE: 16 BRPM | HEART RATE: 82 BPM | SYSTOLIC BLOOD PRESSURE: 126 MMHG | WEIGHT: 242 LBS | TEMPERATURE: 97 F | HEIGHT: 74.02 IN | BODY MASS INDEX: 31.06 KG/M2

## 2020-09-18 DIAGNOSIS — D69.6 THROMBOCYTOPENIA (HCC): ICD-10-CM

## 2020-09-18 DIAGNOSIS — R74.01 TRANSAMINITIS: ICD-10-CM

## 2020-09-18 DIAGNOSIS — C91.10 CLL (CHRONIC LYMPHOCYTIC LEUKEMIA) (HCC): Primary | ICD-10-CM

## 2020-09-18 DIAGNOSIS — D72.820 LYMPHOCYTOSIS: ICD-10-CM

## 2020-09-18 DIAGNOSIS — D80.1 HYPOGAMMAGLOBULINEMIA (HCC): ICD-10-CM

## 2020-09-18 DIAGNOSIS — R79.89 BLOOD CREATININE INCREASED COMPARED WITH PRIOR MEASUREMENT: ICD-10-CM

## 2020-09-18 LAB
ALBUMIN SERPL-MCNC: 4.1 G/DL (ref 3.4–5)
ALBUMIN/GLOB SERPL: 1.3 {RATIO} (ref 1–2)
ALP LIVER SERPL-CCNC: 66 U/L (ref 45–117)
ALT SERPL-CCNC: 66 U/L (ref 16–61)
ANION GAP SERPL CALC-SCNC: 3 MMOL/L (ref 0–18)
AST SERPL-CCNC: 24 U/L (ref 15–37)
BASOPHILS # BLD AUTO: 0.08 X10(3) UL (ref 0–0.2)
BASOPHILS NFR BLD AUTO: 0.9 %
BILIRUB SERPL-MCNC: 0.6 MG/DL (ref 0.1–2)
BUN BLD-MCNC: 13 MG/DL (ref 7–18)
BUN/CREAT SERPL: 12.7 (ref 10–20)
CALCIUM BLD-MCNC: 9.6 MG/DL (ref 8.5–10.1)
CHLORIDE SERPL-SCNC: 103 MMOL/L (ref 98–112)
CO2 SERPL-SCNC: 28 MMOL/L (ref 21–32)
CREAT BLD-MCNC: 1.02 MG/DL (ref 0.7–1.3)
DEPRECATED RDW RBC AUTO: 39.1 FL (ref 35.1–46.3)
EOSINOPHIL # BLD AUTO: 0.11 X10(3) UL (ref 0–0.7)
EOSINOPHIL NFR BLD AUTO: 1.2 %
ERYTHROCYTE [DISTWIDTH] IN BLOOD BY AUTOMATED COUNT: 11.9 % (ref 11–15)
GLOBULIN PLAS-MCNC: 3.1 G/DL (ref 2.8–4.4)
GLUCOSE BLD-MCNC: 266 MG/DL (ref 70–99)
HCT VFR BLD AUTO: 46.6 % (ref 39–53)
HGB BLD-MCNC: 15.3 G/DL (ref 13–17.5)
IMM GRANULOCYTES # BLD AUTO: 0.02 X10(3) UL (ref 0–1)
IMM GRANULOCYTES NFR BLD: 0.2 %
IMMUNOGLOBULIN PNL SER-MCNC: 451 MG/DL (ref 791–1643)
LDH SERPL L TO P-CCNC: 130 U/L
LYMPHOCYTES # BLD AUTO: 4.22 X10(3) UL (ref 1–4)
LYMPHOCYTES NFR BLD AUTO: 45.3 %
M PROTEIN MFR SERPL ELPH: 7.2 G/DL (ref 6.4–8.2)
MCH RBC QN AUTO: 29.7 PG (ref 26–34)
MCHC RBC AUTO-ENTMCNC: 32.8 G/DL (ref 31–37)
MCV RBC AUTO: 90.3 FL (ref 80–100)
MONOCYTES # BLD AUTO: 0.56 X10(3) UL (ref 0.1–1)
MONOCYTES NFR BLD AUTO: 6 %
NEUTROPHILS # BLD AUTO: 4.33 X10 (3) UL (ref 1.5–7.7)
NEUTROPHILS # BLD AUTO: 4.33 X10(3) UL (ref 1.5–7.7)
NEUTROPHILS NFR BLD AUTO: 46.4 %
OSMOLALITY SERPL CALC.SUM OF ELEC: 287 MOSM/KG (ref 275–295)
PLATELET # BLD AUTO: 135 10(3)UL (ref 150–450)
POTASSIUM SERPL-SCNC: 4.3 MMOL/L (ref 3.5–5.1)
RBC # BLD AUTO: 5.16 X10(6)UL (ref 4.3–5.7)
SODIUM SERPL-SCNC: 134 MMOL/L (ref 136–145)
WBC # BLD AUTO: 9.3 X10(3) UL (ref 4–11)

## 2020-09-18 PROCEDURE — 96366 THER/PROPH/DIAG IV INF ADDON: CPT

## 2020-09-18 PROCEDURE — 36415 COLL VENOUS BLD VENIPUNCTURE: CPT

## 2020-09-18 PROCEDURE — 96365 THER/PROPH/DIAG IV INF INIT: CPT

## 2020-09-18 PROCEDURE — 99215 OFFICE O/P EST HI 40 MIN: CPT | Performed by: INTERNAL MEDICINE

## 2020-09-18 PROCEDURE — 85025 COMPLETE CBC W/AUTO DIFF WBC: CPT

## 2020-09-18 PROCEDURE — 83615 LACTATE (LD) (LDH) ENZYME: CPT

## 2020-09-18 PROCEDURE — 82784 ASSAY IGA/IGD/IGG/IGM EACH: CPT

## 2020-09-18 PROCEDURE — 80053 COMPREHEN METABOLIC PANEL: CPT

## 2020-09-18 RX ORDER — DIPHENHYDRAMINE HCL 25 MG
25 CAPSULE ORAL ONCE
Status: CANCELLED | OUTPATIENT
Start: 2020-10-01

## 2020-09-18 RX ORDER — DIPHENHYDRAMINE HCL 25 MG
25 CAPSULE ORAL ONCE
Status: COMPLETED | OUTPATIENT
Start: 2020-09-18 | End: 2020-09-18

## 2020-09-18 RX ORDER — ACETAMINOPHEN 325 MG/1
650 TABLET ORAL ONCE
Status: COMPLETED | OUTPATIENT
Start: 2020-09-18 | End: 2020-09-18

## 2020-09-18 RX ORDER — ACETAMINOPHEN 325 MG/1
650 TABLET ORAL ONCE
Status: CANCELLED | OUTPATIENT
Start: 2020-10-01

## 2020-09-18 RX ADMIN — DIPHENHYDRAMINE HCL 25 MG: 25 MG CAPSULE ORAL at 08:23:00

## 2020-09-18 RX ADMIN — ACETAMINOPHEN 650 MG: 325 TABLET ORAL at 08:23:00

## 2020-09-18 NOTE — PROGRESS NOTES
Education Record    Learner:  Patient    Disease / Elder Soham     Barriers / Limitations:  None   Comments:    Method:  Discussion   Comments:    General Topics:  Plan of care reviewed   Comments:    Outcome:  Shows understanding   Com

## 2020-09-18 NOTE — PROGRESS NOTES
Cancer Center Progress Note    Patient Name: Chanda Harmon   YOB: 1965   Medical Record Number: [id number]   CSN: [id number]   Attending Physician: Kayla Barroso M.D.    Referring Physician: Darion Holloway MD      Date of Visit: 9/18/2020 splenomegaly with decrease in known nodes. Slowly improved and was discharged home 9/7/19.     - Resumed Ibrutinib 10/4/19      History of Present Illness:  Doing fairly well. He reports fatigue which he attributes to work. Doing a lot of overtime.  Denies BY MOUTH ONE TIME A DAY  (Patient taking differently: PT TAKING HALF TAB ), Disp: 30 tablet, Rfl: 5  •  Albuterol Sulfate HFA (PROAIR HFA) 108 (90 Base) MCG/ACT Inhalation Aero Soln, INHALE 2 PUFFS BY MOUTH EVERY SIX HOURS AS NEEDED for wheezing, Disp: 8.5 Inability: Not on file      Transportation needs:        Medical: Not on file        Non-medical: Not on file    Tobacco Use      Smoking status: Current Every Day Smoker        Years: 20.00        Types: Pipe      Smokeless tobacco: Never Used      Tobacc BREATH  Pollen                  SHORTNESS OF BREATH     Review of Systems:  A 14-point ROS was done with pertinent positives and negative per the HPI    Vital Signs:  Height: 188 cm (6' 2.02\") (09/18 9968)  Weight: 109.8 kg (242 lb) (09/18 0821)  BSA (Mejia 8:09 AM   Result Value Ref Range     87 - 241 U/L   IMMUNOGLOBULIN G, QUANT    Collection Time: 09/18/20  8:09 AM   Result Value Ref Range    Immunoglobulin G 451 (L) 791-1,643 mg/dL   CBC W/ DIFFERENTIAL    Collection Time: 09/18/20  8:09 AM   Resu lobe atelectasis, greater on the left, without pleural effusion.   Stable 5 mm right lower lobe pulmonary nodule, no change since June 2016, more than 3 years, benign     THORACIC AORTA:  No aneurysm or other acute process     MEDIASTINUM/AURA:  Stable righ process. OTHER:  None. SKELETAL STRUCTURES IN THE CHEST/ABDOMEN/PELVIS:     BONES:  No acute abnormality.         =====  CONCLUSION:       1.  Development of splenomegaly, with obvious change in the volume of the spleen when compared with a relativ anxiety or depression. We discussed issues of distress, coping difficulties and social support systems and currently there are no active problems.       MD Dian Segal [facility]

## 2020-09-30 DIAGNOSIS — I10 ESSENTIAL HYPERTENSION: ICD-10-CM

## 2020-09-30 DIAGNOSIS — L65.9 ALOPECIA: ICD-10-CM

## 2020-09-30 DIAGNOSIS — N52.9 ERECTILE DYSFUNCTION, UNSPECIFIED ERECTILE DYSFUNCTION TYPE: ICD-10-CM

## 2020-09-30 RX ORDER — BENAZEPRIL HYDROCHLORIDE 10 MG/1
TABLET ORAL
Qty: 90 TABLET | Refills: 0 | Status: SHIPPED | OUTPATIENT
Start: 2020-09-30 | End: 2021-01-17

## 2020-09-30 RX ORDER — FINASTERIDE 5 MG/1
TABLET, FILM COATED ORAL
Qty: 30 TABLET | Refills: 0 | Status: SHIPPED | OUTPATIENT
Start: 2020-09-30 | End: 2020-11-19

## 2020-09-30 RX ORDER — SILDENAFIL 100 MG/1
TABLET, FILM COATED ORAL
Qty: 10 TABLET | Refills: 0 | Status: SHIPPED | OUTPATIENT
Start: 2020-09-30 | End: 2020-11-19

## 2020-09-30 RX ORDER — FLUCONAZOLE 100 MG/1
TABLET ORAL
Qty: 30 TABLET | Refills: 2 | Status: SHIPPED | OUTPATIENT
Start: 2020-09-30 | End: 2020-12-15

## 2020-09-30 RX ORDER — ACYCLOVIR 400 MG/1
TABLET ORAL
Qty: 60 TABLET | Refills: 2 | Status: SHIPPED | OUTPATIENT
Start: 2020-09-30 | End: 2020-12-21

## 2020-10-12 ENCOUNTER — TELEPHONE (OUTPATIENT)
Dept: HEMATOLOGY/ONCOLOGY | Facility: HOSPITAL | Age: 55
End: 2020-10-12

## 2020-10-14 ENCOUNTER — TELEPHONE (OUTPATIENT)
Dept: HEMATOLOGY/ONCOLOGY | Facility: HOSPITAL | Age: 55
End: 2020-10-14

## 2020-10-14 NOTE — TELEPHONE ENCOUNTER
Danielle Burkett called asking to speak with [facility]. He says it is regarding his gammagard. He says it is being renewed, but the pharmacy said they were not sure they would have it at the hospital by Friday.  Please call

## 2020-10-15 ENCOUNTER — TELEPHONE (OUTPATIENT)
Dept: HEMATOLOGY/ONCOLOGY | Facility: HOSPITAL | Age: 55
End: 2020-10-15

## 2020-10-15 NOTE — TELEPHONE ENCOUNTER
Patient called to notify him that insurance review is still in process and patient cannot receive drug tomorrow. Per pharmacist, Yoli Puentes, the drug should be received on Monday or Tuesday. Patient rescheduled to next Friday (10/23/2020).  Patient verbalized und

## 2020-10-15 NOTE — TELEPHONE ENCOUNTER
[facility] At [address] [place] [address] is calling to say that this patient may miss his dose for his infusion on 10/16/20 medication is still under insurance review with prior auth.  Krishna Necessary transferred the call to [place] said that [facility] is working on sending an [address]

## 2020-10-15 NOTE — TELEPHONE ENCOUNTER
Sara Contreras called to let us know that the pt's Gammagard liquid for his infusion requires a PA through his pharmacy benefits. His current shipment did not go out so the PA would need to be completed this week.  [facility] states that they can

## 2020-10-16 ENCOUNTER — TELEPHONE (OUTPATIENT)
Dept: HEMATOLOGY/ONCOLOGY | Facility: HOSPITAL | Age: 55
End: 2020-10-16

## 2020-10-16 ENCOUNTER — APPOINTMENT (OUTPATIENT)
Dept: HEMATOLOGY/ONCOLOGY | Facility: HOSPITAL | Age: 55
End: 2020-10-16
Attending: INTERNAL MEDICINE
Payer: COMMERCIAL

## 2020-10-16 NOTE — TELEPHONE ENCOUNTER
After hours message from [facility]/ [facility] that patient's inj has been approved as of yesterday afternoon. Call back number provided [phone].

## 2020-10-23 ENCOUNTER — TELEPHONE (OUTPATIENT)
Dept: INTERNAL MEDICINE CLINIC | Facility: CLINIC | Age: 55
End: 2020-10-23

## 2020-10-23 ENCOUNTER — OFFICE VISIT (OUTPATIENT)
Dept: HEMATOLOGY/ONCOLOGY | Facility: HOSPITAL | Age: 55
End: 2020-10-23
Attending: INTERNAL MEDICINE
Payer: COMMERCIAL

## 2020-10-23 VITALS
OXYGEN SATURATION: 98 % | WEIGHT: 246 LBS | RESPIRATION RATE: 18 BRPM | BODY MASS INDEX: 31.57 KG/M2 | HEIGHT: 74.02 IN | TEMPERATURE: 96 F | SYSTOLIC BLOOD PRESSURE: 128 MMHG | DIASTOLIC BLOOD PRESSURE: 84 MMHG | HEART RATE: 66 BPM

## 2020-10-23 DIAGNOSIS — E11.9 TYPE 2 DIABETES MELLITUS WITHOUT COMPLICATION, WITHOUT LONG-TERM CURRENT USE OF INSULIN (HCC): Primary | ICD-10-CM

## 2020-10-23 DIAGNOSIS — C91.10 CLL (CHRONIC LYMPHOCYTIC LEUKEMIA) (HCC): Primary | ICD-10-CM

## 2020-10-23 DIAGNOSIS — D80.1 HYPOGAMMAGLOBULINEMIA (HCC): ICD-10-CM

## 2020-10-23 DIAGNOSIS — E11.9 TYPE 2 DIABETES MELLITUS WITHOUT COMPLICATION, WITHOUT LONG-TERM CURRENT USE OF INSULIN (HCC): ICD-10-CM

## 2020-10-23 PROCEDURE — 96365 THER/PROPH/DIAG IV INF INIT: CPT

## 2020-10-23 PROCEDURE — 96366 THER/PROPH/DIAG IV INF ADDON: CPT

## 2020-10-23 PROCEDURE — 83615 LACTATE (LD) (LDH) ENZYME: CPT

## 2020-10-23 PROCEDURE — 83036 HEMOGLOBIN GLYCOSYLATED A1C: CPT

## 2020-10-23 PROCEDURE — 85025 COMPLETE CBC W/AUTO DIFF WBC: CPT

## 2020-10-23 PROCEDURE — 80053 COMPREHEN METABOLIC PANEL: CPT

## 2020-10-23 PROCEDURE — 82784 ASSAY IGA/IGD/IGG/IGM EACH: CPT

## 2020-10-23 PROCEDURE — 36415 COLL VENOUS BLD VENIPUNCTURE: CPT

## 2020-10-23 RX ORDER — DIPHENHYDRAMINE HCL 25 MG
25 CAPSULE ORAL ONCE
Status: CANCELLED | OUTPATIENT
Start: 2020-11-01

## 2020-10-23 RX ORDER — ACETAMINOPHEN 325 MG/1
650 TABLET ORAL ONCE
Status: COMPLETED | OUTPATIENT
Start: 2020-10-23 | End: 2020-10-23

## 2020-10-23 RX ORDER — DIPHENHYDRAMINE HCL 25 MG
25 CAPSULE ORAL ONCE
Status: COMPLETED | OUTPATIENT
Start: 2020-10-23 | End: 2020-10-23

## 2020-10-23 RX ORDER — ACETAMINOPHEN 325 MG/1
650 TABLET ORAL ONCE
Status: CANCELLED | OUTPATIENT
Start: 2020-11-01

## 2020-10-23 RX ADMIN — DIPHENHYDRAMINE HCL 25 MG: 25 MG CAPSULE ORAL at 08:52:00

## 2020-10-23 RX ADMIN — ACETAMINOPHEN 650 MG: 325 TABLET ORAL at 08:52:00

## 2020-10-23 NOTE — TELEPHONE ENCOUNTER
Results, recommendations and repeat testing d/w pt. He expressed understanding. Rx sent and order placed.

## 2020-10-23 NOTE — TELEPHONE ENCOUNTER
----- Message from Yasmine Flores MD sent at 10/23/2020 11:43 AM CDT -----  Diabetes is not controlled.    Add farxiga 5 mg daily and recheck a1c in 2 m

## 2020-10-23 NOTE — PROGRESS NOTES
Education Record    Learner:  Patient    Disease / J Luis Regan    Barriers / Limitations:  None   Comments:    Method:  Discussion   Comments:    General Topics:  Plan of care reviewed   Comments:    Outcome:  Shows understanding   Comm

## 2020-10-29 ENCOUNTER — TELEPHONE (OUTPATIENT)
Dept: HEMATOLOGY/ONCOLOGY | Facility: HOSPITAL | Age: 55
End: 2020-10-29

## 2020-11-06 ENCOUNTER — TELEPHONE (OUTPATIENT)
Dept: HEMATOLOGY/ONCOLOGY | Facility: HOSPITAL | Age: 55
End: 2020-11-06

## 2020-11-06 NOTE — TELEPHONE ENCOUNTER
[address] [facility] at [phone] is calling for a refill on Gammagard 10% infusioon

## 2020-11-18 DIAGNOSIS — N52.9 ERECTILE DYSFUNCTION, UNSPECIFIED ERECTILE DYSFUNCTION TYPE: ICD-10-CM

## 2020-11-18 DIAGNOSIS — L65.9 ALOPECIA: ICD-10-CM

## 2020-11-18 DIAGNOSIS — E11.9 TYPE 2 DIABETES MELLITUS WITHOUT COMPLICATION, WITHOUT LONG-TERM CURRENT USE OF INSULIN (HCC): ICD-10-CM

## 2020-11-18 DIAGNOSIS — E78.2 MIXED HYPERLIPIDEMIA: ICD-10-CM

## 2020-11-19 RX ORDER — SILDENAFIL 100 MG/1
TABLET, FILM COATED ORAL
Qty: 10 TABLET | Refills: 0 | Status: SHIPPED | OUTPATIENT
Start: 2020-11-19 | End: 2020-12-15

## 2020-11-19 RX ORDER — GLIMEPIRIDE 4 MG/1
TABLET ORAL
Qty: 180 TABLET | Refills: 0 | Status: SHIPPED | OUTPATIENT
Start: 2020-11-19 | End: 2021-02-16

## 2020-11-19 RX ORDER — ATORVASTATIN CALCIUM 40 MG/1
TABLET, FILM COATED ORAL
Qty: 30 TABLET | Refills: 3 | Status: SHIPPED | OUTPATIENT
Start: 2020-11-19 | End: 2021-03-18

## 2020-11-19 RX ORDER — FINASTERIDE 5 MG/1
TABLET, FILM COATED ORAL
Qty: 30 TABLET | Refills: 0 | Status: SHIPPED | OUTPATIENT
Start: 2020-11-19 | End: 2020-12-15

## 2020-11-20 ENCOUNTER — OFFICE VISIT (OUTPATIENT)
Dept: HEMATOLOGY/ONCOLOGY | Facility: HOSPITAL | Age: 55
End: 2020-11-20
Attending: INTERNAL MEDICINE
Payer: COMMERCIAL

## 2020-11-20 VITALS
HEIGHT: 74.02 IN | TEMPERATURE: 98 F | OXYGEN SATURATION: 99 % | SYSTOLIC BLOOD PRESSURE: 145 MMHG | WEIGHT: 243.81 LBS | HEART RATE: 77 BPM | DIASTOLIC BLOOD PRESSURE: 82 MMHG | RESPIRATION RATE: 16 BRPM | BODY MASS INDEX: 31.29 KG/M2

## 2020-11-20 DIAGNOSIS — C91.10 CLL (CHRONIC LYMPHOCYTIC LEUKEMIA) (HCC): Primary | ICD-10-CM

## 2020-11-20 DIAGNOSIS — J45.40 MODERATE PERSISTENT ASTHMA WITHOUT COMPLICATION: ICD-10-CM

## 2020-11-20 PROCEDURE — 83615 LACTATE (LD) (LDH) ENZYME: CPT

## 2020-11-20 PROCEDURE — 96365 THER/PROPH/DIAG IV INF INIT: CPT

## 2020-11-20 PROCEDURE — 82784 ASSAY IGA/IGD/IGG/IGM EACH: CPT

## 2020-11-20 PROCEDURE — 80053 COMPREHEN METABOLIC PANEL: CPT

## 2020-11-20 PROCEDURE — 85025 COMPLETE CBC W/AUTO DIFF WBC: CPT

## 2020-11-20 PROCEDURE — 36415 COLL VENOUS BLD VENIPUNCTURE: CPT

## 2020-11-20 PROCEDURE — 96366 THER/PROPH/DIAG IV INF ADDON: CPT

## 2020-11-20 RX ORDER — ACETAMINOPHEN 325 MG/1
650 TABLET ORAL ONCE
Status: COMPLETED | OUTPATIENT
Start: 2020-11-20 | End: 2020-11-20

## 2020-11-20 RX ORDER — DIPHENHYDRAMINE HCL 25 MG
25 CAPSULE ORAL ONCE
Status: COMPLETED | OUTPATIENT
Start: 2020-11-20 | End: 2020-11-20

## 2020-11-20 RX ORDER — FLUTICASONE PROPIONATE 250 UG/1
POWDER, METERED RESPIRATORY (INHALATION)
Qty: 60 EACH | Refills: 5 | Status: SHIPPED | OUTPATIENT
Start: 2020-11-20 | End: 2021-10-20

## 2020-11-20 RX ORDER — ACETAMINOPHEN 325 MG/1
650 TABLET ORAL ONCE
Status: CANCELLED | OUTPATIENT
Start: 2020-12-01

## 2020-11-20 RX ORDER — DIPHENHYDRAMINE HCL 25 MG
25 CAPSULE ORAL ONCE
Status: CANCELLED | OUTPATIENT
Start: 2020-12-01

## 2020-11-20 RX ADMIN — ACETAMINOPHEN 650 MG: 325 TABLET ORAL at 08:35:00

## 2020-11-20 RX ADMIN — DIPHENHYDRAMINE HCL 25 MG: 25 MG CAPSULE ORAL at 08:35:00

## 2020-11-20 NOTE — PROGRESS NOTES
Education Record    Learner:  Patient    Disease / Diagnosis: IVIG infusion monthly.      Barriers / Limitations:  None   Comments:    Method:  Brief focused   Comments:    General Topics:  Plan of care reviewed   Comments:    Outcome:  Shows understanding

## 2020-12-01 ENCOUNTER — APPOINTMENT (OUTPATIENT)
Dept: HEMATOLOGY/ONCOLOGY | Facility: HOSPITAL | Age: 55
End: 2020-12-01
Attending: INTERNAL MEDICINE
Payer: COMMERCIAL

## 2020-12-11 ENCOUNTER — TELEPHONE (OUTPATIENT)
Dept: HEMATOLOGY/ONCOLOGY | Facility: HOSPITAL | Age: 55
End: 2020-12-11

## 2020-12-15 DIAGNOSIS — E11.9 TYPE 2 DIABETES MELLITUS WITHOUT COMPLICATION, WITHOUT LONG-TERM CURRENT USE OF INSULIN (HCC): ICD-10-CM

## 2020-12-15 DIAGNOSIS — N52.9 ERECTILE DYSFUNCTION, UNSPECIFIED ERECTILE DYSFUNCTION TYPE: ICD-10-CM

## 2020-12-15 DIAGNOSIS — L65.9 ALOPECIA: ICD-10-CM

## 2020-12-15 RX ORDER — SILDENAFIL 100 MG/1
TABLET, FILM COATED ORAL
Qty: 10 TABLET | Refills: 0 | Status: SHIPPED | OUTPATIENT
Start: 2020-12-15 | End: 2021-06-16

## 2020-12-15 RX ORDER — FLUCONAZOLE 100 MG/1
TABLET ORAL
Qty: 30 TABLET | Refills: 0 | Status: SHIPPED | OUTPATIENT
Start: 2020-12-15 | End: 2021-01-18

## 2020-12-15 RX ORDER — FINASTERIDE 5 MG/1
TABLET, FILM COATED ORAL
Qty: 30 TABLET | Refills: 0 | Status: SHIPPED | OUTPATIENT
Start: 2020-12-15 | End: 2021-03-12

## 2020-12-18 ENCOUNTER — OFFICE VISIT (OUTPATIENT)
Dept: HEMATOLOGY/ONCOLOGY | Facility: HOSPITAL | Age: 55
End: 2020-12-18
Attending: INTERNAL MEDICINE
Payer: COMMERCIAL

## 2020-12-18 VITALS
RESPIRATION RATE: 16 BRPM | WEIGHT: 239.81 LBS | DIASTOLIC BLOOD PRESSURE: 74 MMHG | HEART RATE: 71 BPM | TEMPERATURE: 97 F | SYSTOLIC BLOOD PRESSURE: 146 MMHG | BODY MASS INDEX: 30.78 KG/M2 | HEIGHT: 74.02 IN | OXYGEN SATURATION: 98 %

## 2020-12-18 DIAGNOSIS — D72.820 LYMPHOCYTOSIS: ICD-10-CM

## 2020-12-18 DIAGNOSIS — D80.1 HYPOGAMMAGLOBULINEMIA (HCC): ICD-10-CM

## 2020-12-18 DIAGNOSIS — E11.9 TYPE 2 DIABETES MELLITUS WITHOUT COMPLICATION, WITHOUT LONG-TERM CURRENT USE OF INSULIN (HCC): ICD-10-CM

## 2020-12-18 DIAGNOSIS — R74.01 TRANSAMINITIS: ICD-10-CM

## 2020-12-18 DIAGNOSIS — C91.10 CLL (CHRONIC LYMPHOCYTIC LEUKEMIA) (HCC): Primary | ICD-10-CM

## 2020-12-18 DIAGNOSIS — D69.6 THROMBOCYTOPENIA (HCC): ICD-10-CM

## 2020-12-18 PROCEDURE — 83036 HEMOGLOBIN GLYCOSYLATED A1C: CPT

## 2020-12-18 PROCEDURE — 96366 THER/PROPH/DIAG IV INF ADDON: CPT

## 2020-12-18 PROCEDURE — 82784 ASSAY IGA/IGD/IGG/IGM EACH: CPT

## 2020-12-18 PROCEDURE — 96365 THER/PROPH/DIAG IV INF INIT: CPT

## 2020-12-18 PROCEDURE — 99215 OFFICE O/P EST HI 40 MIN: CPT | Performed by: INTERNAL MEDICINE

## 2020-12-18 RX ORDER — DIPHENHYDRAMINE HCL 25 MG
25 CAPSULE ORAL ONCE
Status: CANCELLED | OUTPATIENT
Start: 2021-01-01

## 2020-12-18 RX ORDER — ACETAMINOPHEN 325 MG/1
650 TABLET ORAL ONCE
Status: CANCELLED | OUTPATIENT
Start: 2021-01-01

## 2020-12-18 RX ORDER — DIPHENHYDRAMINE HCL 25 MG
25 CAPSULE ORAL ONCE
Status: COMPLETED | OUTPATIENT
Start: 2020-12-18 | End: 2020-12-18

## 2020-12-18 RX ORDER — ACETAMINOPHEN 325 MG/1
650 TABLET ORAL ONCE
Status: COMPLETED | OUTPATIENT
Start: 2020-12-18 | End: 2020-12-18

## 2020-12-18 RX ADMIN — DIPHENHYDRAMINE HCL 25 MG: 25 MG CAPSULE ORAL at 08:22:00

## 2020-12-18 RX ADMIN — ACETAMINOPHEN 650 MG: 325 TABLET ORAL at 08:22:00

## 2020-12-18 NOTE — PROGRESS NOTES
Cancer Center Progress Note    Patient Name: Scott Lowry   YOB: 1965   Medical Record Number: [id number]   CSN: [id number]   Attending Physician: Lewis Sheikh M.D.    Referring Physician: Santi Hooker MD      Date of Visit: 12/18/2020 splenomegaly with decrease in known nodes. Slowly improved and was discharged home 9/7/19.     - Resumed Ibrutinib 10/4/19      History of Present Illness:  Says sugars are better controlled on current hypoglycemic regimen. Fatigue as before.  Noted a rash , Rfl:   •  B Complex Vitamins (B COMPLEX 1 OR), Take by mouth., Disp: , Rfl:   •  Cyanocobalamin (VITAMIN B 12 OR), Take by mouth., Disp: , Rfl:   •  Albuterol Sulfate HFA (PROAIR HFA) 108 (90 Base) MCG/ACT Inhalation Aero Soln, INHALE 2 PUFFS BY MOUTH EV insecurity        Worry: Not on file        Inability: Not on file      Transportation needs        Medical: Not on file        Non-medical: Not on file    Tobacco Use      Smoking status: Current Every Day Smoker        Years: 20.00        Types: Pipe BREATH  Mold                    SHORTNESS OF BREATH  Pollen                  SHORTNESS OF BREATH     Review of Systems:  A 14-point ROS was done with pertinent positives and negative per the HPI    Vital Signs:  Height: 188 cm (6' 2.02\") (12/18 0818)  [facility]. Patient presents with fever, bodyache and back pain.   Hx CLL      CONTRAST USED:  100cc of Omnipaque 350     FINDINGS:    CHEST:       LUNGS/PLEURA:  New bilateral posterior lower lobe atelectasis, greater on the left, without pleural effusion excessive stool. Appendix is visualized, normal.        ABDOMINAL WALL:  Unremarkable. URINARY BLADDER:  Unremarkable. PELVIC NODES:  Unremarkable. PELVIC ORGANS:  No acute process. OTHER:  None.      SKELETAL STRUCTURES IN THE CHEST/ABDO I have assessed the patient's emotional well-being and any concerns about anxiety or depression. We discussed issues of distress, coping difficulties and social support systems and currently there are no active problems.       Ragini Kruse MD  THE [facility]

## 2020-12-20 DIAGNOSIS — C91.10 CLL (CHRONIC LYMPHOCYTIC LEUKEMIA) (HCC): Primary | ICD-10-CM

## 2020-12-21 RX ORDER — ACYCLOVIR 400 MG/1
TABLET ORAL
Qty: 60 TABLET | Refills: 0 | Status: SHIPPED | OUTPATIENT
Start: 2020-12-21 | End: 2021-01-18

## 2020-12-23 DIAGNOSIS — C91.10 CLL (CHRONIC LYMPHOCYTIC LEUKEMIA) (HCC): Primary | ICD-10-CM

## 2020-12-23 RX ORDER — IBRUTINIB 420 MG/1
TABLET, FILM COATED ORAL
Qty: 28 TABLET | Refills: 2 | Status: SHIPPED | OUTPATIENT
Start: 2020-12-23 | End: 2021-03-15

## 2020-12-30 ENCOUNTER — TELEPHONE (OUTPATIENT)
Dept: HEMATOLOGY/ONCOLOGY | Facility: HOSPITAL | Age: 55
End: 2020-12-30

## 2020-12-30 RX ORDER — IMMUNE GLOBULIN INFUSION (HUMAN) 100 MG/ML
0.4 INJECTION, SOLUTION INTRAVENOUS; SUBCUTANEOUS
Qty: 43.52 G | Refills: 11 | Status: SHIPPED | OUTPATIENT
Start: 2020-12-30 | End: 2020-12-31

## 2020-12-30 NOTE — TELEPHONE ENCOUNTER
Dar called from [facility] and asked that the nurse reorder next [address] injection. If you need to call her, the number is as follows:  [phone].

## 2020-12-31 ENCOUNTER — TELEPHONE (OUTPATIENT)
Dept: HEMATOLOGY/ONCOLOGY | Facility: HOSPITAL | Age: 55
End: 2020-12-31

## 2020-12-31 RX ORDER — IMMUNE GLOBULIN INFUSION (HUMAN) 100 MG/ML
0.4 INJECTION, SOLUTION INTRAVENOUS; SUBCUTANEOUS
Qty: 43.52 G | Refills: 11 | Status: SHIPPED | OUTPATIENT
Start: 2020-12-31 | End: 2021-01-05

## 2020-12-31 NOTE — TELEPHONE ENCOUNTER
[facility] At [address] (not listed) there out of [place] asking for clarification on pt's Immune Globulin, Human, (GAMMAGARD) 20 GM/200ML Injection Solution. Contact them at 1 [phone] option 2.  Thank you Reginald Navarro

## 2021-01-02 ENCOUNTER — OFFICE VISIT (OUTPATIENT)
Dept: FAMILY MEDICINE CLINIC | Facility: CLINIC | Age: 56
End: 2021-01-02
Payer: COMMERCIAL

## 2021-01-02 ENCOUNTER — APPOINTMENT (OUTPATIENT)
Dept: GENERAL RADIOLOGY | Age: 56
End: 2021-01-02
Attending: EMERGENCY MEDICINE
Payer: COMMERCIAL

## 2021-01-02 ENCOUNTER — HOSPITAL ENCOUNTER (OUTPATIENT)
Age: 56
Discharge: HOME | End: 2021-01-02
Attending: EMERGENCY MEDICINE
Payer: COMMERCIAL

## 2021-01-02 ENCOUNTER — APPOINTMENT (OUTPATIENT)
Dept: ULTRASOUND IMAGING | Age: 56
End: 2021-01-02
Attending: EMERGENCY MEDICINE
Payer: COMMERCIAL

## 2021-01-02 VITALS
HEART RATE: 75 BPM | OXYGEN SATURATION: 97 % | RESPIRATION RATE: 20 BRPM | DIASTOLIC BLOOD PRESSURE: 62 MMHG | SYSTOLIC BLOOD PRESSURE: 117 MMHG | WEIGHT: 248 LBS | TEMPERATURE: 98 F | BODY MASS INDEX: 31.83 KG/M2 | HEIGHT: 74 IN

## 2021-01-02 DIAGNOSIS — S80.12XA HEMATOMA OF LEFT LOWER EXTREMITY, INITIAL ENCOUNTER: Primary | ICD-10-CM

## 2021-01-02 DIAGNOSIS — Z02.9 ENCOUNTER FOR ADMINISTRATIVE EXAMINATIONS: Primary | ICD-10-CM

## 2021-01-02 LAB
#MXD IC: 0.9 X10ˆ3/UL (ref 0.1–1)
HCT VFR BLD AUTO: 45.2 %
HGB BLD-MCNC: 14.8 G/DL
LYMPHOCYTES # BLD AUTO: 2.9 X10ˆ3/UL (ref 1–4)
LYMPHOCYTES NFR BLD AUTO: 32.3 %
MCH RBC QN AUTO: 29.5 PG (ref 26–34)
MCHC RBC AUTO-ENTMCNC: 32.7 G/DL (ref 31–37)
MCV RBC AUTO: 90.2 FL (ref 80–100)
MIXED CELL %: 10.1 %
NEUTROPHILS # BLD AUTO: 5.2 X10ˆ3/UL (ref 1.5–7.7)
NEUTROPHILS NFR BLD AUTO: 57.6 %
PLATELET # BLD AUTO: 163 X10ˆ3/UL (ref 150–450)
RBC # BLD AUTO: 5.01 X10ˆ6/UL
WBC # BLD AUTO: 9 X10ˆ3/UL (ref 4–11)

## 2021-01-02 PROCEDURE — 85025 COMPLETE CBC W/AUTO DIFF WBC: CPT | Performed by: EMERGENCY MEDICINE

## 2021-01-02 PROCEDURE — 73590 X-RAY EXAM OF LOWER LEG: CPT | Performed by: EMERGENCY MEDICINE

## 2021-01-02 PROCEDURE — 99215 OFFICE O/P EST HI 40 MIN: CPT

## 2021-01-02 PROCEDURE — 36415 COLL VENOUS BLD VENIPUNCTURE: CPT

## 2021-01-02 PROCEDURE — 93971 EXTREMITY STUDY: CPT | Performed by: EMERGENCY MEDICINE

## 2021-01-02 PROCEDURE — 99213 OFFICE O/P EST LOW 20 MIN: CPT

## 2021-01-02 NOTE — PROGRESS NOTES
Sprain, Minor bumped my leg last week, swelled up on calf. sent pictures to an RN friend who told me possibly Cellulitis and to get it checked out, also bruising on foot. ..left leg - Entered by patient     Refer to [facility] to r/o DVT, thrombophlebitis.

## 2021-01-02 NOTE — ED PROVIDER NOTES
Patient Seen in: [facility]      History   Patient presents with:  Leg Swelling  Leg Pain  Foot Injury    Stated Complaint: r/o blood clot - R [place]    HPI/Subjective:   HPI    54-year-old white male presents emerged from today for med of Alcohol/week: 3.0 standard drinks      Types: 3 Cans of beer per week      Frequency: 2-3 times a week      Drinks per session: 1 or 2      Comment: 4-6 Beers/week    Drug use:  No             Review of Systems    Positive for stated complaint: r/o blood cl Impression     CONCLUSION:  No acute findings.                  Venous Doppler of the left lower extremity revealed:    FINDINGS:     EXTREMITY EXAMINED:  Left lower   SAPHENOFEMORAL JUNCTION:  No reflux. THROMBI:  None visible.    COMPRESSION:  Normal

## 2021-01-02 NOTE — ED INITIAL ASSESSMENT (HPI)
Left leg- swelling and pain . Pt went to [facility] in [place]  And was sent here for for possible clot or cellulites Redness on the lower leg x 1 week. Left foot- pt has been wearing boot work shoes. Purple greenish bruise noted  On the foot.  Pain when walk

## 2021-01-05 DIAGNOSIS — C91.10 CLL (CHRONIC LYMPHOCYTIC LEUKEMIA) (HCC): Primary | ICD-10-CM

## 2021-01-05 DIAGNOSIS — D80.1 HYPOGAMMAGLOBULINEMIA (HCC): ICD-10-CM

## 2021-01-05 RX ORDER — IMMUNE GLOBULIN INFUSION (HUMAN) 100 MG/ML
0.4 INJECTION, SOLUTION INTRAVENOUS; SUBCUTANEOUS
Qty: 43.52 G | Refills: 11 | Status: SHIPPED | OUTPATIENT
Start: 2021-01-05 | End: 2021-04-22

## 2021-01-15 ENCOUNTER — OFFICE VISIT (OUTPATIENT)
Dept: HEMATOLOGY/ONCOLOGY | Facility: HOSPITAL | Age: 56
End: 2021-01-15
Attending: INTERNAL MEDICINE
Payer: COMMERCIAL

## 2021-01-15 VITALS
BODY MASS INDEX: 30.8 KG/M2 | OXYGEN SATURATION: 94 % | RESPIRATION RATE: 16 BRPM | WEIGHT: 240 LBS | TEMPERATURE: 97 F | SYSTOLIC BLOOD PRESSURE: 118 MMHG | DIASTOLIC BLOOD PRESSURE: 74 MMHG | HEART RATE: 84 BPM | HEIGHT: 74.02 IN

## 2021-01-15 DIAGNOSIS — D80.1 HYPOGAMMAGLOBULINEMIA (HCC): ICD-10-CM

## 2021-01-15 DIAGNOSIS — C91.10 CLL (CHRONIC LYMPHOCYTIC LEUKEMIA) (HCC): Primary | ICD-10-CM

## 2021-01-15 LAB — IMMUNOGLOBULIN PNL SER-MCNC: 433 MG/DL (ref 791–1643)

## 2021-01-15 PROCEDURE — 36415 COLL VENOUS BLD VENIPUNCTURE: CPT

## 2021-01-15 PROCEDURE — 96365 THER/PROPH/DIAG IV INF INIT: CPT

## 2021-01-15 PROCEDURE — 96366 THER/PROPH/DIAG IV INF ADDON: CPT

## 2021-01-15 PROCEDURE — 82784 ASSAY IGA/IGD/IGG/IGM EACH: CPT

## 2021-01-15 RX ORDER — DIPHENHYDRAMINE HCL 25 MG
25 CAPSULE ORAL ONCE
Status: COMPLETED | OUTPATIENT
Start: 2021-01-15 | End: 2021-01-15

## 2021-01-15 RX ORDER — ACETAMINOPHEN 325 MG/1
650 TABLET ORAL ONCE
Status: CANCELLED | OUTPATIENT
Start: 2021-02-01

## 2021-01-15 RX ORDER — ACETAMINOPHEN 325 MG/1
650 TABLET ORAL ONCE
Status: COMPLETED | OUTPATIENT
Start: 2021-01-15 | End: 2021-01-15

## 2021-01-15 RX ORDER — DIPHENHYDRAMINE HCL 25 MG
25 CAPSULE ORAL ONCE
Status: CANCELLED | OUTPATIENT
Start: 2021-02-01

## 2021-01-15 RX ADMIN — ACETAMINOPHEN 650 MG: 325 TABLET ORAL at 13:36:00

## 2021-01-15 RX ADMIN — DIPHENHYDRAMINE HCL 25 MG: 25 MG CAPSULE ORAL at 13:36:00

## 2021-01-15 NOTE — PROGRESS NOTES
Education Record    Learner:  Patient    Disease / Diagnosis: Hypogammaglobulinemia/CLL - IVIG infusion    Barriers / Limitations:  None   Comments:    Method:  Brief focused and Reinforcement   Comments:    General Topics:  Plan of care reviewed   Comment

## 2021-01-16 ENCOUNTER — LAB ENCOUNTER (OUTPATIENT)
Dept: LAB | Age: 56
End: 2021-01-16
Attending: INTERNAL MEDICINE
Payer: COMMERCIAL

## 2021-01-17 DIAGNOSIS — I10 ESSENTIAL HYPERTENSION: ICD-10-CM

## 2021-01-17 DIAGNOSIS — E11.9 TYPE 2 DIABETES MELLITUS WITHOUT COMPLICATION, WITHOUT LONG-TERM CURRENT USE OF INSULIN (HCC): ICD-10-CM

## 2021-01-17 DIAGNOSIS — C91.10 CLL (CHRONIC LYMPHOCYTIC LEUKEMIA) (HCC): ICD-10-CM

## 2021-01-17 RX ORDER — DAPAGLIFLOZIN 5 MG/1
TABLET, FILM COATED ORAL
Qty: 30 TABLET | Refills: 0 | Status: SHIPPED | OUTPATIENT
Start: 2021-01-17 | End: 2021-06-16

## 2021-01-17 RX ORDER — BENAZEPRIL HYDROCHLORIDE 10 MG/1
TABLET ORAL
Qty: 90 TABLET | Refills: 0 | Status: SHIPPED | OUTPATIENT
Start: 2021-01-17 | End: 2021-04-22

## 2021-01-18 RX ORDER — ACYCLOVIR 400 MG/1
TABLET ORAL
Qty: 60 TABLET | Refills: 2 | Status: SHIPPED | OUTPATIENT
Start: 2021-01-18 | End: 2021-04-22

## 2021-01-18 RX ORDER — FLUCONAZOLE 100 MG/1
TABLET ORAL
Qty: 30 TABLET | Refills: 2 | Status: SHIPPED | OUTPATIENT
Start: 2021-01-18 | End: 2021-04-22

## 2021-01-19 ENCOUNTER — TELEPHONE (OUTPATIENT)
Dept: HEMATOLOGY/ONCOLOGY | Facility: HOSPITAL | Age: 56
End: 2021-01-19

## 2021-01-19 NOTE — TELEPHONE ENCOUNTER
Hayley Smith calling wanting to speak to P/A team member regarding the increase on Deniz's gammagard. She is asking for office notes showing why the increase can be faxed to [phone].  Rochelle's phone is [phone] thank you kuldeep

## 2021-01-21 ENCOUNTER — TELEPHONE (OUTPATIENT)
Dept: HEMATOLOGY/ONCOLOGY | Facility: HOSPITAL | Age: 56
End: 2021-01-21

## 2021-01-22 ENCOUNTER — OFFICE VISIT (OUTPATIENT)
Dept: INTERNAL MEDICINE CLINIC | Facility: CLINIC | Age: 56
End: 2021-01-22
Payer: COMMERCIAL

## 2021-01-22 VITALS
TEMPERATURE: 97 F | OXYGEN SATURATION: 97 % | DIASTOLIC BLOOD PRESSURE: 72 MMHG | RESPIRATION RATE: 16 BRPM | WEIGHT: 243 LBS | BODY MASS INDEX: 31.18 KG/M2 | SYSTOLIC BLOOD PRESSURE: 136 MMHG | HEIGHT: 74 IN | HEART RATE: 97 BPM

## 2021-01-22 DIAGNOSIS — Z12.11 COLON CANCER SCREENING: ICD-10-CM

## 2021-01-22 DIAGNOSIS — I10 ESSENTIAL HYPERTENSION: ICD-10-CM

## 2021-01-22 DIAGNOSIS — J45.40 MODERATE PERSISTENT ASTHMA WITHOUT COMPLICATION: ICD-10-CM

## 2021-01-22 DIAGNOSIS — E11.9 TYPE 2 DIABETES MELLITUS WITHOUT COMPLICATION, WITHOUT LONG-TERM CURRENT USE OF INSULIN (HCC): Primary | ICD-10-CM

## 2021-01-22 PROCEDURE — 3075F SYST BP GE 130 - 139MM HG: CPT | Performed by: INTERNAL MEDICINE

## 2021-01-22 PROCEDURE — 3008F BODY MASS INDEX DOCD: CPT | Performed by: INTERNAL MEDICINE

## 2021-01-22 PROCEDURE — 99214 OFFICE O/P EST MOD 30 MIN: CPT | Performed by: INTERNAL MEDICINE

## 2021-01-22 PROCEDURE — 3078F DIAST BP <80 MM HG: CPT | Performed by: INTERNAL MEDICINE

## 2021-01-22 NOTE — PROGRESS NOTES
HPI:    Patient ID: Arlen Guzman is a 54year old male. Diabetes    Hypertension      HPI:   Arlen Guzman is a 54year old male who presents for recheck of his diabetes, htn and asthma. Patient’s FBS have been at goal. No hypoglycemia.  . Pt denies any t MOUTH TWO TIMES A DAY  60 tablet 2   • FLUCONAZOLE 100 MG Oral Tab TAKE 1 TABLET BY MOUTH EVERY DAY  30 tablet 2   • BENAZEPRIL HCL 10 MG Oral Tab TAKE 1 TABLET BY MOUTH EVERY DAY  90 tablet 0   • METFORMIN  MG Oral Tab TAKE 1 TABLET BY MOUTH TWO TI • High cholesterol    • Kidney stone    • Laboratory examination ordered as part of a routine general medical examination    • Type II or unspecified type diabetes mellitus without mention of complication, not stated as uncontrolled    • Unspecified ess lower legs/feet is normal as well. ASSESSMENT AND PLAN:   Scott Lowry is a 54year old male who presents for a recheck of his diabetes, htn and asthma. Diabetic control is at goal. htn is controlled. Asthma is controlled.   Cont statin for cad risk re • B Complex Vitamins (B COMPLEX 1 OR) Take by mouth. • Cyanocobalamin (VITAMIN B 12 OR) Take by mouth.      • Albuterol Sulfate HFA (PROAIR HFA) 108 (90 Base) MCG/ACT Inhalation Aero Soln INHALE 2 PUFFS BY MOUTH EVERY SIX HOURS AS NEEDED for wheezing

## 2021-02-12 ENCOUNTER — OFFICE VISIT (OUTPATIENT)
Dept: HEMATOLOGY/ONCOLOGY | Facility: HOSPITAL | Age: 56
End: 2021-02-12
Attending: INTERNAL MEDICINE
Payer: COMMERCIAL

## 2021-02-12 VITALS
RESPIRATION RATE: 16 BRPM | DIASTOLIC BLOOD PRESSURE: 77 MMHG | BODY MASS INDEX: 31.7 KG/M2 | TEMPERATURE: 97 F | SYSTOLIC BLOOD PRESSURE: 121 MMHG | HEART RATE: 99 BPM | WEIGHT: 247 LBS | HEIGHT: 74.02 IN | OXYGEN SATURATION: 97 %

## 2021-02-12 DIAGNOSIS — E11.9 TYPE 2 DIABETES MELLITUS WITHOUT COMPLICATION, WITHOUT LONG-TERM CURRENT USE OF INSULIN (HCC): Primary | ICD-10-CM

## 2021-02-12 DIAGNOSIS — E11.9 TYPE 2 DIABETES MELLITUS WITHOUT COMPLICATION, WITHOUT LONG-TERM CURRENT USE OF INSULIN (HCC): ICD-10-CM

## 2021-02-12 DIAGNOSIS — C91.10 CLL (CHRONIC LYMPHOCYTIC LEUKEMIA) (HCC): Primary | ICD-10-CM

## 2021-02-12 LAB
ALBUMIN SERPL-MCNC: 4 G/DL (ref 3.4–5)
ALBUMIN/GLOB SERPL: 1.4 {RATIO} (ref 1–2)
ALP LIVER SERPL-CCNC: 64 U/L
ALT SERPL-CCNC: 52 U/L
ANION GAP SERPL CALC-SCNC: 8 MMOL/L (ref 0–18)
AST SERPL-CCNC: 29 U/L (ref 15–37)
BASOPHILS # BLD AUTO: 0.09 X10(3) UL (ref 0–0.2)
BASOPHILS NFR BLD AUTO: 1 %
BILIRUB SERPL-MCNC: 0.3 MG/DL (ref 0.1–2)
BUN BLD-MCNC: 18 MG/DL (ref 7–18)
BUN/CREAT SERPL: 14.3 (ref 10–20)
CALCIUM BLD-MCNC: 9.4 MG/DL (ref 8.5–10.1)
CHLORIDE SERPL-SCNC: 105 MMOL/L (ref 98–112)
CO2 SERPL-SCNC: 26 MMOL/L (ref 21–32)
CREAT BLD-MCNC: 1.26 MG/DL
DEPRECATED RDW RBC AUTO: 39.9 FL (ref 35.1–46.3)
EOSINOPHIL # BLD AUTO: 0.12 X10(3) UL (ref 0–0.7)
EOSINOPHIL NFR BLD AUTO: 1.3 %
ERYTHROCYTE [DISTWIDTH] IN BLOOD BY AUTOMATED COUNT: 12.3 % (ref 11–15)
EST. AVERAGE GLUCOSE BLD GHB EST-MCNC: 192 MG/DL (ref 68–126)
GLOBULIN PLAS-MCNC: 2.8 G/DL (ref 2.8–4.4)
GLUCOSE BLD-MCNC: 155 MG/DL (ref 70–99)
HBA1C MFR BLD HPLC: 8.3 % (ref ?–5.7)
HCT VFR BLD AUTO: 48.1 %
HGB BLD-MCNC: 16.2 G/DL
IMM GRANULOCYTES # BLD AUTO: 0.03 X10(3) UL (ref 0–1)
IMM GRANULOCYTES NFR BLD: 0.3 %
IMMUNOGLOBULIN PNL SER-MCNC: 433 MG/DL (ref 791–1643)
LDH SERPL L TO P-CCNC: 134 U/L
LYMPHOCYTES # BLD AUTO: 3.04 X10(3) UL (ref 1–4)
LYMPHOCYTES NFR BLD AUTO: 34.2 %
M PROTEIN MFR SERPL ELPH: 6.8 G/DL (ref 6.4–8.2)
MCH RBC QN AUTO: 30.2 PG (ref 26–34)
MCHC RBC AUTO-ENTMCNC: 33.7 G/DL (ref 31–37)
MCV RBC AUTO: 89.7 FL
MONOCYTES # BLD AUTO: 0.62 X10(3) UL (ref 0.1–1)
MONOCYTES NFR BLD AUTO: 7 %
NEUTROPHILS # BLD AUTO: 5 X10 (3) UL (ref 1.5–7.7)
NEUTROPHILS # BLD AUTO: 5 X10(3) UL (ref 1.5–7.7)
NEUTROPHILS NFR BLD AUTO: 56.2 %
OSMOLALITY SERPL CALC.SUM OF ELEC: 293 MOSM/KG (ref 275–295)
PATIENT FASTING Y/N/NP: NO
PLATELET # BLD AUTO: 167 10(3)UL (ref 150–450)
POTASSIUM SERPL-SCNC: 4.2 MMOL/L (ref 3.5–5.1)
RBC # BLD AUTO: 5.36 X10(6)UL
SODIUM SERPL-SCNC: 139 MMOL/L (ref 136–145)
WBC # BLD AUTO: 8.9 X10(3) UL (ref 4–11)

## 2021-02-12 PROCEDURE — 83036 HEMOGLOBIN GLYCOSYLATED A1C: CPT

## 2021-02-12 PROCEDURE — 82784 ASSAY IGA/IGD/IGG/IGM EACH: CPT

## 2021-02-12 PROCEDURE — 85025 COMPLETE CBC W/AUTO DIFF WBC: CPT

## 2021-02-12 PROCEDURE — 80053 COMPREHEN METABOLIC PANEL: CPT

## 2021-02-12 PROCEDURE — 36415 COLL VENOUS BLD VENIPUNCTURE: CPT

## 2021-02-12 PROCEDURE — 96366 THER/PROPH/DIAG IV INF ADDON: CPT

## 2021-02-12 PROCEDURE — 83615 LACTATE (LD) (LDH) ENZYME: CPT

## 2021-02-12 PROCEDURE — 96365 THER/PROPH/DIAG IV INF INIT: CPT

## 2021-02-12 PROCEDURE — 3052F HG A1C>EQUAL 8.0%<EQUAL 9.0%: CPT | Performed by: INTERNAL MEDICINE

## 2021-02-12 RX ORDER — DIPHENHYDRAMINE HCL 25 MG
25 CAPSULE ORAL ONCE
Status: COMPLETED | OUTPATIENT
Start: 2021-02-12 | End: 2021-02-12

## 2021-02-12 RX ORDER — ACETAMINOPHEN 325 MG/1
650 TABLET ORAL ONCE
Status: CANCELLED | OUTPATIENT
Start: 2021-03-01

## 2021-02-12 RX ORDER — DIPHENHYDRAMINE HCL 25 MG
25 CAPSULE ORAL ONCE
Status: CANCELLED | OUTPATIENT
Start: 2021-03-01

## 2021-02-12 RX ORDER — ACETAMINOPHEN 325 MG/1
650 TABLET ORAL ONCE
Status: COMPLETED | OUTPATIENT
Start: 2021-02-12 | End: 2021-02-12

## 2021-02-12 RX ADMIN — ACETAMINOPHEN 650 MG: 325 TABLET ORAL at 13:54:00

## 2021-02-12 RX ADMIN — DIPHENHYDRAMINE HCL 25 MG: 25 MG CAPSULE ORAL at 13:54:00

## 2021-02-12 NOTE — TELEPHONE ENCOUNTER
----- Message from Marybelle Mortimer, MD sent at 2/12/2021  2:57 PM CST -----  DM2 not controlled. , increase farxiga to 10 mg and recheck a1c in 3 m

## 2021-02-12 NOTE — PROGRESS NOTES
Education Record    Learner:  Patient    Disease / Keena [place]    Barriers / Limitations:  None   Comments:    Method:  Discussion   Comments:    General Topics:  Plan of care reviewed   Comments:    Outcome:  Shows understanding   Comm

## 2021-02-12 NOTE — TELEPHONE ENCOUNTER
[place]  Last time medication was refilled 01/17/2021  Quantity and # of refills 30 w/ 0 refills  Last OV 01/22/2021  Increasing dosage from 5mg to 10mg per written order.

## 2021-02-16 DIAGNOSIS — E11.9 TYPE 2 DIABETES MELLITUS WITHOUT COMPLICATION, WITHOUT LONG-TERM CURRENT USE OF INSULIN (HCC): ICD-10-CM

## 2021-02-16 RX ORDER — GLIMEPIRIDE 4 MG/1
TABLET ORAL
Qty: 180 TABLET | Refills: 0 | Status: SHIPPED | OUTPATIENT
Start: 2021-02-16 | End: 2021-05-19

## 2021-03-01 RX ORDER — ACETAMINOPHEN 325 MG/1
650 TABLET ORAL ONCE
Status: CANCELLED | OUTPATIENT
Start: 2021-04-01

## 2021-03-01 RX ORDER — DIPHENHYDRAMINE HCL 25 MG
25 CAPSULE ORAL ONCE
Status: CANCELLED | OUTPATIENT
Start: 2021-04-01

## 2021-03-12 ENCOUNTER — OFFICE VISIT (OUTPATIENT)
Dept: HEMATOLOGY/ONCOLOGY | Facility: HOSPITAL | Age: 56
End: 2021-03-12
Attending: INTERNAL MEDICINE
Payer: COMMERCIAL

## 2021-03-12 VITALS
HEIGHT: 74.02 IN | BODY MASS INDEX: 30.67 KG/M2 | TEMPERATURE: 97 F | HEART RATE: 89 BPM | RESPIRATION RATE: 16 BRPM | DIASTOLIC BLOOD PRESSURE: 75 MMHG | WEIGHT: 239 LBS | SYSTOLIC BLOOD PRESSURE: 113 MMHG | OXYGEN SATURATION: 96 %

## 2021-03-12 DIAGNOSIS — D69.6 THROMBOCYTOPENIA (HCC): Primary | ICD-10-CM

## 2021-03-12 DIAGNOSIS — R74.01 TRANSAMINITIS: ICD-10-CM

## 2021-03-12 DIAGNOSIS — C91.10 CLL (CHRONIC LYMPHOCYTIC LEUKEMIA) (HCC): ICD-10-CM

## 2021-03-12 DIAGNOSIS — C91.10 CLL (CHRONIC LYMPHOCYTIC LEUKEMIA) (HCC): Primary | ICD-10-CM

## 2021-03-12 DIAGNOSIS — D72.820 LYMPHOCYTOSIS: ICD-10-CM

## 2021-03-12 DIAGNOSIS — D80.1 HYPOGAMMAGLOBULINEMIA (HCC): ICD-10-CM

## 2021-03-12 DIAGNOSIS — R79.89 BLOOD CREATININE INCREASED COMPARED WITH PRIOR MEASUREMENT: ICD-10-CM

## 2021-03-12 LAB — IMMUNOGLOBULIN PNL SER-MCNC: 449 MG/DL (ref 791–1643)

## 2021-03-12 PROCEDURE — 96366 THER/PROPH/DIAG IV INF ADDON: CPT

## 2021-03-12 PROCEDURE — 99214 OFFICE O/P EST MOD 30 MIN: CPT | Performed by: INTERNAL MEDICINE

## 2021-03-12 PROCEDURE — 96365 THER/PROPH/DIAG IV INF INIT: CPT

## 2021-03-12 RX ORDER — DIPHENHYDRAMINE HCL 25 MG
25 CAPSULE ORAL ONCE
Status: COMPLETED | OUTPATIENT
Start: 2021-03-12 | End: 2021-03-12

## 2021-03-12 RX ORDER — DIPHENHYDRAMINE HCL 25 MG
25 CAPSULE ORAL ONCE
Status: CANCELLED | OUTPATIENT
Start: 2021-04-01

## 2021-03-12 RX ORDER — ACETAMINOPHEN 325 MG/1
650 TABLET ORAL ONCE
Status: CANCELLED | OUTPATIENT
Start: 2021-04-01

## 2021-03-12 RX ORDER — ACETAMINOPHEN 325 MG/1
650 TABLET ORAL ONCE
Status: COMPLETED | OUTPATIENT
Start: 2021-03-12 | End: 2021-03-12

## 2021-03-12 RX ADMIN — DIPHENHYDRAMINE HCL 25 MG: 25 MG CAPSULE ORAL at 13:06:00

## 2021-03-12 RX ADMIN — ACETAMINOPHEN 650 MG: 325 TABLET ORAL at 13:07:00

## 2021-03-12 NOTE — PROGRESS NOTES
Education Record    Learner:  Patient    Disease / Ethyl Hippo   Barriers / Limitations:  None   Comments:    Method:  Brief focused   Comments:    General Topics:  Infection, Medication, Pain, Precautions, Procedure, Side effects and s

## 2021-03-12 NOTE — PROGRESS NOTES
Cancer Center Progress Note    Patient Name: Josefa Reese   YOB: 1965   Medical Record Number: [id number]   CSN: [id number]   Attending Physician: Bethanie Rivera M.D.    Referring Physician: Billie Desouza MD      Date of Visit: 3/12/2021 decrease in known nodes. Slowly improved and was discharged home 9/7/19.     - Resumed Ibrutinib 10/4/19      History of Present Illness:  Doing fairly well he says. No problems on Ibrutinib. Some fatigue. No bleeding, palpitations or recent infections.  Niranjan Pulido •  B Complex Vitamins (B COMPLEX 1 OR), Take by mouth., Disp: , Rfl:   •  Cyanocobalamin (VITAMIN B 12 OR), Take by mouth., Disp: , Rfl:   •  Albuterol Sulfate HFA (PROAIR HFA) 108 (90 Base) MCG/ACT Inhalation Aero Soln, INHALE 2 PUFFS BY MOUTH EVERY SIX 20. 00        Types: Pipe      Smokeless tobacco: Never Used      Tobacco comment: pipes and cigars    Vaping Use      Vaping Use: Never used    Substance and Sexual Activity      Alcohol use:  Yes        Alcohol/week: 3.0 standard drinks        Types: 3 Can SHORTNESS OF BREATH  Pollen                  SHORTNESS OF BREATH     Review of Systems:  A 14-point ROS was done with pertinent positives and negative per the HPI    Vital Signs:  Height: 188 cm (6' 2.02\") (03/12 1255)  Weight: 108.4 kg (239 lb) (03/12 12 02/12/2021     Lab Results   Component Value Date     (H) 02/12/2021    BUN 18 02/12/2021    BUNCREA 14.3 02/12/2021    CREATSERUM 1.26 02/12/2021    ANIONGAP 8 02/12/2021    GFR 98 01/11/2018    GFRNAA 64 02/12/2021    GFRAA 74 02/12/2021    CA 9.4 greatest short axis dimension, image 48 in the left axilla, all other lymph nodes are smaller, with previous size measurements   up to 3.4 cm on the right, and up to 2.4 cm on the left. .     ABDOMEN/PELVIS:     LIVER:  Unremarkable.       BILIARY:  Soraida Eisenmenger is no perisplenic fluid. 2. Decrease in the size of previously clearly enlarged axillary lymph nodes, now normal size, the largest measuring 9 mm left axilla. Stable right hilar lymph node mildly enlarged.      3. Decrease in the size of retroperitonea

## 2021-03-15 RX ORDER — IBRUTINIB 420 MG/1
TABLET, FILM COATED ORAL
Qty: 28 TABLET | Refills: 0 | Status: SHIPPED | OUTPATIENT
Start: 2021-03-15 | End: 2021-04-11

## 2021-03-17 DIAGNOSIS — Z23 NEED FOR VACCINATION: ICD-10-CM

## 2021-03-18 DIAGNOSIS — E78.2 MIXED HYPERLIPIDEMIA: ICD-10-CM

## 2021-03-18 DIAGNOSIS — E11.9 TYPE 2 DIABETES MELLITUS WITHOUT COMPLICATION, WITHOUT LONG-TERM CURRENT USE OF INSULIN (HCC): ICD-10-CM

## 2021-03-18 RX ORDER — ATORVASTATIN CALCIUM 40 MG/1
TABLET, FILM COATED ORAL
Qty: 30 TABLET | Refills: 0 | Status: SHIPPED | OUTPATIENT
Start: 2021-03-18 | End: 2021-04-22

## 2021-03-24 ENCOUNTER — TELEPHONE (OUTPATIENT)
Dept: HEMATOLOGY/ONCOLOGY | Facility: HOSPITAL | Age: 56
End: 2021-03-24

## 2021-03-24 NOTE — TELEPHONE ENCOUNTER
Marilyn from pharmacy  calling asking if Dr Alek Acosta wants to order immune globulin [facility]) 10 % for April . Please contact marilyn at [phone].  Thank you kuldeep

## 2021-03-25 ENCOUNTER — TELEPHONE (OUTPATIENT)
Dept: HEMATOLOGY/ONCOLOGY | Facility: HOSPITAL | Age: 56
End: 2021-03-25

## 2021-03-25 NOTE — TELEPHONE ENCOUNTER
Beverly Decker calling asking about his IVIG he thought it was to be done every 4 weeks. He is currently scheduled 6/11. Please advise .  Thank you Preethi Oleary

## 2021-04-09 ENCOUNTER — OFFICE VISIT (OUTPATIENT)
Dept: HEMATOLOGY/ONCOLOGY | Facility: HOSPITAL | Age: 56
End: 2021-04-09
Attending: INTERNAL MEDICINE
Payer: COMMERCIAL

## 2021-04-09 VITALS
SYSTOLIC BLOOD PRESSURE: 112 MMHG | OXYGEN SATURATION: 96 % | HEART RATE: 72 BPM | RESPIRATION RATE: 16 BRPM | HEIGHT: 74.02 IN | DIASTOLIC BLOOD PRESSURE: 77 MMHG | TEMPERATURE: 98 F | BODY MASS INDEX: 31.32 KG/M2 | WEIGHT: 244 LBS

## 2021-04-09 DIAGNOSIS — C91.10 CLL (CHRONIC LYMPHOCYTIC LEUKEMIA) (HCC): Primary | ICD-10-CM

## 2021-04-09 DIAGNOSIS — D80.1 HYPOGAMMAGLOBULINEMIA (HCC): ICD-10-CM

## 2021-04-09 DIAGNOSIS — C91.10 CLL (CHRONIC LYMPHOCYTIC LEUKEMIA) (HCC): ICD-10-CM

## 2021-04-09 PROCEDURE — 96365 THER/PROPH/DIAG IV INF INIT: CPT

## 2021-04-09 PROCEDURE — 82784 ASSAY IGA/IGD/IGG/IGM EACH: CPT

## 2021-04-09 PROCEDURE — 96366 THER/PROPH/DIAG IV INF ADDON: CPT

## 2021-04-09 RX ORDER — DIPHENHYDRAMINE HCL 25 MG
25 CAPSULE ORAL ONCE
Status: COMPLETED | OUTPATIENT
Start: 2021-04-09 | End: 2021-04-09

## 2021-04-09 RX ORDER — ACETAMINOPHEN 325 MG/1
650 TABLET ORAL ONCE
Status: COMPLETED | OUTPATIENT
Start: 2021-04-09 | End: 2021-04-09

## 2021-04-09 RX ADMIN — ACETAMINOPHEN 650 MG: 325 TABLET ORAL at 13:06:00

## 2021-04-09 RX ADMIN — DIPHENHYDRAMINE HCL 25 MG: 25 MG CAPSULE ORAL at 13:06:00

## 2021-04-09 NOTE — PROGRESS NOTES
Education Record    Learner:  Patient    Disease / Diagnosis: IVIG infusion.     Barriers / Limitations:  None   Comments:    Method:  Brief focused and Discussion   Comments:    General Topics:  Plan of care reviewed   Comments:    Outcome:  Shows understa

## 2021-04-11 RX ORDER — IBRUTINIB 420 MG/1
TABLET, FILM COATED ORAL
Qty: 28 TABLET | Refills: 2 | Status: SHIPPED | OUTPATIENT
Start: 2021-04-11 | End: 2021-07-07

## 2021-04-22 DIAGNOSIS — C91.10 CLL (CHRONIC LYMPHOCYTIC LEUKEMIA) (HCC): ICD-10-CM

## 2021-04-22 DIAGNOSIS — I10 ESSENTIAL HYPERTENSION: ICD-10-CM

## 2021-04-22 DIAGNOSIS — E78.2 MIXED HYPERLIPIDEMIA: ICD-10-CM

## 2021-04-22 DIAGNOSIS — E11.9 TYPE 2 DIABETES MELLITUS WITHOUT COMPLICATION, WITHOUT LONG-TERM CURRENT USE OF INSULIN (HCC): ICD-10-CM

## 2021-04-22 DIAGNOSIS — D80.1 HYPOGAMMAGLOBULINEMIA (HCC): ICD-10-CM

## 2021-04-22 RX ORDER — FLUCONAZOLE 100 MG/1
TABLET ORAL
Qty: 30 TABLET | Refills: 5 | Status: SHIPPED | OUTPATIENT
Start: 2021-04-22 | End: 2021-10-12

## 2021-04-22 RX ORDER — IMMUNE GLOBULIN INFUSION (HUMAN) 100 MG/ML
0.4 INJECTION, SOLUTION INTRAVENOUS; SUBCUTANEOUS
Qty: 43.52 G | Refills: 11 | Status: SHIPPED | OUTPATIENT
Start: 2021-04-22 | End: 2021-04-28

## 2021-04-22 RX ORDER — ATORVASTATIN CALCIUM 40 MG/1
TABLET, FILM COATED ORAL
Qty: 30 TABLET | Refills: 0 | Status: SHIPPED | OUTPATIENT
Start: 2021-04-22 | End: 2021-05-19

## 2021-04-22 RX ORDER — BENAZEPRIL HYDROCHLORIDE 10 MG/1
TABLET ORAL
Qty: 90 TABLET | Refills: 0 | Status: SHIPPED | OUTPATIENT
Start: 2021-04-22 | End: 2021-07-13

## 2021-04-22 RX ORDER — ACYCLOVIR 400 MG/1
TABLET ORAL
Qty: 60 TABLET | Refills: 5 | Status: SHIPPED | OUTPATIENT
Start: 2021-04-22 | End: 2021-06-16

## 2021-04-22 NOTE — TELEPHONE ENCOUNTER
Pharmacy calling to refill Immune Globulin, Human, (GAMMAGARD) 20 GM/200ML Injection Solution.  erin

## 2021-04-23 ENCOUNTER — TELEPHONE (OUTPATIENT)
Dept: HEMATOLOGY/ONCOLOGY | Facility: HOSPITAL | Age: 56
End: 2021-04-23

## 2021-04-23 NOTE — TELEPHONE ENCOUNTER
Received call from pharmacy confirming dose of IVIG. MD aware of dose change due to change in preparation. Pharmacy verbalized understanding.

## 2021-04-28 DIAGNOSIS — C91.10 CLL (CHRONIC LYMPHOCYTIC LEUKEMIA) (HCC): ICD-10-CM

## 2021-04-28 DIAGNOSIS — D80.1 HYPOGAMMAGLOBULINEMIA (HCC): ICD-10-CM

## 2021-04-28 DIAGNOSIS — L65.9 ALOPECIA: ICD-10-CM

## 2021-04-28 RX ORDER — IMMUNE GLOBULIN INFUSION (HUMAN) 100 MG/ML
INJECTION, SOLUTION INTRAVENOUS; SUBCUTANEOUS
Qty: 20 G | Refills: 11 | Status: SHIPPED | OUTPATIENT
Start: 2021-04-28

## 2021-04-28 RX ORDER — FINASTERIDE 5 MG/1
TABLET, FILM COATED ORAL
Qty: 30 TABLET | Refills: 0 | OUTPATIENT
Start: 2021-04-28

## 2021-04-29 ENCOUNTER — TELEPHONE (OUTPATIENT)
Dept: HEMATOLOGY/ONCOLOGY | Facility: HOSPITAL | Age: 56
End: 2021-04-29

## 2021-04-29 NOTE — TELEPHONE ENCOUNTER
Darlene Whalen, [address] [place] # [phone] ext [phone], Fax # [phone] is calling for an order clarification for GammaGard. Please Call Mirela Brandon, what dose 44 gm 20 gm every 4 weeks.

## 2021-05-07 ENCOUNTER — OFFICE VISIT (OUTPATIENT)
Dept: HEMATOLOGY/ONCOLOGY | Facility: HOSPITAL | Age: 56
End: 2021-05-07
Attending: INTERNAL MEDICINE
Payer: COMMERCIAL

## 2021-05-07 VITALS
HEART RATE: 80 BPM | DIASTOLIC BLOOD PRESSURE: 70 MMHG | SYSTOLIC BLOOD PRESSURE: 112 MMHG | BODY MASS INDEX: 31.18 KG/M2 | TEMPERATURE: 97 F | WEIGHT: 243 LBS | HEIGHT: 74.02 IN | RESPIRATION RATE: 16 BRPM | OXYGEN SATURATION: 96 %

## 2021-05-07 DIAGNOSIS — C91.10 CLL (CHRONIC LYMPHOCYTIC LEUKEMIA) (HCC): Primary | ICD-10-CM

## 2021-05-07 PROCEDURE — 96365 THER/PROPH/DIAG IV INF INIT: CPT

## 2021-05-07 PROCEDURE — 96366 THER/PROPH/DIAG IV INF ADDON: CPT

## 2021-05-07 RX ORDER — DIPHENHYDRAMINE HCL 25 MG
25 CAPSULE ORAL ONCE
Status: COMPLETED | OUTPATIENT
Start: 2021-05-07 | End: 2021-05-07

## 2021-05-07 RX ORDER — ACETAMINOPHEN 325 MG/1
650 TABLET ORAL ONCE
Status: COMPLETED | OUTPATIENT
Start: 2021-05-07 | End: 2021-05-07

## 2021-05-07 RX ADMIN — DIPHENHYDRAMINE HCL 25 MG: 25 MG CAPSULE ORAL at 13:05:00

## 2021-05-07 RX ADMIN — ACETAMINOPHEN 650 MG: 325 TABLET ORAL at 13:05:00

## 2021-05-19 DIAGNOSIS — E78.2 MIXED HYPERLIPIDEMIA: ICD-10-CM

## 2021-05-19 DIAGNOSIS — E11.9 TYPE 2 DIABETES MELLITUS WITHOUT COMPLICATION, WITHOUT LONG-TERM CURRENT USE OF INSULIN (HCC): ICD-10-CM

## 2021-05-19 RX ORDER — GLIMEPIRIDE 4 MG/1
TABLET ORAL
Qty: 180 TABLET | Refills: 0 | Status: SHIPPED | OUTPATIENT
Start: 2021-05-19 | End: 2021-08-18

## 2021-05-19 RX ORDER — ATORVASTATIN CALCIUM 40 MG/1
TABLET, FILM COATED ORAL
Qty: 30 TABLET | Refills: 0 | Status: SHIPPED | OUTPATIENT
Start: 2021-05-19 | End: 2021-06-16

## 2021-05-21 DIAGNOSIS — E11.9 TYPE 2 DIABETES MELLITUS WITHOUT COMPLICATION, WITHOUT LONG-TERM CURRENT USE OF INSULIN (HCC): ICD-10-CM

## 2021-05-21 NOTE — TELEPHONE ENCOUNTER
Fax received for a refill for:    [address] Anuradha Villavicencio, [address] [phone], [phone]    Put in triage office

## 2021-05-24 ENCOUNTER — TELEPHONE (OUTPATIENT)
Dept: HEMATOLOGY/ONCOLOGY | Facility: HOSPITAL | Age: 56
End: 2021-05-24

## 2021-05-24 NOTE — TELEPHONE ENCOUNTER
Message for Pharmacy at [facility] regarding patient Seth Escalante  65, Call back to arrange delivery of patient's medication. Gave message to Suhail at [phone].

## 2021-05-27 ENCOUNTER — PATIENT MESSAGE (OUTPATIENT)
Dept: INTERNAL MEDICINE CLINIC | Facility: CLINIC | Age: 56
End: 2021-05-27

## 2021-05-27 NOTE — TELEPHONE ENCOUNTER
From: Jaden Paul  To: Eve Brantley MD  Sent: 5/27/2021 11:17 AM CDT  Subject: Non-Urgent Medical Question    You want to set up an a1c test

## 2021-06-01 ENCOUNTER — MED REC SCAN ONLY (OUTPATIENT)
Dept: INTERNAL MEDICINE CLINIC | Facility: CLINIC | Age: 56
End: 2021-06-01

## 2021-06-11 ENCOUNTER — OFFICE VISIT (OUTPATIENT)
Dept: HEMATOLOGY/ONCOLOGY | Facility: HOSPITAL | Age: 56
End: 2021-06-11
Attending: INTERNAL MEDICINE
Payer: COMMERCIAL

## 2021-06-11 VITALS
BODY MASS INDEX: 30.83 KG/M2 | HEIGHT: 74.02 IN | OXYGEN SATURATION: 97 % | TEMPERATURE: 97 F | DIASTOLIC BLOOD PRESSURE: 68 MMHG | RESPIRATION RATE: 16 BRPM | WEIGHT: 240.19 LBS | HEART RATE: 96 BPM | SYSTOLIC BLOOD PRESSURE: 105 MMHG

## 2021-06-11 DIAGNOSIS — C91.10 CLL (CHRONIC LYMPHOCYTIC LEUKEMIA) (HCC): Primary | ICD-10-CM

## 2021-06-11 DIAGNOSIS — C91.10 CLL (CHRONIC LYMPHOCYTIC LEUKEMIA) (HCC): ICD-10-CM

## 2021-06-11 DIAGNOSIS — E11.9 TYPE 2 DIABETES MELLITUS WITHOUT COMPLICATION, WITHOUT LONG-TERM CURRENT USE OF INSULIN (HCC): ICD-10-CM

## 2021-06-11 DIAGNOSIS — D80.1 HYPOGAMMAGLOBULINEMIA (HCC): Primary | ICD-10-CM

## 2021-06-11 PROCEDURE — 96366 THER/PROPH/DIAG IV INF ADDON: CPT

## 2021-06-11 PROCEDURE — 99215 OFFICE O/P EST HI 40 MIN: CPT | Performed by: INTERNAL MEDICINE

## 2021-06-11 PROCEDURE — 96365 THER/PROPH/DIAG IV INF INIT: CPT

## 2021-06-11 RX ORDER — DIPHENHYDRAMINE HCL 25 MG
25 CAPSULE ORAL ONCE
Status: COMPLETED | OUTPATIENT
Start: 2021-06-11 | End: 2021-06-11

## 2021-06-11 RX ORDER — ACETAMINOPHEN 325 MG/1
650 TABLET ORAL ONCE
Status: COMPLETED | OUTPATIENT
Start: 2021-06-11 | End: 2021-06-11

## 2021-06-11 RX ADMIN — ACETAMINOPHEN 650 MG: 325 TABLET ORAL at 14:09:00

## 2021-06-11 RX ADMIN — DIPHENHYDRAMINE HCL 25 MG: 25 MG CAPSULE ORAL at 14:09:00

## 2021-06-11 NOTE — PROGRESS NOTES
Cancer Center Progress Note    Patient Name: Jaden Paul   YOB: 1965   Medical Record Number: [id number]   CSN: [id number]   Attending Physician: Ragini Kruse M.D.    Referring Physician: Eve Brantley MD      Date of Visit: 6/11/2021 and was discharged home 9/7/19.     - Resumed Ibrutinib 10/4/19      History of Present Illness:  Reports fatigue otherwise doing well. No problems on Ibrutinib- no bleeding, palpitations or recent infections.  Denies SOB, abdominal pain, change in his [place] COMPLEX 1 OR), Take by mouth., Disp: , Rfl:   •  Cyanocobalamin (VITAMIN B 12 OR), Take by mouth., Disp: , Rfl:   •  Albuterol Sulfate HFA (PROAIR HFA) 108 (90 Base) MCG/ACT Inhalation Aero Soln, INHALE 2 PUFFS BY MOUTH EVERY SIX HOURS AS NEEDED for [employer] Use: Never used    Substance and Sexual Activity      Alcohol use:  Yes        Alcohol/week: 3.0 standard drinks        Types: 3 Cans of beer per week        Comment: 4-6 Beers/week      Drug use: No      Sexual activity: Not on file    Other Topics      Co pertinent positives and negative per the HPI    Vital Signs:  Height: 188 cm (6' 2.02\") (06/11 1243)  Weight: 109 kg (240 lb 3.2 oz) (06/11 1243)  BSA (Calculated - sq m): 2.35 sq meters (06/11 1243)  Pulse: 96 (06/11 1243)  BP: 105/68 (06/11 1243)  Temp: QUANT    Collection Time: 06/11/21 12:37 PM   Result Value Ref Range    Immunoglobulin G 409 (L) 791-1,643 mg/dL   CBC W/ DIFFERENTIAL    Collection Time: 06/11/21 12:37 PM   Result Value Ref Range    WBC 8.8 4.0 - 11.0 x10(3) uL    RBC 5.29 4.30 - 5.70 x1 01/11/2018    GFRNAA 52 (L) 06/11/2021    GFRAA 60 06/11/2021    CA 9.4 06/11/2021    OSMOCALC 292 06/11/2021    ALKPHO 66 06/11/2021    AST 34 06/11/2021    ALT 51 06/11/2021    BILT 0.4 06/11/2021    TP 6.9 06/11/2021    ALB 4.1 06/11/2021    GLOBULT 1.9 pulmonary nodule, no change since June 2016, more than 3 years, benign     THORACIC AORTA:  No aneurysm or other acute process     MEDIASTINUM/AURA:  Stable right hilar lymph node 17 x 18 mm size. [facility] CARDIAC:  Unremarkable. CHEST WALL:  Unremarkable. abnormality.         =====  CONCLUSION:       1. Development of splenomegaly, with obvious change in the volume of the spleen when compared with a relatively recent CT scan of the abdomen from May 2019.  Measurements are given above, including cephalocaudal coping difficulties and social support systems and currently there are no active problems.       Rory Abbasi MD  THE MEDICAL [facility]

## 2021-06-16 DIAGNOSIS — C91.10 CLL (CHRONIC LYMPHOCYTIC LEUKEMIA) (HCC): ICD-10-CM

## 2021-06-16 DIAGNOSIS — E11.9 TYPE 2 DIABETES MELLITUS WITHOUT COMPLICATION, WITHOUT LONG-TERM CURRENT USE OF INSULIN (HCC): ICD-10-CM

## 2021-06-16 DIAGNOSIS — N52.9 ERECTILE DYSFUNCTION, UNSPECIFIED ERECTILE DYSFUNCTION TYPE: ICD-10-CM

## 2021-06-16 DIAGNOSIS — E78.2 MIXED HYPERLIPIDEMIA: ICD-10-CM

## 2021-06-16 RX ORDER — SILDENAFIL 100 MG/1
100 TABLET, FILM COATED ORAL DAILY PRN
Qty: 10 TABLET | Refills: 0 | OUTPATIENT
Start: 2021-06-16

## 2021-06-16 RX ORDER — ATORVASTATIN CALCIUM 40 MG/1
TABLET, FILM COATED ORAL
Qty: 30 TABLET | Refills: 0 | Status: SHIPPED | OUTPATIENT
Start: 2021-06-16 | End: 2021-07-13

## 2021-06-16 RX ORDER — ACYCLOVIR 400 MG/1
400 TABLET ORAL 2 TIMES DAILY
Qty: 60 TABLET | Refills: 5 | Status: SHIPPED | OUTPATIENT
Start: 2021-06-16 | End: 2021-10-12

## 2021-06-16 RX ORDER — SILDENAFIL 100 MG/1
TABLET, FILM COATED ORAL
Qty: 10 TABLET | Refills: 0 | Status: SHIPPED | OUTPATIENT
Start: 2021-06-16 | End: 2021-07-13

## 2021-06-23 ENCOUNTER — TELEPHONE (OUTPATIENT)
Dept: HEMATOLOGY/ONCOLOGY | Facility: HOSPITAL | Age: 56
End: 2021-06-23

## 2021-06-23 NOTE — TELEPHONE ENCOUNTER
Dar from [address]  called and needs to schedule patient's infusion drug. She needs to get a date of delivery.      Please call [facility] No: [phone]   Ext. [phone]

## 2021-07-06 DIAGNOSIS — C91.10 CLL (CHRONIC LYMPHOCYTIC LEUKEMIA) (HCC): ICD-10-CM

## 2021-07-07 RX ORDER — IBRUTINIB 420 MG/1
TABLET, FILM COATED ORAL
Qty: 28 TABLET | Refills: 5 | Status: SHIPPED | OUTPATIENT
Start: 2021-07-07 | End: 2021-09-07

## 2021-07-12 ENCOUNTER — TELEPHONE (OUTPATIENT)
Dept: INTERNAL MEDICINE CLINIC | Facility: CLINIC | Age: 56
End: 2021-07-12

## 2021-07-12 DIAGNOSIS — N52.9 ERECTILE DYSFUNCTION, UNSPECIFIED ERECTILE DYSFUNCTION TYPE: ICD-10-CM

## 2021-07-12 DIAGNOSIS — E11.9 TYPE 2 DIABETES MELLITUS WITHOUT COMPLICATION, WITHOUT LONG-TERM CURRENT USE OF INSULIN (HCC): ICD-10-CM

## 2021-07-12 DIAGNOSIS — I10 ESSENTIAL HYPERTENSION: ICD-10-CM

## 2021-07-12 DIAGNOSIS — E78.2 MIXED HYPERLIPIDEMIA: ICD-10-CM

## 2021-07-12 NOTE — TELEPHONE ENCOUNTER
Refill request received via fax from [facility] for:  dapagliflozin Propanediol ([facility]) 10 MG Oral Tab    Fax placed in triage for review.

## 2021-07-13 RX ORDER — ATORVASTATIN CALCIUM 40 MG/1
TABLET, FILM COATED ORAL
Qty: 30 TABLET | Refills: 0 | Status: SHIPPED | OUTPATIENT
Start: 2021-07-13 | End: 2021-08-18

## 2021-07-13 RX ORDER — BENAZEPRIL HYDROCHLORIDE 10 MG/1
TABLET ORAL
Qty: 90 TABLET | Refills: 0 | Status: SHIPPED | OUTPATIENT
Start: 2021-07-13 | End: 2021-10-12

## 2021-07-13 RX ORDER — SILDENAFIL 100 MG/1
TABLET, FILM COATED ORAL
Qty: 10 TABLET | Refills: 0 | Status: SHIPPED | OUTPATIENT
Start: 2021-07-13 | End: 2021-10-20

## 2021-07-29 ENCOUNTER — TELEPHONE (OUTPATIENT)
Dept: HEMATOLOGY/ONCOLOGY | Facility: HOSPITAL | Age: 56
End: 2021-07-29

## 2021-07-29 NOTE — TELEPHONE ENCOUNTER
Pharmacy calling.   Asking if ready to set up deliver of Medication to Infusion center  Immune Globulin, Human, (GAMMAGARD) 20 GM/200ML Injection Solution    Call Back Number   [phone]

## 2021-08-06 ENCOUNTER — OFFICE VISIT (OUTPATIENT)
Dept: HEMATOLOGY/ONCOLOGY | Facility: HOSPITAL | Age: 56
End: 2021-08-06
Attending: INTERNAL MEDICINE
Payer: COMMERCIAL

## 2021-08-06 VITALS
BODY MASS INDEX: 30.34 KG/M2 | DIASTOLIC BLOOD PRESSURE: 71 MMHG | OXYGEN SATURATION: 98 % | TEMPERATURE: 97 F | RESPIRATION RATE: 18 BRPM | SYSTOLIC BLOOD PRESSURE: 122 MMHG | HEIGHT: 74.02 IN | WEIGHT: 236.38 LBS | HEART RATE: 72 BPM

## 2021-08-06 DIAGNOSIS — D80.1 HYPOGAMMAGLOBULINEMIA (HCC): ICD-10-CM

## 2021-08-06 DIAGNOSIS — D72.820 LYMPHOCYTOSIS: ICD-10-CM

## 2021-08-06 DIAGNOSIS — T88.7XXA MEDICATION SIDE EFFECTS: ICD-10-CM

## 2021-08-06 DIAGNOSIS — C91.10 CLL (CHRONIC LYMPHOCYTIC LEUKEMIA) (HCC): Primary | ICD-10-CM

## 2021-08-06 DIAGNOSIS — E11.9 TYPE 2 DIABETES MELLITUS WITHOUT COMPLICATION, WITHOUT LONG-TERM CURRENT USE OF INSULIN (HCC): ICD-10-CM

## 2021-08-06 DIAGNOSIS — D69.6 THROMBOCYTOPENIA (HCC): ICD-10-CM

## 2021-08-06 LAB
ALBUMIN SERPL-MCNC: 4 G/DL (ref 3.4–5)
ALBUMIN/GLOB SERPL: 1.6 {RATIO} (ref 1–2)
ALP LIVER SERPL-CCNC: 50 U/L
ALT SERPL-CCNC: 49 U/L
ANION GAP SERPL CALC-SCNC: 5 MMOL/L (ref 0–18)
AST SERPL-CCNC: 22 U/L (ref 15–37)
BASOPHILS # BLD AUTO: 0.07 X10(3) UL (ref 0–0.2)
BASOPHILS NFR BLD AUTO: 1 %
BILIRUB SERPL-MCNC: 0.4 MG/DL (ref 0.1–2)
BUN BLD-MCNC: 9 MG/DL (ref 7–18)
CALCIUM BLD-MCNC: 8.9 MG/DL (ref 8.5–10.1)
CHLORIDE SERPL-SCNC: 106 MMOL/L (ref 98–112)
CHOLEST SMN-MCNC: 132 MG/DL (ref ?–200)
CO2 SERPL-SCNC: 26 MMOL/L (ref 21–32)
CREAT BLD-MCNC: 0.74 MG/DL
EOSINOPHIL # BLD AUTO: 0.08 X10(3) UL (ref 0–0.7)
EOSINOPHIL NFR BLD AUTO: 1.2 %
ERYTHROCYTE [DISTWIDTH] IN BLOOD BY AUTOMATED COUNT: 12.2 %
GLOBULIN PLAS-MCNC: 2.5 G/DL (ref 2.8–4.4)
GLUCOSE BLD-MCNC: 180 MG/DL (ref 70–99)
HCT VFR BLD AUTO: 44.8 %
HDLC SERPL-MCNC: 42 MG/DL (ref 40–59)
HGB BLD-MCNC: 15.5 G/DL
IMM GRANULOCYTES # BLD AUTO: 0.01 X10(3) UL (ref 0–1)
IMM GRANULOCYTES NFR BLD: 0.1 %
IMMUNOGLOBULIN PNL SER-MCNC: 317 MG/DL (ref 791–1643)
LDH SERPL L TO P-CCNC: 100 U/L
LDLC SERPL CALC-MCNC: 57 MG/DL (ref ?–100)
LYMPHOCYTES # BLD AUTO: 2.31 X10(3) UL (ref 1–4)
LYMPHOCYTES NFR BLD AUTO: 34.4 %
M PROTEIN MFR SERPL ELPH: 6.5 G/DL (ref 6.4–8.2)
MCH RBC QN AUTO: 30.4 PG (ref 26–34)
MCHC RBC AUTO-ENTMCNC: 34.6 G/DL (ref 31–37)
MCV RBC AUTO: 87.8 FL
MONOCYTES # BLD AUTO: 0.51 X10(3) UL (ref 0.1–1)
MONOCYTES NFR BLD AUTO: 7.6 %
NEUTROPHILS # BLD AUTO: 3.73 X10 (3) UL (ref 1.5–7.7)
NEUTROPHILS # BLD AUTO: 3.73 X10(3) UL (ref 1.5–7.7)
NEUTROPHILS NFR BLD AUTO: 55.7 %
NONHDLC SERPL-MCNC: 90 MG/DL (ref ?–130)
OSMOLALITY SERPL CALC.SUM OF ELEC: 287 MOSM/KG (ref 275–295)
PATIENT FASTING Y/N/NP: NO
PATIENT FASTING Y/N/NP: YES
PLATELET # BLD AUTO: 142 10(3)UL (ref 150–450)
POTASSIUM SERPL-SCNC: 4 MMOL/L (ref 3.5–5.1)
RBC # BLD AUTO: 5.1 X10(6)UL
SODIUM SERPL-SCNC: 137 MMOL/L (ref 136–145)
TRIGL SERPL-MCNC: 201 MG/DL (ref 30–149)
VLDLC SERPL CALC-MCNC: 29 MG/DL (ref 0–30)
WBC # BLD AUTO: 6.7 X10(3) UL (ref 4–11)

## 2021-08-06 PROCEDURE — 80061 LIPID PANEL: CPT

## 2021-08-06 PROCEDURE — 99215 OFFICE O/P EST HI 40 MIN: CPT | Performed by: INTERNAL MEDICINE

## 2021-08-06 PROCEDURE — 85025 COMPLETE CBC W/AUTO DIFF WBC: CPT

## 2021-08-06 PROCEDURE — 80053 COMPREHEN METABOLIC PANEL: CPT

## 2021-08-06 PROCEDURE — 83615 LACTATE (LD) (LDH) ENZYME: CPT

## 2021-08-06 PROCEDURE — 96366 THER/PROPH/DIAG IV INF ADDON: CPT

## 2021-08-06 PROCEDURE — 82784 ASSAY IGA/IGD/IGG/IGM EACH: CPT

## 2021-08-06 PROCEDURE — 96365 THER/PROPH/DIAG IV INF INIT: CPT

## 2021-08-06 RX ORDER — DIPHENHYDRAMINE HCL 25 MG
25 CAPSULE ORAL ONCE
Status: COMPLETED | OUTPATIENT
Start: 2021-08-06 | End: 2021-08-06

## 2021-08-06 RX ORDER — ACETAMINOPHEN 325 MG/1
650 TABLET ORAL ONCE
Status: COMPLETED | OUTPATIENT
Start: 2021-08-06 | End: 2021-08-06

## 2021-08-06 RX ORDER — ACETAMINOPHEN 325 MG/1
650 TABLET ORAL ONCE
Status: CANCELLED | OUTPATIENT
Start: 2021-09-17

## 2021-08-06 RX ORDER — DIPHENHYDRAMINE HCL 25 MG
25 CAPSULE ORAL ONCE
Status: CANCELLED | OUTPATIENT
Start: 2021-09-17

## 2021-08-06 RX ADMIN — ACETAMINOPHEN 650 MG: 325 TABLET ORAL at 08:13:00

## 2021-08-06 RX ADMIN — DIPHENHYDRAMINE HCL 25 MG: 25 MG CAPSULE ORAL at 08:13:00

## 2021-08-06 NOTE — PROGRESS NOTES
Cancer Center Progress Note    Patient Name: Arlen Guzman   YOB: 1965   Medical Record Number: [id number]   CSN: [id number]   Attending Physician: Massiel Murray M.D.    Referring Physician: Cole Reeves MD      Date of Visit: 8/6/2021     C showed splenomegaly with decrease in known nodes. Slowly improved and was discharged home 9/7/19.     - Resumed Ibrutinib 10/4/19      History of Present Illness:  Continues on Ibrutinib. Feels fairly well.  Does note some fatigue, rash and joint aches that TABLET BY MOUTH EVERY SIX HOURS AS NEEDED FOR NAUSEA, Disp: 30 tablet, Rfl: 3  •  folic acid 1 MG Oral Tab, Take by mouth daily. , Disp: , Rfl:   •  B Complex Vitamins (B COMPLEX 1 OR), Take by mouth., Disp: , Rfl:   •  Cyanocobalamin (VITAMIN B 12 OR), Nell Vazquez status: Current Every Day Smoker        Years: 20.00        Types: Pipe      Smokeless tobacco: Never Used      Tobacco comment: pipes and cigars    Vaping Use      Vaping Use: Never used    Substance and Sexual Activity      Alcohol use:  Yes        Alcoho SHORTNESS OF BREATH  Mold                    SHORTNESS OF BREATH  Pollen                  SHORTNESS OF BREATH     Review of Systems:  A 14-point ROS was done with pertinent positives and negative per the HPI    Vital Signs:  Height: 188 cm (6' 2.02\") (0 2. 0    FASTING No    LDH    Collection Time: 08/06/21  7:58 AM   Result Value Ref Range     87 - 241 U/L   IMMUNOGLOBULIN G, QUANT    Collection Time: 08/06/21  7:58 AM   Result Value Ref Range    Immunoglobulin G 317 (L) 791-1,643 mg/dL   CBC W/ DI Registry) which   includes the Dose Index Registry. PATIENT STATED HISTORY:(As transcribed by Technologist)  Inpatient. Patient presents with fever, bodyache and back pain.   Hx CLL      CONTRAST USED:  100cc of Omnipaque 350     FINDINGS:    CHEST: time of the study. No sign of dilation of the small bowel. No ascites, free   air, colitis, diverticulitis or excessive stool. Appendix is visualized, normal.        ABDOMINAL WALL:  Unremarkable. URINARY BLADDER:  Unremarkable.       PELVIC NODES:  U have stayed in the 400s. As has not had recent severe infections for some time started spacing out infusions to Q 8 weeks. He is aware of signs and symptoms to watch out for. IgG levels have dropped some but still w/o severe/recurrent infections.      5. D

## 2021-08-06 NOTE — PROGRESS NOTES
Per Dr. Antonia Patiño, patient will now be receiving IVIG every 8 weeks. Patient's next appt scheduled for 10/1/2021. MD visit needed in 4 months. Insurance authorization expires on 10/15/2021. Message sent to billing to extend authorization.     Education Record

## 2021-08-18 DIAGNOSIS — E11.9 TYPE 2 DIABETES MELLITUS WITHOUT COMPLICATION, WITHOUT LONG-TERM CURRENT USE OF INSULIN (HCC): ICD-10-CM

## 2021-08-18 DIAGNOSIS — E78.2 MIXED HYPERLIPIDEMIA: ICD-10-CM

## 2021-08-18 RX ORDER — ATORVASTATIN CALCIUM 40 MG/1
TABLET, FILM COATED ORAL
Qty: 30 TABLET | Refills: 0 | Status: SHIPPED | OUTPATIENT
Start: 2021-08-18 | End: 2021-09-13

## 2021-08-18 RX ORDER — GLIMEPIRIDE 4 MG/1
TABLET ORAL
Qty: 180 TABLET | Refills: 0 | Status: SHIPPED | OUTPATIENT
Start: 2021-08-18 | End: 2021-10-12

## 2021-09-07 DIAGNOSIS — C91.10 CLL (CHRONIC LYMPHOCYTIC LEUKEMIA) (HCC): ICD-10-CM

## 2021-09-07 RX ORDER — IBRUTINIB 420 MG/1
1 TABLET, FILM COATED ORAL EVERY MORNING
Qty: 30 TABLET | Refills: 5 | Status: SHIPPED | OUTPATIENT
Start: 2021-09-07

## 2021-09-13 DIAGNOSIS — E11.9 TYPE 2 DIABETES MELLITUS WITHOUT COMPLICATION, WITHOUT LONG-TERM CURRENT USE OF INSULIN (HCC): ICD-10-CM

## 2021-09-13 DIAGNOSIS — E78.2 MIXED HYPERLIPIDEMIA: ICD-10-CM

## 2021-09-13 RX ORDER — DAPAGLIFLOZIN 10 MG/1
TABLET, FILM COATED ORAL
Qty: 30 TABLET | Refills: 0 | Status: SHIPPED | OUTPATIENT
Start: 2021-09-13 | End: 2021-10-12

## 2021-09-13 RX ORDER — ATORVASTATIN CALCIUM 40 MG/1
TABLET, FILM COATED ORAL
Qty: 30 TABLET | Refills: 0 | Status: SHIPPED | OUTPATIENT
Start: 2021-09-13 | End: 2021-10-12

## 2021-09-17 ENCOUNTER — APPOINTMENT (OUTPATIENT)
Dept: HEMATOLOGY/ONCOLOGY | Facility: HOSPITAL | Age: 56
End: 2021-09-17
Attending: INTERNAL MEDICINE
Payer: COMMERCIAL

## 2021-10-01 ENCOUNTER — OFFICE VISIT (OUTPATIENT)
Dept: HEMATOLOGY/ONCOLOGY | Facility: HOSPITAL | Age: 56
End: 2021-10-01
Attending: INTERNAL MEDICINE
Payer: COMMERCIAL

## 2021-10-01 VITALS
DIASTOLIC BLOOD PRESSURE: 80 MMHG | WEIGHT: 236.38 LBS | SYSTOLIC BLOOD PRESSURE: 128 MMHG | TEMPERATURE: 96 F | BODY MASS INDEX: 30.34 KG/M2 | HEART RATE: 74 BPM | HEIGHT: 74.02 IN | RESPIRATION RATE: 16 BRPM

## 2021-10-01 DIAGNOSIS — C91.10 CLL (CHRONIC LYMPHOCYTIC LEUKEMIA) (HCC): Primary | ICD-10-CM

## 2021-10-01 DIAGNOSIS — D80.1 HYPOGAMMAGLOBULINEMIA (HCC): ICD-10-CM

## 2021-10-01 PROCEDURE — 96366 THER/PROPH/DIAG IV INF ADDON: CPT

## 2021-10-01 PROCEDURE — 85025 COMPLETE CBC W/AUTO DIFF WBC: CPT

## 2021-10-01 PROCEDURE — 80053 COMPREHEN METABOLIC PANEL: CPT

## 2021-10-01 PROCEDURE — 96365 THER/PROPH/DIAG IV INF INIT: CPT

## 2021-10-01 PROCEDURE — 82784 ASSAY IGA/IGD/IGG/IGM EACH: CPT

## 2021-10-01 PROCEDURE — 83615 LACTATE (LD) (LDH) ENZYME: CPT

## 2021-10-01 RX ORDER — DIPHENHYDRAMINE HCL 25 MG
25 CAPSULE ORAL ONCE
Status: COMPLETED | OUTPATIENT
Start: 2021-10-01 | End: 2021-10-01

## 2021-10-01 RX ORDER — ACETAMINOPHEN 325 MG/1
650 TABLET ORAL ONCE
Status: COMPLETED | OUTPATIENT
Start: 2021-10-01 | End: 2021-10-01

## 2021-10-01 RX ORDER — DIPHENHYDRAMINE HCL 25 MG
25 CAPSULE ORAL ONCE
Status: CANCELLED | OUTPATIENT
Start: 2021-11-26

## 2021-10-01 RX ORDER — ACETAMINOPHEN 325 MG/1
650 TABLET ORAL ONCE
Status: CANCELLED | OUTPATIENT
Start: 2021-11-26

## 2021-10-01 RX ADMIN — ACETAMINOPHEN 650 MG: 325 TABLET ORAL at 08:13:00

## 2021-10-01 RX ADMIN — DIPHENHYDRAMINE HCL 25 MG: 25 MG CAPSULE ORAL at 08:14:00

## 2021-10-01 NOTE — PROGRESS NOTES
Education Record    Learner:  Patient    Disease / Warden [place]    Barriers / Limitations:  None   Comments:    Method:  Discussion   Comments:    General Topics:  Medication and Side effects and symptom management   Comments:    Outcome

## 2021-10-12 DIAGNOSIS — C91.10 CLL (CHRONIC LYMPHOCYTIC LEUKEMIA) (HCC): ICD-10-CM

## 2021-10-12 DIAGNOSIS — E11.9 TYPE 2 DIABETES MELLITUS WITHOUT COMPLICATION, WITHOUT LONG-TERM CURRENT USE OF INSULIN (HCC): ICD-10-CM

## 2021-10-12 DIAGNOSIS — I10 ESSENTIAL HYPERTENSION: ICD-10-CM

## 2021-10-12 DIAGNOSIS — C91.10 CLL (CHRONIC LYMPHOCYTIC LEUKEMIA) (HCC): Primary | ICD-10-CM

## 2021-10-12 DIAGNOSIS — E78.2 MIXED HYPERLIPIDEMIA: ICD-10-CM

## 2021-10-12 RX ORDER — GLIMEPIRIDE 4 MG/1
4 TABLET ORAL 2 TIMES DAILY WITH MEALS
Qty: 180 TABLET | Refills: 0 | Status: SHIPPED | OUTPATIENT
Start: 2021-10-12 | End: 2021-10-20

## 2021-10-12 RX ORDER — BENAZEPRIL HYDROCHLORIDE 10 MG/1
10 TABLET ORAL DAILY
Qty: 90 TABLET | Refills: 0 | OUTPATIENT
Start: 2021-10-12

## 2021-10-12 RX ORDER — DAPAGLIFLOZIN 10 MG/1
TABLET, FILM COATED ORAL
Qty: 30 TABLET | Refills: 0 | Status: SHIPPED | OUTPATIENT
Start: 2021-10-12 | End: 2021-11-12

## 2021-10-12 RX ORDER — FLUCONAZOLE 100 MG/1
TABLET ORAL
Qty: 30 TABLET | Refills: 3 | Status: SHIPPED | OUTPATIENT
Start: 2021-10-12 | End: 2021-10-12

## 2021-10-12 RX ORDER — ATORVASTATIN CALCIUM 40 MG/1
TABLET, FILM COATED ORAL
Qty: 30 TABLET | Refills: 0 | Status: SHIPPED | OUTPATIENT
Start: 2021-10-12 | End: 2021-10-20

## 2021-10-12 RX ORDER — BENAZEPRIL HYDROCHLORIDE 10 MG/1
TABLET ORAL
Qty: 90 TABLET | Refills: 0 | Status: SHIPPED | OUTPATIENT
Start: 2021-10-12 | End: 2021-10-20

## 2021-10-13 RX ORDER — ACYCLOVIR 400 MG/1
400 TABLET ORAL 2 TIMES DAILY
Qty: 60 TABLET | Refills: 5 | Status: SHIPPED | OUTPATIENT
Start: 2021-10-13 | End: 2021-11-17

## 2021-10-13 RX ORDER — FLUCONAZOLE 100 MG/1
100 TABLET ORAL DAILY
Qty: 30 TABLET | Refills: 3 | Status: SHIPPED | OUTPATIENT
Start: 2021-10-13 | End: 2021-11-17

## 2021-10-18 ENCOUNTER — TELEPHONE (OUTPATIENT)
Dept: INTERNAL MEDICINE CLINIC | Facility: CLINIC | Age: 56
End: 2021-10-18

## 2021-10-20 ENCOUNTER — PATIENT MESSAGE (OUTPATIENT)
Dept: INTERNAL MEDICINE CLINIC | Facility: CLINIC | Age: 56
End: 2021-10-20

## 2021-10-20 DIAGNOSIS — J45.40 MODERATE PERSISTENT ASTHMA WITHOUT COMPLICATION: ICD-10-CM

## 2021-10-20 DIAGNOSIS — E78.2 MIXED HYPERLIPIDEMIA: ICD-10-CM

## 2021-10-20 DIAGNOSIS — N52.9 ERECTILE DYSFUNCTION, UNSPECIFIED ERECTILE DYSFUNCTION TYPE: ICD-10-CM

## 2021-10-20 DIAGNOSIS — E11.9 TYPE 2 DIABETES MELLITUS WITHOUT COMPLICATION, WITHOUT LONG-TERM CURRENT USE OF INSULIN (HCC): ICD-10-CM

## 2021-10-20 DIAGNOSIS — I10 ESSENTIAL HYPERTENSION: ICD-10-CM

## 2021-10-20 RX ORDER — FLUTICASONE PROPIONATE 250 UG/1
POWDER, METERED RESPIRATORY (INHALATION)
Qty: 180 EACH | Refills: 1 | Status: SHIPPED | OUTPATIENT
Start: 2021-10-20

## 2021-10-20 RX ORDER — GLIMEPIRIDE 4 MG/1
4 TABLET ORAL 2 TIMES DAILY WITH MEALS
Qty: 180 TABLET | Refills: 1 | Status: SHIPPED | OUTPATIENT
Start: 2021-10-20

## 2021-10-20 RX ORDER — BENAZEPRIL HYDROCHLORIDE 10 MG/1
10 TABLET ORAL DAILY
Qty: 90 TABLET | Refills: 1 | Status: SHIPPED | OUTPATIENT
Start: 2021-10-20

## 2021-10-20 RX ORDER — SILDENAFIL 100 MG/1
TABLET, FILM COATED ORAL
Qty: 30 TABLET | Refills: 0 | Status: SHIPPED | OUTPATIENT
Start: 2021-10-20 | End: 2022-01-17

## 2021-10-20 RX ORDER — ATORVASTATIN CALCIUM 40 MG/1
40 TABLET, FILM COATED ORAL DAILY
Qty: 90 TABLET | Refills: 1 | Status: SHIPPED | OUTPATIENT
Start: 2021-10-20

## 2021-10-20 NOTE — TELEPHONE ENCOUNTER
From: Peg Matias  Sent: 10/20/2021 2:03 PM CDT  To: [facility] Clinical Staff  Subject: Mail order pharmacy     fluconazole 100 MG   Acyclovir 400 mg  Pxanpoi93 mg  Atorvastatin 40 mg  Benazepril hcl10 mg  Metformin 850  Glimepiride4 mg  Flovent discus 250 m

## 2021-10-29 ENCOUNTER — APPOINTMENT (OUTPATIENT)
Dept: HEMATOLOGY/ONCOLOGY | Facility: HOSPITAL | Age: 56
End: 2021-10-29
Attending: INTERNAL MEDICINE
Payer: COMMERCIAL

## 2021-11-12 ENCOUNTER — TELEPHONE (OUTPATIENT)
Dept: INTERNAL MEDICINE CLINIC | Facility: CLINIC | Age: 56
End: 2021-11-12

## 2021-11-12 DIAGNOSIS — Z00.00 LABORATORY EXAMINATION ORDERED AS PART OF A ROUTINE GENERAL MEDICAL EXAMINATION: Primary | ICD-10-CM

## 2021-11-17 DIAGNOSIS — C91.10 CLL (CHRONIC LYMPHOCYTIC LEUKEMIA) (HCC): ICD-10-CM

## 2021-11-17 RX ORDER — FLUCONAZOLE 100 MG/1
100 TABLET ORAL DAILY
Qty: 30 TABLET | Refills: 3 | Status: SHIPPED | OUTPATIENT
Start: 2021-11-17

## 2021-11-17 RX ORDER — ACYCLOVIR 400 MG/1
400 TABLET ORAL 2 TIMES DAILY
Qty: 60 TABLET | Refills: 5 | Status: SHIPPED | OUTPATIENT
Start: 2021-11-17

## 2021-12-03 ENCOUNTER — OFFICE VISIT (OUTPATIENT)
Dept: HEMATOLOGY/ONCOLOGY | Facility: HOSPITAL | Age: 56
End: 2021-12-03
Attending: INTERNAL MEDICINE
Payer: COMMERCIAL

## 2021-12-03 VITALS
SYSTOLIC BLOOD PRESSURE: 143 MMHG | HEART RATE: 70 BPM | RESPIRATION RATE: 16 BRPM | BODY MASS INDEX: 30.34 KG/M2 | HEIGHT: 74.02 IN | WEIGHT: 236.38 LBS | TEMPERATURE: 96 F | DIASTOLIC BLOOD PRESSURE: 81 MMHG | OXYGEN SATURATION: 99 %

## 2021-12-03 DIAGNOSIS — R79.89 BLOOD CREATININE INCREASED COMPARED WITH PRIOR MEASUREMENT: ICD-10-CM

## 2021-12-03 DIAGNOSIS — D80.1 HYPOGAMMAGLOBULINEMIA (HCC): Primary | ICD-10-CM

## 2021-12-03 DIAGNOSIS — T88.7XXA MEDICATION SIDE EFFECTS: ICD-10-CM

## 2021-12-03 DIAGNOSIS — Z00.00 LABORATORY EXAMINATION ORDERED AS PART OF A ROUTINE GENERAL MEDICAL EXAMINATION: ICD-10-CM

## 2021-12-03 DIAGNOSIS — C91.10 CLL (CHRONIC LYMPHOCYTIC LEUKEMIA) (HCC): ICD-10-CM

## 2021-12-03 DIAGNOSIS — D69.6 THROMBOCYTOPENIA (HCC): ICD-10-CM

## 2021-12-03 DIAGNOSIS — R74.01 TRANSAMINITIS: ICD-10-CM

## 2021-12-03 DIAGNOSIS — D72.820 LYMPHOCYTOSIS: ICD-10-CM

## 2021-12-03 PROCEDURE — 96365 THER/PROPH/DIAG IV INF INIT: CPT

## 2021-12-03 PROCEDURE — 80061 LIPID PANEL: CPT

## 2021-12-03 PROCEDURE — 80053 COMPREHEN METABOLIC PANEL: CPT | Performed by: INTERNAL MEDICINE

## 2021-12-03 PROCEDURE — 84439 ASSAY OF FREE THYROXINE: CPT

## 2021-12-03 PROCEDURE — 85025 COMPLETE CBC W/AUTO DIFF WBC: CPT | Performed by: INTERNAL MEDICINE

## 2021-12-03 PROCEDURE — 82784 ASSAY IGA/IGD/IGG/IGM EACH: CPT | Performed by: INTERNAL MEDICINE

## 2021-12-03 PROCEDURE — 82784 ASSAY IGA/IGD/IGG/IGM EACH: CPT

## 2021-12-03 PROCEDURE — 36415 COLL VENOUS BLD VENIPUNCTURE: CPT

## 2021-12-03 PROCEDURE — 99215 OFFICE O/P EST HI 40 MIN: CPT | Performed by: INTERNAL MEDICINE

## 2021-12-03 PROCEDURE — 83036 HEMOGLOBIN GLYCOSYLATED A1C: CPT

## 2021-12-03 PROCEDURE — 96366 THER/PROPH/DIAG IV INF ADDON: CPT

## 2021-12-03 PROCEDURE — 83615 LACTATE (LD) (LDH) ENZYME: CPT | Performed by: INTERNAL MEDICINE

## 2021-12-03 PROCEDURE — 3051F HG A1C>EQUAL 7.0%<8.0%: CPT | Performed by: INTERNAL MEDICINE

## 2021-12-03 PROCEDURE — 84443 ASSAY THYROID STIM HORMONE: CPT

## 2021-12-03 RX ORDER — ACETAMINOPHEN 325 MG/1
650 TABLET ORAL ONCE
Status: CANCELLED | OUTPATIENT
Start: 2022-01-21

## 2021-12-03 RX ORDER — ACETAMINOPHEN 325 MG/1
650 TABLET ORAL ONCE
Status: COMPLETED | OUTPATIENT
Start: 2021-12-03 | End: 2021-12-03

## 2021-12-03 RX ORDER — DIPHENHYDRAMINE HCL 25 MG
25 CAPSULE ORAL ONCE
Status: COMPLETED | OUTPATIENT
Start: 2021-12-03 | End: 2021-12-03

## 2021-12-03 RX ORDER — DIPHENHYDRAMINE HCL 25 MG
25 CAPSULE ORAL ONCE
Status: CANCELLED | OUTPATIENT
Start: 2022-01-21

## 2021-12-03 RX ADMIN — ACETAMINOPHEN 650 MG: 325 TABLET ORAL at 08:36:00

## 2021-12-03 RX ADMIN — DIPHENHYDRAMINE HCL 25 MG: 25 MG CAPSULE ORAL at 08:37:00

## 2021-12-03 NOTE — PROGRESS NOTES
Education Record    Learner:  Patient    Disease / Diagnosis:IVIG infusion    Barriers / Limitations:  None   Comments:    Method:  Discussion   Comments:    General Topics:  Medication and Plan of care reviewed   Comments:    Outcome:  Shows understanding

## 2021-12-03 NOTE — PROGRESS NOTES
Cancer Center Progress Note    Patient Name: Maggie Mott   YOB: 1965   Medical Record Number: [id number]   CSN: [id number]   Attending Physician: Gisella Trejo M.D.    Referring Physician: Acosta Haynes MD      Date of Visit: 12/3/2021 10/4/19      History of Present Illness:  Doing ok. Some fatigue and alternating episodes of \"gout arthritis\" with certain joints- maonly fingers. No bleeding, palpitations or recent infections. Some bruising mainly when his cats scratch him.  Denies SOB, Take by mouth daily. , Disp: , Rfl:   •  B Complex Vitamins (B COMPLEX 1 OR), Take by mouth., Disp: , Rfl:   •  Cyanocobalamin (VITAMIN B 12 OR), Take by mouth., Disp: , Rfl:   •  Albuterol Sulfate HFA (PROAIR HFA) 108 (90 Base) MCG/ACT Inhalation Aero Soln Tobacco comment: pipes and cigars    Vaping Use      Vaping Use: Never used    Substance and Sexual Activity      Alcohol use:  Yes        Alcohol/week: 3.0 standard drinks        Types: 3 Cans of beer per week        Comment: 4-6 Beers/week      Drug use: hepatosplenomegaly. No palpable mass. Extremities: Pedal pulses are present. Some BLE edema. Scratches especially extremities- scabbed over and not infected. Not actively bleeding but some with bruising  Neurological: Grossly intact.         Laboratory:  R Absolute 1.90 1.00 - 4.00 x10(3) uL    Monocyte Absolute 0.55 0.10 - 1.00 x10(3) uL    Eosinophil Absolute 0.06 0.00 - 0.70 x10(3) uL    Basophil Absolute 0.07 0.00 - 0.20 x10(3) uL    Immature Granulocyte Absolute 0.02 0.00 - 1.00 x10(3) uL    Neutrophil AORTA:  No aneurysm or other acute process     MEDIASTINUM/AURA:  Stable right hilar lymph node 17 x 18 mm size. Ferne Aimee CARDIAC:  Unremarkable. CHEST WALL:  Unremarkable.      OTHER:  Bilateral axillary lymph nodes have decreased considerably in size, wit obvious change in the volume of the spleen when compared with a relatively recent CT scan of the abdomen from May 2019. Measurements are given above, including cephalocaudal dimension of 17.2 cm, previously 13.1 cm.     Enhancement of the spleen is homogene systems and currently there are no active problems.       Lisa Ye MD  THE MEDICAL [facility]

## 2021-12-09 ENCOUNTER — OFFICE VISIT (OUTPATIENT)
Dept: INTERNAL MEDICINE CLINIC | Facility: CLINIC | Age: 56
End: 2021-12-09
Payer: COMMERCIAL

## 2021-12-09 VITALS
HEART RATE: 70 BPM | WEIGHT: 235 LBS | HEIGHT: 74 IN | SYSTOLIC BLOOD PRESSURE: 130 MMHG | BODY MASS INDEX: 30.16 KG/M2 | RESPIRATION RATE: 14 BRPM | DIASTOLIC BLOOD PRESSURE: 70 MMHG | TEMPERATURE: 98 F

## 2021-12-09 DIAGNOSIS — Z00.00 ANNUAL PHYSICAL EXAM: Primary | ICD-10-CM

## 2021-12-09 DIAGNOSIS — Z12.11 COLON CANCER SCREENING: ICD-10-CM

## 2021-12-09 DIAGNOSIS — J01.00 ACUTE NON-RECURRENT MAXILLARY SINUSITIS: ICD-10-CM

## 2021-12-09 DIAGNOSIS — E11.9 TYPE 2 DIABETES MELLITUS WITHOUT COMPLICATION, WITHOUT LONG-TERM CURRENT USE OF INSULIN (HCC): ICD-10-CM

## 2021-12-09 PROCEDURE — 3008F BODY MASS INDEX DOCD: CPT | Performed by: INTERNAL MEDICINE

## 2021-12-09 PROCEDURE — 3078F DIAST BP <80 MM HG: CPT | Performed by: INTERNAL MEDICINE

## 2021-12-09 PROCEDURE — 3075F SYST BP GE 130 - 139MM HG: CPT | Performed by: INTERNAL MEDICINE

## 2021-12-09 PROCEDURE — 99396 PREV VISIT EST AGE 40-64: CPT | Performed by: INTERNAL MEDICINE

## 2021-12-09 RX ORDER — AZITHROMYCIN 250 MG/1
TABLET, FILM COATED ORAL
Qty: 6 TABLET | Refills: 0 | Status: SHIPPED | OUTPATIENT
Start: 2021-12-09 | End: 2021-12-14

## 2021-12-09 RX ORDER — BENZONATATE 200 MG/1
200 CAPSULE ORAL 3 TIMES DAILY PRN
Qty: 30 CAPSULE | Refills: 0 | Status: SHIPPED | OUTPATIENT
Start: 2021-12-09

## 2021-12-09 NOTE — PROGRESS NOTES
Subjective:   Patient ID: Arlen Guzman is a 64year old male.     HPI  Arlen Guzman is a 64year old male who presents for a complete physical exam.   HPI:   Pt complains of sinus pain x 1 weeks  No fever  No hypoglycemia  No issues with meds    PAST MEDICA Results   Component Value Date    ALT 41 12/03/2021    ALT 48 10/01/2021    ALT 49 08/06/2021     Lab Results   Component Value Date    PSA 0.33 04/07/2018    PSA 0.549 10/08/2016    PSA 0.466 10/03/2015        Current Outpatient Medications   Medication S COMPLEX 1 OR) Take by mouth. • Cyanocobalamin (VITAMIN B 12 OR) Take by mouth.      • Albuterol Sulfate HFA (PROAIR HFA) 108 (90 Base) MCG/ACT Inhalation Aero Soln INHALE 2 PUFFS BY MOUTH EVERY SIX HOURS AS NEEDED for wheezing 8.5 g 2   • aspirin 81 MG vision  HEENT: denies nasal congestion,+ sinus pain   LUNGS: denies shortness of breath with exertion  CARDIOVASCULAR: denies chest pain on exertion  GI: denies abdominal pain,denies heartburn  : denies nocturia or changes in stream  MUSCULOSKELETAL: den sinusitis   The patient indicates understanding of these issues and agrees to the plan. The patient is asked to return for CPX in 15 m but rtc in 6 m for med check.     History/Other:   Review of Systems  Current Outpatient Medications   Medication Sig Dis OR) Take by mouth. • Cyanocobalamin (VITAMIN B 12 OR) Take by mouth.      • Albuterol Sulfate HFA (PROAIR HFA) 108 (90 Base) MCG/ACT Inhalation Aero Soln INHALE 2 PUFFS BY MOUTH EVERY SIX HOURS AS NEEDED for wheezing 8.5 g 2   • aspirin 81 MG Oral Tab T

## 2021-12-21 ENCOUNTER — TELEPHONE (OUTPATIENT)
Dept: HEMATOLOGY/ONCOLOGY | Facility: HOSPITAL | Age: 56
End: 2021-12-21

## 2021-12-21 NOTE — TELEPHONE ENCOUNTER
[facility] called. They wanted to schedule another for the gammagard. They need specifics on his treatment, medication, dates of treatment's etc.  Please call when able. Thank you.

## 2022-01-17 DIAGNOSIS — N52.9 ERECTILE DYSFUNCTION, UNSPECIFIED ERECTILE DYSFUNCTION TYPE: ICD-10-CM

## 2022-01-17 RX ORDER — SILDENAFIL 100 MG/1
TABLET, FILM COATED ORAL
Qty: 30 TABLET | Refills: 3 | Status: SHIPPED | OUTPATIENT
Start: 2022-01-17

## 2022-01-22 DIAGNOSIS — E11.9 TYPE 2 DIABETES MELLITUS WITHOUT COMPLICATION, WITHOUT LONG-TERM CURRENT USE OF INSULIN (HCC): ICD-10-CM

## 2022-01-22 NOTE — TELEPHONE ENCOUNTER
Refill Req    [facility]    [phone]    dapagliflozin (FARXIGA) 5 MG Oral Tab  90 Day  W/ 3 refills

## 2022-01-24 NOTE — TELEPHONE ENCOUNTER
PA required for refill. Will be sent electronically when medication is sent per refill.      Last time medication was refilled 11/12/21  Quantity and number of refills 90 w/ 1  Last OV 12/9/21  Next OV 6/11/22

## 2022-01-31 ENCOUNTER — TELEPHONE (OUTPATIENT)
Dept: HEMATOLOGY/ONCOLOGY | Facility: HOSPITAL | Age: 57
End: 2022-01-31

## 2022-01-31 NOTE — TELEPHONE ENCOUNTER
Jyoti Mercedes from [facility] called to see if the patient needed a refill gammagard? Please call to advise.

## 2022-02-02 ENCOUNTER — TELEPHONE (OUTPATIENT)
Dept: HEMATOLOGY/ONCOLOGY | Facility: HOSPITAL | Age: 57
End: 2022-02-02

## 2022-02-02 ENCOUNTER — DOCUMENTATION ONLY (OUTPATIENT)
Dept: HEMATOLOGY/ONCOLOGY | Facility: HOSPITAL | Age: 57
End: 2022-02-02

## 2022-02-03 ENCOUNTER — APPOINTMENT (OUTPATIENT)
Dept: HEMATOLOGY/ONCOLOGY | Facility: HOSPITAL | Age: 57
End: 2022-02-03
Attending: INTERNAL MEDICINE
Payer: COMMERCIAL

## 2022-02-04 ENCOUNTER — LAB ENCOUNTER (OUTPATIENT)
Dept: LAB | Facility: HOSPITAL | Age: 57
End: 2022-02-04
Attending: INTERNAL MEDICINE
Payer: COMMERCIAL

## 2022-02-04 ENCOUNTER — OFFICE VISIT (OUTPATIENT)
Dept: HEMATOLOGY/ONCOLOGY | Facility: HOSPITAL | Age: 57
End: 2022-02-04
Attending: INTERNAL MEDICINE
Payer: COMMERCIAL

## 2022-02-04 VITALS
HEIGHT: 74.02 IN | WEIGHT: 245.81 LBS | RESPIRATION RATE: 16 BRPM | DIASTOLIC BLOOD PRESSURE: 73 MMHG | HEART RATE: 86 BPM | SYSTOLIC BLOOD PRESSURE: 117 MMHG | BODY MASS INDEX: 31.55 KG/M2 | OXYGEN SATURATION: 97 % | TEMPERATURE: 97 F

## 2022-02-04 DIAGNOSIS — D69.6 THROMBOCYTOPENIA (HCC): Primary | ICD-10-CM

## 2022-02-04 DIAGNOSIS — D80.1 HYPOGAMMAGLOBULINEMIA (HCC): ICD-10-CM

## 2022-02-04 DIAGNOSIS — C91.10 CLL (CHRONIC LYMPHOCYTIC LEUKEMIA) (HCC): ICD-10-CM

## 2022-02-04 DIAGNOSIS — C91.10 CLL (CHRONIC LYMPHOCYTIC LEUKEMIA) (HCC): Primary | ICD-10-CM

## 2022-02-04 DIAGNOSIS — J00 HEAD COLD: ICD-10-CM

## 2022-02-04 LAB
ALBUMIN SERPL-MCNC: 3.6 G/DL (ref 3.4–5)
ALBUMIN/GLOB SERPL: 1.3 {RATIO} (ref 1–2)
ALP LIVER SERPL-CCNC: 63 U/L
ANION GAP SERPL CALC-SCNC: 5 MMOL/L (ref 0–18)
AST SERPL-CCNC: 23 U/L (ref 15–37)
BASOPHILS # BLD AUTO: 0.09 X10(3) UL (ref 0–0.2)
BASOPHILS NFR BLD AUTO: 1.3 %
BILIRUB SERPL-MCNC: 0.2 MG/DL (ref 0.1–2)
BUN BLD-MCNC: 15 MG/DL (ref 7–18)
CALCIUM BLD-MCNC: 8.9 MG/DL (ref 8.5–10.1)
CHLORIDE SERPL-SCNC: 109 MMOL/L (ref 98–112)
CO2 SERPL-SCNC: 25 MMOL/L (ref 21–32)
CREAT BLD-MCNC: 0.93 MG/DL
EOSINOPHIL # BLD AUTO: 0.23 X10(3) UL (ref 0–0.7)
EOSINOPHIL NFR BLD AUTO: 3.4 %
ERYTHROCYTE [DISTWIDTH] IN BLOOD BY AUTOMATED COUNT: 12.4 %
FASTING STATUS PATIENT QL REPORTED: NO
GLOBULIN PLAS-MCNC: 2.7 G/DL (ref 2.8–4.4)
GLUCOSE BLD-MCNC: 146 MG/DL (ref 70–99)
HCT VFR BLD AUTO: 45 %
HGB BLD-MCNC: 14.7 G/DL
IGA SERPL-MCNC: 45.4 MG/DL (ref 70–312)
IGM SERPL-MCNC: 17.9 MG/DL (ref 43–279)
IMM GRANULOCYTES # BLD AUTO: 0.01 X10(3) UL (ref 0–1)
IMM GRANULOCYTES NFR BLD: 0.1 %
IMMUNOGLOBULIN PNL SER-MCNC: 299 MG/DL (ref 791–1643)
LDH SERPL L TO P-CCNC: 160 U/L
LYMPHOCYTES # BLD AUTO: 1.83 X10(3) UL (ref 1–4)
LYMPHOCYTES NFR BLD AUTO: 26.8 %
MCH RBC QN AUTO: 29.7 PG (ref 26–34)
MCHC RBC AUTO-ENTMCNC: 32.7 G/DL (ref 31–37)
MONOCYTES # BLD AUTO: 0.59 X10(3) UL (ref 0.1–1)
MONOCYTES NFR BLD AUTO: 8.6 %
NEUTROPHILS # BLD AUTO: 4.09 X10 (3) UL (ref 1.5–7.7)
NEUTROPHILS # BLD AUTO: 4.09 X10(3) UL (ref 1.5–7.7)
NEUTROPHILS NFR BLD AUTO: 59.8 %
OSMOLALITY SERPL CALC.SUM OF ELEC: 291 MOSM/KG (ref 275–295)
PLATELET # BLD AUTO: 139 10(3)UL (ref 150–450)
POTASSIUM SERPL-SCNC: 4.2 MMOL/L (ref 3.5–5.1)
PROT SERPL-MCNC: 6.3 G/DL (ref 6.4–8.2)
RBC # BLD AUTO: 4.95 X10(6)UL
SODIUM SERPL-SCNC: 139 MMOL/L (ref 136–145)
WBC # BLD AUTO: 6.8 X10(3) UL (ref 4–11)

## 2022-02-04 PROCEDURE — 96365 THER/PROPH/DIAG IV INF INIT: CPT

## 2022-02-04 PROCEDURE — 96366 THER/PROPH/DIAG IV INF ADDON: CPT

## 2022-02-04 PROCEDURE — 99215 OFFICE O/P EST HI 40 MIN: CPT | Performed by: INTERNAL MEDICINE

## 2022-02-04 RX ORDER — AZITHROMYCIN 250 MG/1
TABLET, FILM COATED ORAL
Qty: 1 TABLET | Refills: 0 | Status: SHIPPED | OUTPATIENT
Start: 2022-02-04

## 2022-02-04 RX ORDER — ACETAMINOPHEN 325 MG/1
650 TABLET ORAL ONCE
Status: COMPLETED | OUTPATIENT
Start: 2022-02-04 | End: 2022-02-04

## 2022-02-04 RX ORDER — BENZONATATE 100 MG/1
100 CAPSULE ORAL 3 TIMES DAILY PRN
Qty: 30 CAPSULE | Refills: 0 | Status: SHIPPED | OUTPATIENT
Start: 2022-02-04

## 2022-02-04 RX ORDER — DIPHENHYDRAMINE HCL 25 MG
25 CAPSULE ORAL ONCE
Status: COMPLETED | OUTPATIENT
Start: 2022-02-04 | End: 2022-02-04

## 2022-02-04 RX ORDER — ACETAMINOPHEN 325 MG/1
650 TABLET ORAL ONCE
Status: CANCELLED | OUTPATIENT
Start: 2022-03-25

## 2022-02-04 RX ORDER — DIPHENHYDRAMINE HCL 25 MG
25 CAPSULE ORAL ONCE
Status: CANCELLED | OUTPATIENT
Start: 2022-03-25

## 2022-02-04 RX ADMIN — DIPHENHYDRAMINE HCL 25 MG: 25 MG CAPSULE ORAL at 13:28:00

## 2022-02-04 RX ADMIN — ACETAMINOPHEN 650 MG: 325 TABLET ORAL at 13:28:00

## 2022-02-04 NOTE — PROGRESS NOTES
Education Record    Learner:  Patient    Disease / Diagnosis: CLL: IVIG     Barriers / Limitations:  None   Comments:    Method:  Brief focused and Discussion   Comments:    General Topics:  Side effects and symptom management   Comments:    Outcome:  Shows understanding   Comments:

## 2022-02-05 LAB — SARS-COV-2 RNA RESP QL NAA+PROBE: NOT DETECTED

## 2022-02-11 ENCOUNTER — OFFICE VISIT (OUTPATIENT)
Dept: HEMATOLOGY/ONCOLOGY | Facility: HOSPITAL | Age: 57
End: 2022-02-11
Attending: INTERNAL MEDICINE
Payer: COMMERCIAL

## 2022-02-11 VITALS
RESPIRATION RATE: 18 BRPM | TEMPERATURE: 98 F | OXYGEN SATURATION: 98 % | HEART RATE: 90 BPM | DIASTOLIC BLOOD PRESSURE: 74 MMHG | SYSTOLIC BLOOD PRESSURE: 136 MMHG

## 2022-02-11 DIAGNOSIS — C91.10 CLL (CHRONIC LYMPHOCYTIC LEUKEMIA) (HCC): Primary | ICD-10-CM

## 2022-02-11 RX ORDER — ACETAMINOPHEN 325 MG/1
650 TABLET ORAL ONCE
OUTPATIENT
Start: 2022-04-01

## 2022-02-11 RX ORDER — DIPHENHYDRAMINE HCL 25 MG
25 CAPSULE ORAL ONCE
OUTPATIENT
Start: 2022-04-01

## 2022-02-11 NOTE — PROGRESS NOTES
Education Record    Learner:  Patient    Disease / Diagnosis: pre-exposure prophylaxis for prevention of COVID-19    Barriers / Limitations:  None   Comments:    Method:  Discussion and EVUSHELD Fact Sheet   Comments:    General Topics:  Medication, Procedure and Side effects and symptom management   Comments:    Outcome:  Shows understanding   Comments:    Evusheld given into bilateral upper outer gluteal muscles. Patient monitored for 60 minutes post injections and no s/sx of adverse reaction.

## 2022-02-14 RX ORDER — IBRUTINIB 420 MG/1
1 TABLET, FILM COATED ORAL EVERY MORNING
Qty: 30 TABLET | Refills: 5 | Status: SHIPPED | OUTPATIENT
Start: 2022-02-14

## 2022-03-14 RX ORDER — FLUCONAZOLE 100 MG/1
TABLET ORAL
Qty: 30 TABLET | Refills: 11 | Status: SHIPPED | OUTPATIENT
Start: 2022-03-14

## 2022-03-21 ENCOUNTER — TELEPHONE (OUTPATIENT)
Dept: HEMATOLOGY/ONCOLOGY | Facility: HOSPITAL | Age: 57
End: 2022-03-21

## 2022-03-21 NOTE — TELEPHONE ENCOUNTER
Called and left a message for patient regarding the new FDA dosing recommendations for Evusheld. Initially the FDA recommended that the dose be 300 mg, which is what was given to the patient on 2/11, but now the recommendation is a total of 600 mg. Instructed patient to call this RN back if he has questions about the additional dose of 300 mg or to call [phone], ask for the infusion  to set up an appt for the injection.

## 2022-03-23 ENCOUNTER — TELEPHONE (OUTPATIENT)
Dept: HEMATOLOGY/ONCOLOGY | Facility: HOSPITAL | Age: 57
End: 2022-03-23

## 2022-03-23 NOTE — TELEPHONE ENCOUNTER
Patient was returning Doreen's call regarding billing for Dec 2021 and February 2022. Please call when able. Thank you.

## 2022-03-24 ENCOUNTER — HOSPITAL ENCOUNTER (OUTPATIENT)
Age: 57
Discharge: HOME | End: 2022-03-24
Payer: COMMERCIAL

## 2022-03-24 VITALS
DIASTOLIC BLOOD PRESSURE: 75 MMHG | HEART RATE: 82 BPM | OXYGEN SATURATION: 96 % | TEMPERATURE: 98 F | SYSTOLIC BLOOD PRESSURE: 149 MMHG | RESPIRATION RATE: 17 BRPM

## 2022-03-24 DIAGNOSIS — S05.02XA ABRASION OF LEFT CORNEA, INITIAL ENCOUNTER: Primary | ICD-10-CM

## 2022-03-24 PROCEDURE — 99213 OFFICE O/P EST LOW 20 MIN: CPT

## 2022-03-24 RX ORDER — ERYTHROMYCIN 5 MG/G
1 OINTMENT OPHTHALMIC EVERY 6 HOURS
Qty: 1 G | Refills: 0 | Status: SHIPPED | OUTPATIENT
Start: 2022-03-24 | End: 2022-03-31

## 2022-03-24 RX ORDER — TETRACAINE HYDROCHLORIDE 5 MG/ML
1 SOLUTION OPHTHALMIC ONCE
Status: COMPLETED | OUTPATIENT
Start: 2022-03-24 | End: 2022-03-24

## 2022-03-24 NOTE — ED INITIAL ASSESSMENT (HPI)
Patient presents to IC with c/o left eye pain x one week. States started with headache behind left eye.+redness and light sensitivity. Had used  prior to and may have gotten debrie in it. Up to date on tdap. Not a contact lens wearer.

## 2022-03-29 ENCOUNTER — PATIENT MESSAGE (OUTPATIENT)
Dept: HEMATOLOGY/ONCOLOGY | Facility: HOSPITAL | Age: 57
End: 2022-03-29

## 2022-03-29 RX ORDER — ONDANSETRON HYDROCHLORIDE 8 MG/1
8 TABLET, FILM COATED ORAL EVERY 8 HOURS PRN
Qty: 30 TABLET | Refills: 3 | Status: SHIPPED | OUTPATIENT
Start: 2022-03-29

## 2022-03-30 RX ORDER — GLIMEPIRIDE 4 MG/1
TABLET ORAL
Qty: 180 TABLET | Refills: 3 | OUTPATIENT
Start: 2022-03-30

## 2022-03-30 RX ORDER — ATORVASTATIN CALCIUM 40 MG/1
TABLET, FILM COATED ORAL
Qty: 90 TABLET | Refills: 3 | OUTPATIENT
Start: 2022-03-30

## 2022-03-31 ENCOUNTER — TELEPHONE (OUTPATIENT)
Dept: INTERNAL MEDICINE CLINIC | Facility: CLINIC | Age: 57
End: 2022-03-31

## 2022-03-31 NOTE — TELEPHONE ENCOUNTER
Refill Req    Patient had a difficult time refilling from his Oct   Refill.  He is almost out of medications     Qty: 3 mo     glimepiride 4 MG Oral Tab    atorvastatin 40 MG Oral Tab    metFORMIN 850 MG Oral Tab    [facility]

## 2022-04-01 ENCOUNTER — OFFICE VISIT (OUTPATIENT)
Dept: HEMATOLOGY/ONCOLOGY | Facility: HOSPITAL | Age: 57
End: 2022-04-01
Attending: INTERNAL MEDICINE
Payer: COMMERCIAL

## 2022-04-01 VITALS
HEART RATE: 89 BPM | OXYGEN SATURATION: 98 % | SYSTOLIC BLOOD PRESSURE: 133 MMHG | TEMPERATURE: 97 F | RESPIRATION RATE: 18 BRPM | DIASTOLIC BLOOD PRESSURE: 79 MMHG

## 2022-04-01 DIAGNOSIS — C91.10 CLL (CHRONIC LYMPHOCYTIC LEUKEMIA) (HCC): Primary | ICD-10-CM

## 2022-04-01 RX ORDER — ACETAMINOPHEN 325 MG/1
650 TABLET ORAL ONCE
OUTPATIENT
Start: 2022-05-27

## 2022-04-01 RX ORDER — DIPHENHYDRAMINE HCL 25 MG
25 CAPSULE ORAL ONCE
OUTPATIENT
Start: 2022-05-27

## 2022-04-15 RX ORDER — BENAZEPRIL HYDROCHLORIDE 10 MG/1
TABLET ORAL
Qty: 90 TABLET | Refills: 0 | Status: SHIPPED | OUTPATIENT
Start: 2022-04-15

## 2022-04-18 ENCOUNTER — APPOINTMENT (OUTPATIENT)
Dept: GENERAL RADIOLOGY | Age: 57
End: 2022-04-18
Attending: PHYSICIAN ASSISTANT
Payer: COMMERCIAL

## 2022-04-18 ENCOUNTER — HOSPITAL ENCOUNTER (OUTPATIENT)
Age: 57
Discharge: HOME | End: 2022-04-18
Payer: COMMERCIAL

## 2022-04-18 VITALS
WEIGHT: 240 LBS | BODY MASS INDEX: 30.8 KG/M2 | DIASTOLIC BLOOD PRESSURE: 75 MMHG | HEART RATE: 83 BPM | HEIGHT: 74 IN | SYSTOLIC BLOOD PRESSURE: 145 MMHG | TEMPERATURE: 98 F | RESPIRATION RATE: 18 BRPM | OXYGEN SATURATION: 97 %

## 2022-04-18 DIAGNOSIS — R07.81 RIB PAIN ON LEFT SIDE: Primary | ICD-10-CM

## 2022-04-18 PROCEDURE — 99213 OFFICE O/P EST LOW 20 MIN: CPT

## 2022-04-18 PROCEDURE — 71101 X-RAY EXAM UNILAT RIBS/CHEST: CPT | Performed by: PHYSICIAN ASSISTANT

## 2022-04-18 RX ORDER — CYCLOBENZAPRINE HCL 10 MG
10 TABLET ORAL 3 TIMES DAILY PRN
Qty: 20 TABLET | Refills: 0 | Status: SHIPPED | OUTPATIENT
Start: 2022-04-18 | End: 2022-04-25

## 2022-04-18 NOTE — ED INITIAL ASSESSMENT (HPI)
Pt was leaning over the washing machine while, and he leaned a little too far he  trying to get a t-shirt out from behind the washer.   He heard a pop and has now felt pain to the left side of his ribs ever since

## 2022-04-27 ENCOUNTER — TELEPHONE (OUTPATIENT)
Dept: HEMATOLOGY/ONCOLOGY | Facility: HOSPITAL | Age: 57
End: 2022-04-27

## 2022-04-29 ENCOUNTER — OFFICE VISIT (OUTPATIENT)
Dept: HEMATOLOGY/ONCOLOGY | Facility: HOSPITAL | Age: 57
End: 2022-04-29
Attending: INTERNAL MEDICINE
Payer: COMMERCIAL

## 2022-04-29 VITALS
HEART RATE: 91 BPM | WEIGHT: 245 LBS | OXYGEN SATURATION: 97 % | SYSTOLIC BLOOD PRESSURE: 127 MMHG | DIASTOLIC BLOOD PRESSURE: 78 MMHG | TEMPERATURE: 96 F | BODY MASS INDEX: 31.44 KG/M2 | RESPIRATION RATE: 16 BRPM | HEIGHT: 74.02 IN

## 2022-04-29 DIAGNOSIS — C91.10 CLL (CHRONIC LYMPHOCYTIC LEUKEMIA) (HCC): ICD-10-CM

## 2022-04-29 DIAGNOSIS — C91.10 CLL (CHRONIC LYMPHOCYTIC LEUKEMIA) (HCC): Primary | ICD-10-CM

## 2022-04-29 DIAGNOSIS — D80.1 HYPOGAMMAGLOBULINEMIA (HCC): ICD-10-CM

## 2022-04-29 LAB
ALBUMIN SERPL-MCNC: 3.8 G/DL (ref 3.4–5)
ALBUMIN/GLOB SERPL: 1.4 {RATIO} (ref 1–2)
ALP LIVER SERPL-CCNC: 67 U/L
ALT SERPL-CCNC: 37 U/L
ANION GAP SERPL CALC-SCNC: 7 MMOL/L (ref 0–18)
AST SERPL-CCNC: 25 U/L (ref 15–37)
BASOPHILS # BLD AUTO: 0.1 X10(3) UL (ref 0–0.2)
BASOPHILS NFR BLD AUTO: 1.3 %
BILIRUB SERPL-MCNC: 0.2 MG/DL (ref 0.1–2)
BUN BLD-MCNC: 21 MG/DL (ref 7–18)
CALCIUM BLD-MCNC: 9.1 MG/DL (ref 8.5–10.1)
CHLORIDE SERPL-SCNC: 106 MMOL/L (ref 98–112)
CO2 SERPL-SCNC: 24 MMOL/L (ref 21–32)
CREAT BLD-MCNC: 0.99 MG/DL
EOSINOPHIL # BLD AUTO: 0.22 X10(3) UL (ref 0–0.7)
EOSINOPHIL NFR BLD AUTO: 2.9 %
ERYTHROCYTE [DISTWIDTH] IN BLOOD BY AUTOMATED COUNT: 12 %
GLOBULIN PLAS-MCNC: 2.7 G/DL (ref 2.8–4.4)
GLUCOSE BLD-MCNC: 172 MG/DL (ref 70–99)
HCT VFR BLD AUTO: 45.4 %
HGB BLD-MCNC: 15.2 G/DL
IGA SERPL-MCNC: 48.3 MG/DL (ref 70–312)
IGM SERPL-MCNC: 9.8 MG/DL (ref 43–279)
IMM GRANULOCYTES # BLD AUTO: 0.03 X10(3) UL (ref 0–1)
IMM GRANULOCYTES NFR BLD: 0.4 %
IMMUNOGLOBULIN PNL SER-MCNC: 262 MG/DL (ref 791–1643)
LDH SERPL L TO P-CCNC: 134 U/L
LYMPHOCYTES # BLD AUTO: 2.38 X10(3) UL (ref 1–4)
LYMPHOCYTES NFR BLD AUTO: 30.9 %
MCH RBC QN AUTO: 30.6 PG (ref 26–34)
MCHC RBC AUTO-ENTMCNC: 33.5 G/DL (ref 31–37)
MCV RBC AUTO: 91.5 FL
MONOCYTES # BLD AUTO: 0.59 X10(3) UL (ref 0.1–1)
MONOCYTES NFR BLD AUTO: 7.7 %
NEUTROPHILS # BLD AUTO: 4.39 X10 (3) UL (ref 1.5–7.7)
NEUTROPHILS # BLD AUTO: 4.39 X10(3) UL (ref 1.5–7.7)
NEUTROPHILS NFR BLD AUTO: 56.8 %
OSMOLALITY SERPL CALC.SUM OF ELEC: 291 MOSM/KG (ref 275–295)
PLATELET # BLD AUTO: 160 10(3)UL (ref 150–450)
POTASSIUM SERPL-SCNC: 4.2 MMOL/L (ref 3.5–5.1)
PROT SERPL-MCNC: 6.5 G/DL (ref 6.4–8.2)
RBC # BLD AUTO: 4.96 X10(6)UL
SODIUM SERPL-SCNC: 137 MMOL/L (ref 136–145)
WBC # BLD AUTO: 7.7 X10(3) UL (ref 4–11)

## 2022-04-29 PROCEDURE — 99214 OFFICE O/P EST MOD 30 MIN: CPT | Performed by: CLINICAL NURSE SPECIALIST

## 2022-04-29 PROCEDURE — 96365 THER/PROPH/DIAG IV INF INIT: CPT

## 2022-04-29 PROCEDURE — 96366 THER/PROPH/DIAG IV INF ADDON: CPT

## 2022-04-29 RX ORDER — ATORVASTATIN CALCIUM 40 MG/1
40 TABLET, FILM COATED ORAL DAILY
Qty: 90 TABLET | Refills: 1 | Status: SHIPPED | OUTPATIENT
Start: 2022-04-29

## 2022-04-29 RX ORDER — ACETAMINOPHEN 325 MG/1
650 TABLET ORAL ONCE
OUTPATIENT
Start: 2022-05-27

## 2022-04-29 RX ORDER — ACETAMINOPHEN 325 MG/1
650 TABLET ORAL ONCE
Status: COMPLETED | OUTPATIENT
Start: 2022-04-29 | End: 2022-04-29

## 2022-04-29 RX ORDER — DIPHENHYDRAMINE HCL 25 MG
25 CAPSULE ORAL ONCE
Status: COMPLETED | OUTPATIENT
Start: 2022-04-29 | End: 2022-04-29

## 2022-04-29 RX ORDER — GLIMEPIRIDE 4 MG/1
4 TABLET ORAL 2 TIMES DAILY WITH MEALS
Qty: 180 TABLET | Refills: 1 | Status: SHIPPED | OUTPATIENT
Start: 2022-04-29

## 2022-04-29 RX ORDER — DIPHENHYDRAMINE HCL 25 MG
25 CAPSULE ORAL ONCE
OUTPATIENT
Start: 2022-05-27

## 2022-04-29 RX ADMIN — DIPHENHYDRAMINE HCL 25 MG: 25 MG CAPSULE ORAL at 13:43:00

## 2022-04-29 RX ADMIN — ACETAMINOPHEN 650 MG: 325 TABLET ORAL at 13:43:00

## 2022-04-29 NOTE — TELEPHONE ENCOUNTER
Metformin  Last time medication was refilled 1/17/22  Quantity and # of refills 180 tab 1 refill  Last OV 12/9/21  Next OV 6/11/22    Glimepiride  Last time medication was refilled 1/17/22  Quantity and # of refills 180 tab 1 refill  Last OV 12/9/21  Next OV 6/11/22

## 2022-04-29 NOTE — TELEPHONE ENCOUNTER
Last office visit: (if over 1 year, schedule appointment)  Future Appointments   Date Time Provider Tsering Eaton   4/29/2022  1:00 PM Neha Rodriguez, [facility] [address]   4/29/2022  1:15 PM [address] [place] [address]   6/11/2022 10:00 AM Nima Ruvalcaba MD [facility] [address]   9/30/2022  3:00  [address]     Appointment scheduled with:  Dr. Tiki Lou    Requested medication:   3 month supply:  Atorvastatin  Glimepiride  Metformin    Pharmacy:  Rik Acuna, [facility] [phone], [phone]

## 2022-04-29 NOTE — PROGRESS NOTES
Education Record    Learner:  Patient    Disease / Diagnosis: Here for IVIG    Barriers / Limitations:  None   Comments:    Method:  Discussion   Comments:    General Topics:  Plan of care reviewed   Comments:    Outcome:  Shows understanding   Comments:    IVIG infused per MD orders without incident. Tolerated well. Reviewed next appointment. Declined printed AVS. Discharged home in stable condition, no new complaints.

## 2022-04-29 NOTE — PROGRESS NOTES
Patient presents with: Follow - Up: APN assessment - oral chemo follow up    Pt is here for oral chemo follow up - bruvica. Pt reports that he feels tired all the time; is ready to sleep by early afternoon. Had an injury last week that caused pain to his left side; had xrays and is now on flexeril for pain management. Feels like he gets full earlier; denies N,V,D. Has occasional constipation that resolves on its own.     Education Record    Learner:  Patient    Disease / Diagnosis: CLL    Barriers / Limitations:  None   Comments:    Method:  Brief focused   Comments:    General Topics:  Diet, Medication, Pain, Side effects and symptom management and Plan of care reviewed   Comments:    Outcome:  Shows understanding   Comments:

## 2022-05-02 RX ORDER — AZITHROMYCIN 250 MG/1
TABLET, FILM COATED ORAL
Qty: 1 TABLET | Refills: 1 | Status: SHIPPED | OUTPATIENT
Start: 2022-05-02

## 2022-05-02 RX ORDER — ACYCLOVIR 400 MG/1
400 TABLET ORAL 2 TIMES DAILY
Qty: 180 TABLET | Refills: 1 | Status: SHIPPED | OUTPATIENT
Start: 2022-05-02

## 2022-05-24 ENCOUNTER — TELEPHONE (OUTPATIENT)
Dept: INTERNAL MEDICINE CLINIC | Facility: CLINIC | Age: 57
End: 2022-05-24

## 2022-05-24 DIAGNOSIS — E11.9 TYPE 2 DIABETES MELLITUS WITHOUT COMPLICATION, WITHOUT LONG-TERM CURRENT USE OF INSULIN (HCC): Primary | ICD-10-CM

## 2022-05-24 DIAGNOSIS — I10 ESSENTIAL HYPERTENSION: ICD-10-CM

## 2022-05-24 NOTE — TELEPHONE ENCOUNTER
Orders placed for 6 month f/u labs. Patient contacted.      Future Appointments   Date Time Provider Tsering Uma   6/11/2022 10:00 AM Kala Card MD [facility] [address]   7/22/2022 11:15 AM Evan Trevino MD [facility]on [facility]   7/22/2022 11:30 AM [address] [place] [address]   9/30/2022  3:00  Evan Bower [address] [place] [address]

## 2022-06-04 ENCOUNTER — LAB ENCOUNTER (OUTPATIENT)
Dept: LAB | Age: 57
End: 2022-06-04
Attending: INTERNAL MEDICINE
Payer: COMMERCIAL

## 2022-06-04 DIAGNOSIS — E11.9 TYPE 2 DIABETES MELLITUS WITHOUT COMPLICATION, WITHOUT LONG-TERM CURRENT USE OF INSULIN (HCC): ICD-10-CM

## 2022-06-04 DIAGNOSIS — I10 ESSENTIAL HYPERTENSION: ICD-10-CM

## 2022-06-04 LAB
ALBUMIN SERPL-MCNC: 4.1 G/DL (ref 3.4–5)
ALBUMIN/GLOB SERPL: 1.6 {RATIO} (ref 1–2)
ALP LIVER SERPL-CCNC: 60 U/L
ALT SERPL-CCNC: 41 U/L
ANION GAP SERPL CALC-SCNC: 7 MMOL/L (ref 0–18)
AST SERPL-CCNC: 27 U/L (ref 15–37)
BILIRUB SERPL-MCNC: 0.6 MG/DL (ref 0.1–2)
BUN BLD-MCNC: 14 MG/DL (ref 7–18)
CALCIUM BLD-MCNC: 9.5 MG/DL (ref 8.5–10.1)
CHLORIDE SERPL-SCNC: 102 MMOL/L (ref 98–112)
CHOLEST SERPL-MCNC: 112 MG/DL (ref ?–200)
CO2 SERPL-SCNC: 28 MMOL/L (ref 21–32)
CREAT BLD-MCNC: 1.03 MG/DL
CREAT UR-SCNC: 104 MG/DL
EST. AVERAGE GLUCOSE BLD GHB EST-MCNC: 169 MG/DL (ref 68–126)
FASTING PATIENT LIPID ANSWER: YES
FASTING STATUS PATIENT QL REPORTED: YES
GLOBULIN PLAS-MCNC: 2.6 G/DL (ref 2.8–4.4)
GLUCOSE BLD-MCNC: 185 MG/DL (ref 70–99)
HBA1C MFR BLD: 7.5 % (ref ?–5.7)
HDLC SERPL-MCNC: 37 MG/DL (ref 40–59)
LDLC SERPL CALC-MCNC: 48 MG/DL (ref ?–100)
MICROALBUMIN UR-MCNC: 5.53 MG/DL
MICROALBUMIN/CREAT 24H UR-RTO: 53.2 UG/MG (ref ?–30)
NONHDLC SERPL-MCNC: 75 MG/DL (ref ?–130)
OSMOLALITY SERPL CALC.SUM OF ELEC: 289 MOSM/KG (ref 275–295)
POTASSIUM SERPL-SCNC: 4.4 MMOL/L (ref 3.5–5.1)
PROT SERPL-MCNC: 6.7 G/DL (ref 6.4–8.2)
SODIUM SERPL-SCNC: 137 MMOL/L (ref 136–145)
TRIGL SERPL-MCNC: 160 MG/DL (ref 30–149)
VLDLC SERPL CALC-MCNC: 23 MG/DL (ref 0–30)

## 2022-06-04 PROCEDURE — 82043 UR ALBUMIN QUANTITATIVE: CPT | Performed by: INTERNAL MEDICINE

## 2022-06-04 PROCEDURE — 80053 COMPREHEN METABOLIC PANEL: CPT | Performed by: INTERNAL MEDICINE

## 2022-06-04 PROCEDURE — 3061F NEG MICROALBUMINURIA REV: CPT | Performed by: INTERNAL MEDICINE

## 2022-06-04 PROCEDURE — 3060F POS MICROALBUMINURIA REV: CPT | Performed by: INTERNAL MEDICINE

## 2022-06-04 PROCEDURE — 82570 ASSAY OF URINE CREATININE: CPT | Performed by: INTERNAL MEDICINE

## 2022-06-04 PROCEDURE — 83036 HEMOGLOBIN GLYCOSYLATED A1C: CPT | Performed by: INTERNAL MEDICINE

## 2022-06-04 PROCEDURE — 80061 LIPID PANEL: CPT | Performed by: INTERNAL MEDICINE

## 2022-06-10 ENCOUNTER — TELEPHONE (OUTPATIENT)
Dept: INTERNAL MEDICINE CLINIC | Facility: CLINIC | Age: 57
End: 2022-06-10

## 2022-06-10 DIAGNOSIS — E11.9 TYPE 2 DIABETES MELLITUS WITHOUT COMPLICATION, WITHOUT LONG-TERM CURRENT USE OF INSULIN (HCC): Primary | ICD-10-CM

## 2022-06-10 NOTE — TELEPHONE ENCOUNTER
OV scheduled for 6/9 cancelled due to personal reasons. Patient requesting repeat A1C lab draw, prior to rescheduled appt on 7/28/22. Per patient would like to begin on life style changes and exercise, prior to scheduled appointment, to see if he can get his A1C number down. Notified patient, will order repeat A1C. Patient states has appt with Dr. Malathi Canales- will have additional labs drawn after visit.     Placed repeat A1C with lab comment- please draw with lab work ordered by Dr. Malathi Canales

## 2022-06-26 DIAGNOSIS — I10 ESSENTIAL HYPERTENSION: ICD-10-CM

## 2022-06-27 ENCOUNTER — MED REC SCAN ONLY (OUTPATIENT)
Dept: INTERNAL MEDICINE CLINIC | Facility: CLINIC | Age: 57
End: 2022-06-27

## 2022-06-27 RX ORDER — BENAZEPRIL HYDROCHLORIDE 10 MG/1
TABLET ORAL
Qty: 90 TABLET | Refills: 3 | Status: SHIPPED | OUTPATIENT
Start: 2022-06-27

## 2022-07-02 DIAGNOSIS — J45.40 MODERATE PERSISTENT ASTHMA WITHOUT COMPLICATION: ICD-10-CM

## 2022-07-05 RX ORDER — FLUTICASONE PROPIONATE 250 UG/1
POWDER, METERED RESPIRATORY (INHALATION)
Qty: 18 EACH | Refills: 3 | Status: SHIPPED | OUTPATIENT
Start: 2022-07-05

## 2022-07-22 ENCOUNTER — OFFICE VISIT (OUTPATIENT)
Dept: HEMATOLOGY/ONCOLOGY | Facility: HOSPITAL | Age: 57
End: 2022-07-22
Attending: INTERNAL MEDICINE
Payer: COMMERCIAL

## 2022-07-22 VITALS
WEIGHT: 236 LBS | BODY MASS INDEX: 30.29 KG/M2 | HEART RATE: 83 BPM | DIASTOLIC BLOOD PRESSURE: 110 MMHG | RESPIRATION RATE: 16 BRPM | OXYGEN SATURATION: 98 % | HEIGHT: 74.02 IN | SYSTOLIC BLOOD PRESSURE: 155 MMHG | TEMPERATURE: 96 F

## 2022-07-22 DIAGNOSIS — C91.10 CLL (CHRONIC LYMPHOCYTIC LEUKEMIA) (HCC): ICD-10-CM

## 2022-07-22 DIAGNOSIS — C91.10 CLL (CHRONIC LYMPHOCYTIC LEUKEMIA) (HCC): Primary | ICD-10-CM

## 2022-07-22 DIAGNOSIS — E11.9 TYPE 2 DIABETES MELLITUS WITHOUT COMPLICATION, WITHOUT LONG-TERM CURRENT USE OF INSULIN (HCC): ICD-10-CM

## 2022-07-22 DIAGNOSIS — D80.1 HYPOGAMMAGLOBULINEMIA (HCC): ICD-10-CM

## 2022-07-22 LAB
ALBUMIN SERPL-MCNC: 4.3 G/DL (ref 3.4–5)
ALBUMIN/GLOB SERPL: 1.5 {RATIO} (ref 1–2)
ALP LIVER SERPL-CCNC: 65 U/L
ALT SERPL-CCNC: 39 U/L
ANION GAP SERPL CALC-SCNC: 8 MMOL/L (ref 0–18)
AST SERPL-CCNC: 14 U/L (ref 15–37)
BASOPHILS # BLD AUTO: 0.11 X10(3) UL (ref 0–0.2)
BASOPHILS NFR BLD AUTO: 1.4 %
BILIRUB SERPL-MCNC: 0.4 MG/DL (ref 0.1–2)
BUN BLD-MCNC: 16 MG/DL (ref 7–18)
CALCIUM BLD-MCNC: 9.5 MG/DL (ref 8.5–10.1)
CHLORIDE SERPL-SCNC: 107 MMOL/L (ref 98–112)
CO2 SERPL-SCNC: 23 MMOL/L (ref 21–32)
CREAT BLD-MCNC: 0.88 MG/DL
EOSINOPHIL # BLD AUTO: 0.16 X10(3) UL (ref 0–0.7)
EOSINOPHIL NFR BLD AUTO: 2 %
ERYTHROCYTE [DISTWIDTH] IN BLOOD BY AUTOMATED COUNT: 12.1 %
EST. AVERAGE GLUCOSE BLD GHB EST-MCNC: 171 MG/DL (ref 68–126)
GLOBULIN PLAS-MCNC: 2.8 G/DL (ref 2.8–4.4)
GLUCOSE BLD-MCNC: 154 MG/DL (ref 70–99)
HBA1C MFR BLD: 7.6 % (ref ?–5.7)
HCT VFR BLD AUTO: 49.7 %
HGB BLD-MCNC: 16.5 G/DL
IGA SERPL-MCNC: 52.8 MG/DL (ref 70–312)
IGM SERPL-MCNC: 8.4 MG/DL (ref 43–279)
IMM GRANULOCYTES # BLD AUTO: 0.01 X10(3) UL (ref 0–1)
IMM GRANULOCYTES NFR BLD: 0.1 %
IMMUNOGLOBULIN PNL SER-MCNC: 285 MG/DL (ref 791–1643)
LDH SERPL L TO P-CCNC: 129 U/L
LYMPHOCYTES # BLD AUTO: 2.34 X10(3) UL (ref 1–4)
LYMPHOCYTES NFR BLD AUTO: 28.7 %
MCH RBC QN AUTO: 30.2 PG (ref 26–34)
MCHC RBC AUTO-ENTMCNC: 33.2 G/DL (ref 31–37)
MCV RBC AUTO: 90.9 FL
MONOCYTES # BLD AUTO: 0.64 X10(3) UL (ref 0.1–1)
MONOCYTES NFR BLD AUTO: 7.9 %
NEUTROPHILS # BLD AUTO: 4.88 X10 (3) UL (ref 1.5–7.7)
NEUTROPHILS # BLD AUTO: 4.88 X10(3) UL (ref 1.5–7.7)
NEUTROPHILS NFR BLD AUTO: 59.9 %
OSMOLALITY SERPL CALC.SUM OF ELEC: 290 MOSM/KG (ref 275–295)
PLATELET # BLD AUTO: 156 10(3)UL (ref 150–450)
POTASSIUM SERPL-SCNC: 4 MMOL/L (ref 3.5–5.1)
PROT SERPL-MCNC: 7.1 G/DL (ref 6.4–8.2)
RBC # BLD AUTO: 5.47 X10(6)UL
SODIUM SERPL-SCNC: 138 MMOL/L (ref 136–145)
WBC # BLD AUTO: 8.1 X10(3) UL (ref 4–11)

## 2022-07-22 PROCEDURE — 96366 THER/PROPH/DIAG IV INF ADDON: CPT

## 2022-07-22 PROCEDURE — 96365 THER/PROPH/DIAG IV INF INIT: CPT

## 2022-07-22 PROCEDURE — 3051F HG A1C>EQUAL 7.0%<8.0%: CPT | Performed by: INTERNAL MEDICINE

## 2022-07-22 PROCEDURE — 99214 OFFICE O/P EST MOD 30 MIN: CPT | Performed by: NURSE PRACTITIONER

## 2022-07-22 RX ORDER — ACETAMINOPHEN 325 MG/1
650 TABLET ORAL ONCE
Status: CANCELLED | OUTPATIENT
Start: 2022-08-19

## 2022-07-22 RX ORDER — ACETAMINOPHEN 325 MG/1
650 TABLET ORAL ONCE
Status: COMPLETED | OUTPATIENT
Start: 2022-07-22 | End: 2022-07-22

## 2022-07-22 RX ORDER — DIPHENHYDRAMINE HCL 25 MG
25 CAPSULE ORAL ONCE
Status: CANCELLED | OUTPATIENT
Start: 2022-08-19

## 2022-07-22 RX ORDER — DIPHENHYDRAMINE HCL 25 MG
25 CAPSULE ORAL ONCE
Status: COMPLETED | OUTPATIENT
Start: 2022-07-22 | End: 2022-07-22

## 2022-07-22 RX ADMIN — DIPHENHYDRAMINE HCL 25 MG: 25 MG CAPSULE ORAL at 12:42:00

## 2022-07-22 RX ADMIN — ACETAMINOPHEN 650 MG: 325 TABLET ORAL at 12:42:00

## 2022-07-22 NOTE — PROGRESS NOTES
Education Record    Learner:  Patient    Disease / Diagnosis: Hypogammaglobulinemia    Barriers / Limitations:  None   Comments:    Method:  Brief focused and Reinforcement   Comments:    General Topics:  Plan of care reviewed   Comments:    Outcome:  Shows understanding   Comments:  Patient does his IVIG now every 12 weeks.

## 2022-07-22 NOTE — PROGRESS NOTES
Patient presents with: Follow - Up      Patient here for apn assessment prior to labs and IVIG/-daily Imbruvica. Patient is doing well energy level is improving- denies N/V/C occasional diarrhea but believes this is due to metformin.        Learner:  Patient    Disease / Diagnosis: CLL    Barriers / Limitations:  None   Comments:    Method:  Brief focused   Comments:    General Topics:  Side effects and symptom management   Comments:    Outcome:  Shows understanding   Comments:

## 2022-07-28 ENCOUNTER — OFFICE VISIT (OUTPATIENT)
Dept: INTERNAL MEDICINE CLINIC | Facility: CLINIC | Age: 57
End: 2022-07-28
Payer: COMMERCIAL

## 2022-07-28 VITALS
OXYGEN SATURATION: 96 % | DIASTOLIC BLOOD PRESSURE: 78 MMHG | HEIGHT: 74 IN | HEART RATE: 84 BPM | WEIGHT: 238 LBS | SYSTOLIC BLOOD PRESSURE: 128 MMHG | TEMPERATURE: 98 F | BODY MASS INDEX: 30.54 KG/M2 | RESPIRATION RATE: 16 BRPM

## 2022-07-28 DIAGNOSIS — I10 ESSENTIAL HYPERTENSION: ICD-10-CM

## 2022-07-28 DIAGNOSIS — Z23 NEED FOR PNEUMOCOCCAL VACCINATION: ICD-10-CM

## 2022-07-28 DIAGNOSIS — E78.2 MIXED HYPERLIPIDEMIA: ICD-10-CM

## 2022-07-28 DIAGNOSIS — E11.9 TYPE 2 DIABETES MELLITUS WITHOUT COMPLICATION, WITHOUT LONG-TERM CURRENT USE OF INSULIN (HCC): Primary | ICD-10-CM

## 2022-07-28 DIAGNOSIS — Z12.11 COLON CANCER SCREENING: ICD-10-CM

## 2022-07-28 PROCEDURE — 90677 PCV20 VACCINE IM: CPT | Performed by: INTERNAL MEDICINE

## 2022-07-28 PROCEDURE — 3078F DIAST BP <80 MM HG: CPT | Performed by: INTERNAL MEDICINE

## 2022-07-28 PROCEDURE — 90471 IMMUNIZATION ADMIN: CPT | Performed by: INTERNAL MEDICINE

## 2022-07-28 PROCEDURE — 99214 OFFICE O/P EST MOD 30 MIN: CPT | Performed by: INTERNAL MEDICINE

## 2022-07-28 PROCEDURE — 3074F SYST BP LT 130 MM HG: CPT | Performed by: INTERNAL MEDICINE

## 2022-07-28 PROCEDURE — 3008F BODY MASS INDEX DOCD: CPT | Performed by: INTERNAL MEDICINE

## 2022-08-01 DIAGNOSIS — C91.10 CLL (CHRONIC LYMPHOCYTIC LEUKEMIA) (HCC): ICD-10-CM

## 2022-08-01 RX ORDER — IBRUTINIB 420 MG/1
1 TABLET, FILM COATED ORAL EVERY MORNING
Qty: 30 TABLET | Refills: 5 | Status: SHIPPED | OUTPATIENT
Start: 2022-08-01

## 2022-08-02 DIAGNOSIS — C91.10 CLL (CHRONIC LYMPHOCYTIC LEUKEMIA) (HCC): ICD-10-CM

## 2022-08-02 RX ORDER — ACYCLOVIR 400 MG/1
TABLET ORAL
Qty: 180 TABLET | Refills: 1 | Status: SHIPPED | OUTPATIENT
Start: 2022-08-02

## 2022-08-12 ENCOUNTER — APPOINTMENT (OUTPATIENT)
Dept: HEMATOLOGY/ONCOLOGY | Facility: HOSPITAL | Age: 57
End: 2022-08-12
Attending: INTERNAL MEDICINE
Payer: COMMERCIAL

## 2022-09-20 ENCOUNTER — TELEPHONE (OUTPATIENT)
Dept: HEMATOLOGY/ONCOLOGY | Facility: HOSPITAL | Age: 57
End: 2022-09-20

## 2022-09-20 NOTE — TELEPHONE ENCOUNTER
Bri Black is calling because she need to know if the patient is still on Gammagard and who is suppyling this medication for him. Please call Bri Black she need information.

## 2022-09-26 DIAGNOSIS — E11.9 TYPE 2 DIABETES MELLITUS WITHOUT COMPLICATION, WITHOUT LONG-TERM CURRENT USE OF INSULIN (HCC): ICD-10-CM

## 2022-09-26 RX ORDER — DAPAGLIFLOZIN 10 MG/1
TABLET, FILM COATED ORAL
Qty: 90 TABLET | Refills: 0 | Status: SHIPPED | OUTPATIENT
Start: 2022-09-26

## 2022-09-26 NOTE — TELEPHONE ENCOUNTER
Last time medication was refilled 1/24/22  Quantity and # of refills: 90 tabs w/1 refill  Last OV: 7/28/22  Next OV   Future Appointments   Date Time Provider Tsering Eaton   10/6/2022  3:00 PM Luisa Willingham [address] [place] [address]   10/14/2022  8:00 AM [address] [place] [address]   10/14/2022 10:15 AM Christopher Lackey MD [facility]   2/9/2023  2:30 PM Brenda Oh MD [facility] [address]     Patient has upcoming appt scheduled

## 2022-09-27 ENCOUNTER — TELEPHONE (OUTPATIENT)
Dept: HEMATOLOGY/ONCOLOGY | Facility: HOSPITAL | Age: 57
End: 2022-09-27

## 2022-09-27 NOTE — TELEPHONE ENCOUNTER
Called and spoke with [facility]. They state that when they called and spoke with pt, the pt was unaware of the switch back to getting the IVIG sent FROM [facility]  [address] [place]. Confirmed with them that yes, pt gets med sent from [facility] [address] [facility]. Verbalized understanding.

## 2022-09-29 ENCOUNTER — TELEPHONE (OUTPATIENT)
Dept: HEMATOLOGY/ONCOLOGY | Facility: HOSPITAL | Age: 57
End: 2022-09-29

## 2022-09-29 NOTE — TELEPHONE ENCOUNTER
Randee Rogers is calling from [employer] and is calling about Immune Globulin, Human (Gammagard) 20 GM/200ML. He is calling to confirm information on the address of  where to ship the medication. Randee Rogers can be reached at [phone].

## 2022-09-30 ENCOUNTER — TELEPHONE (OUTPATIENT)
Dept: HEMATOLOGY/ONCOLOGY | Facility: HOSPITAL | Age: 57
End: 2022-09-30

## 2022-09-30 ENCOUNTER — APPOINTMENT (OUTPATIENT)
Dept: HEMATOLOGY/ONCOLOGY | Facility: HOSPITAL | Age: 57
End: 2022-09-30
Attending: INTERNAL MEDICINE
Payer: COMMERCIAL

## 2022-09-30 NOTE — TELEPHONE ENCOUNTER
[facility]  Requesting to speak with Adilson Roland  Regarding their previous conversation on a PA    Please return her call  21   Ext [phone]

## 2022-10-03 ENCOUNTER — TELEPHONE (OUTPATIENT)
Dept: HEMATOLOGY/ONCOLOGY | Facility: HOSPITAL | Age: 57
End: 2022-10-03

## 2022-10-06 ENCOUNTER — OFFICE VISIT (OUTPATIENT)
Dept: HEMATOLOGY/ONCOLOGY | Facility: HOSPITAL | Age: 57
End: 2022-10-06
Attending: INTERNAL MEDICINE
Payer: COMMERCIAL

## 2022-10-06 VITALS
DIASTOLIC BLOOD PRESSURE: 82 MMHG | OXYGEN SATURATION: 97 % | HEART RATE: 64 BPM | TEMPERATURE: 97 F | SYSTOLIC BLOOD PRESSURE: 155 MMHG | RESPIRATION RATE: 18 BRPM

## 2022-10-06 DIAGNOSIS — C91.10 CLL (CHRONIC LYMPHOCYTIC LEUKEMIA) (HCC): Primary | ICD-10-CM

## 2022-10-07 RX ORDER — OSELTAMIVIR PHOSPHATE 75 MG/1
75 CAPSULE ORAL DAILY
Qty: 10 CAPSULE | Refills: 0 | Status: SHIPPED | OUTPATIENT
Start: 2022-10-07 | End: 2022-10-17

## 2022-10-11 ENCOUNTER — TELEPHONE (OUTPATIENT)
Dept: HEMATOLOGY/ONCOLOGY | Facility: HOSPITAL | Age: 57
End: 2022-10-11

## 2022-10-11 NOTE — TELEPHONE ENCOUNTER
[facility]  Calling to set up 1st time shipment of     [id number] - [IG Infusion (Human)] 10%.     Please call Sheba Parrish  [phone]

## 2022-10-13 DIAGNOSIS — E78.2 MIXED HYPERLIPIDEMIA: ICD-10-CM

## 2022-10-13 DIAGNOSIS — E11.9 TYPE 2 DIABETES MELLITUS WITHOUT COMPLICATION, WITHOUT LONG-TERM CURRENT USE OF INSULIN (HCC): ICD-10-CM

## 2022-10-14 ENCOUNTER — OFFICE VISIT (OUTPATIENT)
Dept: HEMATOLOGY/ONCOLOGY | Facility: HOSPITAL | Age: 57
End: 2022-10-14
Attending: INTERNAL MEDICINE
Payer: COMMERCIAL

## 2022-10-14 VITALS
HEART RATE: 93 BPM | TEMPERATURE: 97 F | SYSTOLIC BLOOD PRESSURE: 134 MMHG | DIASTOLIC BLOOD PRESSURE: 85 MMHG | OXYGEN SATURATION: 98 % | RESPIRATION RATE: 18 BRPM

## 2022-10-14 DIAGNOSIS — D80.1 HYPOGAMMAGLOBULINEMIA (HCC): ICD-10-CM

## 2022-10-14 DIAGNOSIS — C91.10 CLL (CHRONIC LYMPHOCYTIC LEUKEMIA) (HCC): Primary | ICD-10-CM

## 2022-10-14 DIAGNOSIS — D69.6 THROMBOCYTOPENIA (HCC): ICD-10-CM

## 2022-10-14 DIAGNOSIS — R74.01 TRANSAMINITIS: ICD-10-CM

## 2022-10-14 DIAGNOSIS — T88.7XXA MEDICATION SIDE EFFECTS: ICD-10-CM

## 2022-10-14 LAB
ALBUMIN SERPL-MCNC: 4.1 G/DL (ref 3.4–5)
ALBUMIN/GLOB SERPL: 1.3 {RATIO} (ref 1–2)
ALP LIVER SERPL-CCNC: 66 U/L
ALT SERPL-CCNC: 44 U/L
ANION GAP SERPL CALC-SCNC: 7 MMOL/L (ref 0–18)
AST SERPL-CCNC: 22 U/L (ref 15–37)
BASOPHILS # BLD AUTO: 0.08 X10(3) UL (ref 0–0.2)
BASOPHILS NFR BLD AUTO: 1.1 %
BILIRUB SERPL-MCNC: 0.6 MG/DL (ref 0.1–2)
BUN BLD-MCNC: 14 MG/DL (ref 7–18)
CALCIUM BLD-MCNC: 9.7 MG/DL (ref 8.5–10.1)
CHLORIDE SERPL-SCNC: 107 MMOL/L (ref 98–112)
CO2 SERPL-SCNC: 23 MMOL/L (ref 21–32)
CREAT BLD-MCNC: 0.82 MG/DL
EOSINOPHIL # BLD AUTO: 0.1 X10(3) UL (ref 0–0.7)
EOSINOPHIL NFR BLD AUTO: 1.4 %
ERYTHROCYTE [DISTWIDTH] IN BLOOD BY AUTOMATED COUNT: 12.1 %
GFR SERPLBLD BASED ON 1.73 SQ M-ARVRAT: 102 ML/MIN/1.73M2 (ref 60–?)
GLOBULIN PLAS-MCNC: 3.1 G/DL (ref 2.8–4.4)
GLUCOSE BLD-MCNC: 175 MG/DL (ref 70–99)
HCT VFR BLD AUTO: 49.6 %
HGB BLD-MCNC: 16.5 G/DL
IGA SERPL-MCNC: 52.1 MG/DL (ref 70–312)
IGM SERPL-MCNC: 9.1 MG/DL (ref 43–279)
IMM GRANULOCYTES # BLD AUTO: 0.05 X10(3) UL (ref 0–1)
IMM GRANULOCYTES NFR BLD: 0.7 %
IMMUNOGLOBULIN PNL SER-MCNC: 278 MG/DL (ref 791–1643)
LDH SERPL L TO P-CCNC: 141 U/L
LYMPHOCYTES # BLD AUTO: 1.56 X10(3) UL (ref 1–4)
LYMPHOCYTES NFR BLD AUTO: 21.4 %
MCH RBC QN AUTO: 30.2 PG (ref 26–34)
MCHC RBC AUTO-ENTMCNC: 33.3 G/DL (ref 31–37)
MCV RBC AUTO: 90.7 FL
MONOCYTES # BLD AUTO: 0.69 X10(3) UL (ref 0.1–1)
MONOCYTES NFR BLD AUTO: 9.5 %
NEUTROPHILS # BLD AUTO: 4.81 X10 (3) UL (ref 1.5–7.7)
NEUTROPHILS # BLD AUTO: 4.81 X10(3) UL (ref 1.5–7.7)
NEUTROPHILS NFR BLD AUTO: 65.9 %
OSMOLALITY SERPL CALC.SUM OF ELEC: 289 MOSM/KG (ref 275–295)
PLATELET # BLD AUTO: 137 10(3)UL (ref 150–450)
POTASSIUM SERPL-SCNC: 4.2 MMOL/L (ref 3.5–5.1)
PROT SERPL-MCNC: 7.2 G/DL (ref 6.4–8.2)
RBC # BLD AUTO: 5.47 X10(6)UL
SODIUM SERPL-SCNC: 137 MMOL/L (ref 136–145)
WBC # BLD AUTO: 7.3 X10(3) UL (ref 4–11)

## 2022-10-14 PROCEDURE — 85025 COMPLETE CBC W/AUTO DIFF WBC: CPT

## 2022-10-14 PROCEDURE — 82784 ASSAY IGA/IGD/IGG/IGM EACH: CPT

## 2022-10-14 PROCEDURE — 83615 LACTATE (LD) (LDH) ENZYME: CPT

## 2022-10-14 PROCEDURE — 99215 OFFICE O/P EST HI 40 MIN: CPT | Performed by: INTERNAL MEDICINE

## 2022-10-14 PROCEDURE — 36415 COLL VENOUS BLD VENIPUNCTURE: CPT

## 2022-10-14 PROCEDURE — 96365 THER/PROPH/DIAG IV INF INIT: CPT

## 2022-10-14 PROCEDURE — 96366 THER/PROPH/DIAG IV INF ADDON: CPT

## 2022-10-14 PROCEDURE — 80053 COMPREHEN METABOLIC PANEL: CPT

## 2022-10-14 RX ORDER — ATORVASTATIN CALCIUM 40 MG/1
TABLET, FILM COATED ORAL
Qty: 90 TABLET | Refills: 3 | Status: SHIPPED | OUTPATIENT
Start: 2022-10-14 | End: 2023-09-26

## 2022-10-14 RX ORDER — ACETAMINOPHEN 325 MG/1
650 TABLET ORAL ONCE
Status: COMPLETED | OUTPATIENT
Start: 2022-10-14 | End: 2022-10-14

## 2022-10-14 RX ORDER — DIPHENHYDRAMINE HCL 25 MG
25 CAPSULE ORAL ONCE
Status: CANCELLED
Start: 2023-05-01 | End: 2023-05-01

## 2022-10-14 RX ORDER — GLIMEPIRIDE 4 MG/1
TABLET ORAL
Qty: 180 TABLET | Refills: 1 | Status: SHIPPED | OUTPATIENT
Start: 2022-10-14

## 2022-10-14 RX ORDER — ACETAMINOPHEN 325 MG/1
650 TABLET ORAL ONCE
Status: CANCELLED
Start: 2023-05-01 | End: 2023-05-01

## 2022-10-14 RX ORDER — DIPHENHYDRAMINE HCL 25 MG
25 CAPSULE ORAL ONCE
Status: COMPLETED | OUTPATIENT
Start: 2022-10-14 | End: 2022-10-14

## 2022-10-14 RX ADMIN — DIPHENHYDRAMINE HCL 25 MG: 25 MG CAPSULE ORAL at 09:14:00

## 2022-10-14 RX ADMIN — ACETAMINOPHEN 650 MG: 325 TABLET ORAL at 09:13:00

## 2022-10-14 NOTE — PROGRESS NOTES
Education Record    Learner:  Patient    Disease / Diagnosis:CLL    Barriers / Limitations:  None   Comments:    Method:  Discussion   Comments:    General Topics:  Plan of care reviewed   Comments:    Outcome:  Shows understanding   Comments:    Patient here for IVIG

## 2022-10-21 RX ORDER — DIPHENHYDRAMINE HCL 25 MG
25 CAPSULE ORAL ONCE
OUTPATIENT
Start: 2023-05-01

## 2022-10-21 RX ORDER — ACETAMINOPHEN 325 MG/1
650 TABLET ORAL ONCE
OUTPATIENT
Start: 2023-05-01

## 2022-12-13 ENCOUNTER — TELEPHONE (OUTPATIENT)
Dept: HEMATOLOGY/ONCOLOGY | Facility: HOSPITAL | Age: 57
End: 2022-12-13

## 2022-12-13 NOTE — TELEPHONE ENCOUNTER
Ingrid Blood would like a call from [facility]) would like to talk to you regarding his appointment scheduling.  Thanks Kelsey Calhoun

## 2022-12-26 DIAGNOSIS — E11.9 TYPE 2 DIABETES MELLITUS WITHOUT COMPLICATION, WITHOUT LONG-TERM CURRENT USE OF INSULIN (HCC): ICD-10-CM

## 2022-12-26 RX ORDER — DAPAGLIFLOZIN 10 MG/1
TABLET, FILM COATED ORAL
Qty: 90 TABLET | Refills: 3 | Status: SHIPPED | OUTPATIENT
Start: 2022-12-26

## 2023-01-06 ENCOUNTER — APPOINTMENT (OUTPATIENT)
Dept: HEMATOLOGY/ONCOLOGY | Facility: HOSPITAL | Age: 58
End: 2023-01-06
Attending: INTERNAL MEDICINE
Payer: COMMERCIAL

## 2023-01-06 VITALS
HEART RATE: 104 BPM | WEIGHT: 233.31 LBS | RESPIRATION RATE: 18 BRPM | SYSTOLIC BLOOD PRESSURE: 133 MMHG | DIASTOLIC BLOOD PRESSURE: 77 MMHG | TEMPERATURE: 97 F | HEIGHT: 74.02 IN | OXYGEN SATURATION: 97 % | BODY MASS INDEX: 29.94 KG/M2

## 2023-01-06 DIAGNOSIS — M25.511 ACUTE PAIN OF RIGHT SHOULDER DUE TO TRAUMA: Primary | ICD-10-CM

## 2023-01-06 DIAGNOSIS — G89.11 ACUTE PAIN OF RIGHT SHOULDER DUE TO TRAUMA: Primary | ICD-10-CM

## 2023-01-06 DIAGNOSIS — T88.7XXA MEDICATION SIDE EFFECTS: ICD-10-CM

## 2023-01-06 DIAGNOSIS — D69.6 THROMBOCYTOPENIA (HCC): ICD-10-CM

## 2023-01-06 DIAGNOSIS — R74.01 TRANSAMINITIS: ICD-10-CM

## 2023-01-06 DIAGNOSIS — C91.10 CLL (CHRONIC LYMPHOCYTIC LEUKEMIA) (HCC): ICD-10-CM

## 2023-01-06 DIAGNOSIS — D80.1 HYPOGAMMAGLOBULINEMIA (HCC): ICD-10-CM

## 2023-01-06 LAB
ALBUMIN SERPL-MCNC: 3.8 G/DL (ref 3.4–5)
ALBUMIN/GLOB SERPL: 1.2 {RATIO} (ref 1–2)
ALP LIVER SERPL-CCNC: 62 U/L
ALT SERPL-CCNC: 32 U/L
ANION GAP SERPL CALC-SCNC: 7 MMOL/L (ref 0–18)
AST SERPL-CCNC: 19 U/L (ref 15–37)
BASOPHILS # BLD AUTO: 0.06 X10(3) UL (ref 0–0.2)
BASOPHILS NFR BLD AUTO: 0.5 %
BILIRUB SERPL-MCNC: 0.6 MG/DL (ref 0.1–2)
BUN BLD-MCNC: 14 MG/DL (ref 7–18)
CALCIUM BLD-MCNC: 9.6 MG/DL (ref 8.5–10.1)
CHLORIDE SERPL-SCNC: 101 MMOL/L (ref 98–112)
CO2 SERPL-SCNC: 27 MMOL/L (ref 21–32)
CREAT BLD-MCNC: 0.95 MG/DL
EOSINOPHIL # BLD AUTO: 0.1 X10(3) UL (ref 0–0.7)
EOSINOPHIL NFR BLD AUTO: 0.9 %
ERYTHROCYTE [DISTWIDTH] IN BLOOD BY AUTOMATED COUNT: 12.1 %
GFR SERPLBLD BASED ON 1.73 SQ M-ARVRAT: 93 ML/MIN/1.73M2 (ref 60–?)
GLOBULIN PLAS-MCNC: 3.1 G/DL (ref 2.8–4.4)
GLUCOSE BLD-MCNC: 253 MG/DL (ref 70–99)
HCT VFR BLD AUTO: 47.9 %
HGB BLD-MCNC: 15.9 G/DL
IMM GRANULOCYTES # BLD AUTO: 0.04 X10(3) UL (ref 0–1)
IMM GRANULOCYTES NFR BLD: 0.3 %
IMMUNOGLOBULIN PNL SER-MCNC: 274 MG/DL (ref 791–1643)
LDH SERPL L TO P-CCNC: 139 U/L
LYMPHOCYTES # BLD AUTO: 1.32 X10(3) UL (ref 1–4)
LYMPHOCYTES NFR BLD AUTO: 11.3 %
MCH RBC QN AUTO: 29.9 PG (ref 26–34)
MCHC RBC AUTO-ENTMCNC: 33.2 G/DL (ref 31–37)
MCV RBC AUTO: 90.2 FL
MONOCYTES # BLD AUTO: 1.02 X10(3) UL (ref 0.1–1)
MONOCYTES NFR BLD AUTO: 8.7 %
NEUTROPHILS # BLD AUTO: 9.16 X10 (3) UL (ref 1.5–7.7)
NEUTROPHILS # BLD AUTO: 9.16 X10(3) UL (ref 1.5–7.7)
NEUTROPHILS NFR BLD AUTO: 78.3 %
OSMOLALITY SERPL CALC.SUM OF ELEC: 289 MOSM/KG (ref 275–295)
PLATELET # BLD AUTO: 142 10(3)UL (ref 150–450)
POTASSIUM SERPL-SCNC: 4.2 MMOL/L (ref 3.5–5.1)
PROT SERPL-MCNC: 6.9 G/DL (ref 6.4–8.2)
RBC # BLD AUTO: 5.31 X10(6)UL
SODIUM SERPL-SCNC: 135 MMOL/L (ref 136–145)
WBC # BLD AUTO: 11.7 X10(3) UL (ref 4–11)

## 2023-01-06 PROCEDURE — 99215 OFFICE O/P EST HI 40 MIN: CPT | Performed by: INTERNAL MEDICINE

## 2023-01-06 RX ORDER — OSELTAMIVIR PHOSPHATE 75 MG/1
75 CAPSULE ORAL DAILY
Qty: 10 CAPSULE | Refills: 0 | Status: SHIPPED | OUTPATIENT
Start: 2023-01-06 | End: 2023-01-16

## 2023-01-06 RX ORDER — TRAMADOL HYDROCHLORIDE 50 MG/1
50 TABLET ORAL EVERY 8 HOURS PRN
Qty: 30 TABLET | Refills: 0 | Status: SHIPPED | OUTPATIENT
Start: 2023-01-06

## 2023-01-10 ENCOUNTER — HOSPITAL ENCOUNTER (OUTPATIENT)
Dept: MRI IMAGING | Facility: HOSPITAL | Age: 58
Discharge: HOME | End: 2023-01-10
Attending: INTERNAL MEDICINE
Payer: COMMERCIAL

## 2023-01-10 ENCOUNTER — PATIENT MESSAGE (OUTPATIENT)
Dept: INTERNAL MEDICINE CLINIC | Facility: CLINIC | Age: 58
End: 2023-01-10

## 2023-01-10 ENCOUNTER — TELEPHONE (OUTPATIENT)
Dept: ORTHOPEDICS CLINIC | Facility: CLINIC | Age: 58
End: 2023-01-10

## 2023-01-10 ENCOUNTER — TELEPHONE (OUTPATIENT)
Dept: INTERNAL MEDICINE CLINIC | Facility: CLINIC | Age: 58
End: 2023-01-10

## 2023-01-10 DIAGNOSIS — G89.11 ACUTE PAIN OF RIGHT SHOULDER DUE TO TRAUMA: ICD-10-CM

## 2023-01-10 DIAGNOSIS — M25.511 ACUTE PAIN OF RIGHT SHOULDER DUE TO TRAUMA: ICD-10-CM

## 2023-01-10 DIAGNOSIS — R93.6 ABNORMAL MRI, SHOULDER: Primary | ICD-10-CM

## 2023-01-10 PROCEDURE — 73221 MRI JOINT UPR EXTREM W/O DYE: CPT | Performed by: INTERNAL MEDICINE

## 2023-01-10 NOTE — TELEPHONE ENCOUNTER
Pt recently tore his shoulder and had an MRI. He needs a referral to an orthopedic doctor. Once the referral is placed the patient can be informed vis Tima.

## 2023-01-10 NOTE — TELEPHONE ENCOUNTER
Rodolfo Joel MD  [facility]  [facility], [address], [address]    Referral placed.

## 2023-01-11 DIAGNOSIS — M25.511 RIGHT SHOULDER PAIN, UNSPECIFIED CHRONICITY: Primary | ICD-10-CM

## 2023-01-12 ENCOUNTER — TELEPHONE (OUTPATIENT)
Dept: INTERNAL MEDICINE CLINIC | Facility: CLINIC | Age: 58
End: 2023-01-12

## 2023-01-12 ENCOUNTER — PATIENT MESSAGE (OUTPATIENT)
Dept: INTERNAL MEDICINE CLINIC | Facility: CLINIC | Age: 58
End: 2023-01-12

## 2023-01-12 ENCOUNTER — TELEPHONE (OUTPATIENT)
Dept: ORTHOPEDICS CLINIC | Facility: CLINIC | Age: 58
End: 2023-01-12

## 2023-01-12 ENCOUNTER — HOSPITAL ENCOUNTER (OUTPATIENT)
Dept: GENERAL RADIOLOGY | Age: 58
Discharge: HOME | End: 2023-01-12
Attending: ORTHOPAEDIC SURGERY
Payer: COMMERCIAL

## 2023-01-12 ENCOUNTER — TELEPHONE (OUTPATIENT)
Dept: HEMATOLOGY/ONCOLOGY | Facility: HOSPITAL | Age: 58
End: 2023-01-12

## 2023-01-12 ENCOUNTER — OFFICE VISIT (OUTPATIENT)
Dept: ORTHOPEDICS CLINIC | Facility: CLINIC | Age: 58
End: 2023-01-12
Payer: COMMERCIAL

## 2023-01-12 DIAGNOSIS — S43.439A DEGENERATIVE SUPERIOR LABRAL ANTERIOR-TO-POSTERIOR (SLAP) TEAR OF SHOULDER: ICD-10-CM

## 2023-01-12 DIAGNOSIS — C91.10 CLL (CHRONIC LYMPHOCYTIC LEUKEMIA) (HCC): ICD-10-CM

## 2023-01-12 DIAGNOSIS — M54.40 LOW BACK PAIN WITH SCIATICA, SCIATICA LATERALITY UNSPECIFIED, UNSPECIFIED BACK PAIN LATERALITY, UNSPECIFIED CHRONICITY: Primary | ICD-10-CM

## 2023-01-12 DIAGNOSIS — M54.40 LOW BACK PAIN WITH SCIATICA, SCIATICA LATERALITY UNSPECIFIED, UNSPECIFIED BACK PAIN LATERALITY, UNSPECIFIED CHRONICITY: ICD-10-CM

## 2023-01-12 DIAGNOSIS — M25.511 RIGHT SHOULDER PAIN, UNSPECIFIED CHRONICITY: ICD-10-CM

## 2023-01-12 DIAGNOSIS — S46.911A SHOULDER STRAIN, RIGHT, INITIAL ENCOUNTER: ICD-10-CM

## 2023-01-12 PROCEDURE — 72100 X-RAY EXAM L-S SPINE 2/3 VWS: CPT | Performed by: ORTHOPAEDIC SURGERY

## 2023-01-12 PROCEDURE — 99244 OFF/OP CNSLTJ NEW/EST MOD 40: CPT | Performed by: ORTHOPAEDIC SURGERY

## 2023-01-12 PROCEDURE — 73030 X-RAY EXAM OF SHOULDER: CPT | Performed by: ORTHOPAEDIC SURGERY

## 2023-01-12 RX ORDER — ONDANSETRON HYDROCHLORIDE 8 MG/1
8 TABLET, FILM COATED ORAL EVERY 8 HOURS PRN
Qty: 30 TABLET | Refills: 3 | Status: SHIPPED | OUTPATIENT
Start: 2023-01-12

## 2023-01-12 RX ORDER — FLUCONAZOLE 100 MG/1
100 TABLET ORAL DAILY
Qty: 30 TABLET | Refills: 5 | Status: SHIPPED | OUTPATIENT
Start: 2023-01-12

## 2023-01-12 RX ORDER — METHYLPREDNISOLONE 4 MG/1
TABLET ORAL
Qty: 1 EACH | Refills: 0 | Status: SHIPPED | OUTPATIENT
Start: 2023-01-12

## 2023-01-12 NOTE — TELEPHONE ENCOUNTER
Disp Refills Start End    methylPREDNISolone 4 MG Oral Tablet Therapy Pack 1 each 0 1/12/2023     Sig: Take as directed    Sent to pharmacy as: methylPREDNISolone 4 MG Oral Tablet Therapy Pack (Medrol Dosepak)    E-Prescribing Status: Receipt confirmed by pharmacy (1/12/2023 12:52 PM CST)    Pharmacy  [address] [place] [address] [phone], [phone]       Spoke to patient and informed that rx was sent to the pharmacy. Confirmed with pharmacists who states that rx was received and is being processed.  No further questions or concerns

## 2023-01-12 NOTE — TELEPHONE ENCOUNTER
Dr. Eloy Mariano is recommending medrol dose pack and needs approval from Dr. Karolyn Atwood      Per Dr. Karolyn Atwood ok to dispense-[facility]?

## 2023-01-12 NOTE — TELEPHONE ENCOUNTER
Patient is calling because he sees doctor Paco Ng (orto) needs approval for madrol dose pac 4mg.  Doctor Chantal Haro if approved please send approval through [employer]. (per patient)

## 2023-01-12 NOTE — TELEPHONE ENCOUNTER
From: Gerry Castaneda  Sent: 1/12/2023 1:32 PM CST  To: [facility] Clinical Staff  Subject: Medrol dose odalis request    All I needed was Trans approval for me to take it

## 2023-01-12 NOTE — TELEPHONE ENCOUNTER
We have not seen patient for this diagnosis. Patient will need to have appropriate medications prescribed by specialist. Lisbet Coh[employer] message sent to patient.

## 2023-01-16 ENCOUNTER — PATIENT MESSAGE (OUTPATIENT)
Dept: ORTHOPEDICS CLINIC | Facility: CLINIC | Age: 58
End: 2023-01-16

## 2023-01-16 DIAGNOSIS — M54.40 LOW BACK PAIN WITH SCIATICA, SCIATICA LATERALITY UNSPECIFIED, UNSPECIFIED BACK PAIN LATERALITY, UNSPECIFIED CHRONICITY: Primary | ICD-10-CM

## 2023-01-17 NOTE — TELEPHONE ENCOUNTER
Lisa TILLEY PA-C  [facility] Clinical Pool 12 minutes ago (9:54 AM)     Letter signed, he may benefit from a few sessions of PT for home exercises thank you

## 2023-01-17 NOTE — TELEPHONE ENCOUNTER
From: Jayden Lopes  To: Orie Cockayne, MD  Sent: 1/16/2023 9:33 AM CST  Subject: return to work date    My back has improved significantly since I started on the medrol odalis, I have some stiffness, but the pain levels have dropped immensely, and I can now walk without trouble. The question I most wanted to ask though is can I return to work on 1-30, instead of 2-2? That way I would be able to get a full week of work in as we can use the money. Are there any good stretches I can do to loosen up my lower back, or is it something I can look up online and follow those?   Thanks Jair Kirkland

## 2023-01-17 NOTE — TELEPHONE ENCOUNTER
LOV 1/12/23    Pt requesting updated letter allowing him tgo return work Monday 1/30/23 instead of Thursday 2/2/23. Pended. Can send via [employer]. Pt asking regarding stretched for lower back. Pt had been recommended to complete MRI and follow up with Dr Nitish Sahu after. MRI is not yet scheduled. Should he have this done prior to any recommendations or stretches? Thank you!

## 2023-01-18 DIAGNOSIS — E11.9 TYPE 2 DIABETES MELLITUS WITHOUT COMPLICATION, WITHOUT LONG-TERM CURRENT USE OF INSULIN (HCC): ICD-10-CM

## 2023-01-18 RX ORDER — GLIMEPIRIDE 4 MG/1
4 TABLET ORAL 2 TIMES DAILY WITH MEALS
Qty: 180 TABLET | Refills: 5 | Status: SHIPPED | OUTPATIENT
Start: 2023-01-18

## 2023-01-20 DIAGNOSIS — C91.10 CLL (CHRONIC LYMPHOCYTIC LEUKEMIA) (HCC): ICD-10-CM

## 2023-01-20 RX ORDER — IBRUTINIB 420 MG/1
1 TABLET, FILM COATED ORAL EVERY MORNING
Qty: 30 TABLET | Refills: 5 | Status: SHIPPED | OUTPATIENT
Start: 2023-01-20

## 2023-01-28 ENCOUNTER — PATIENT MESSAGE (OUTPATIENT)
Dept: ORTHOPEDICS CLINIC | Facility: CLINIC | Age: 58
End: 2023-01-28

## 2023-01-29 PROBLEM — Z78.9 HEPATITIS C ANTIBODY TEST NEGATIVE: Status: ACTIVE | Noted: 2023-01-29

## 2023-01-30 NOTE — TELEPHONE ENCOUNTER
From: Paolo Chung  To: Abi Dorado MD  Sent: 1/28/2023 10:07 AM CST  Subject: returning to work on 1-30     arm is significantly better now, but . should i have a weight limit for lifting anything with my right arm as it is still recovering form the slap tear?

## 2023-01-31 DIAGNOSIS — C91.10 CLL (CHRONIC LYMPHOCYTIC LEUKEMIA) (HCC): ICD-10-CM

## 2023-01-31 RX ORDER — IBRUTINIB 420 MG/1
1 TABLET, FILM COATED ORAL EVERY MORNING
Qty: 30 TABLET | Refills: 5 | Status: SHIPPED | OUTPATIENT
Start: 2023-01-31

## 2023-02-09 ENCOUNTER — OFFICE VISIT (OUTPATIENT)
Dept: INTERNAL MEDICINE CLINIC | Facility: CLINIC | Age: 58
End: 2023-02-09
Payer: COMMERCIAL

## 2023-02-09 VITALS
OXYGEN SATURATION: 100 % | HEIGHT: 74 IN | SYSTOLIC BLOOD PRESSURE: 130 MMHG | HEART RATE: 91 BPM | DIASTOLIC BLOOD PRESSURE: 72 MMHG | RESPIRATION RATE: 16 BRPM | BODY MASS INDEX: 29.52 KG/M2 | WEIGHT: 230 LBS

## 2023-02-09 DIAGNOSIS — Z00.00 ANNUAL PHYSICAL EXAM: Primary | ICD-10-CM

## 2023-02-09 DIAGNOSIS — Z12.11 COLON CANCER SCREENING: ICD-10-CM

## 2023-02-09 PROCEDURE — 3075F SYST BP GE 130 - 139MM HG: CPT | Performed by: INTERNAL MEDICINE

## 2023-02-09 PROCEDURE — 99396 PREV VISIT EST AGE 40-64: CPT | Performed by: INTERNAL MEDICINE

## 2023-02-09 PROCEDURE — 3078F DIAST BP <80 MM HG: CPT | Performed by: INTERNAL MEDICINE

## 2023-02-09 PROCEDURE — 3008F BODY MASS INDEX DOCD: CPT | Performed by: INTERNAL MEDICINE

## 2023-03-25 DIAGNOSIS — E11.9 TYPE 2 DIABETES MELLITUS WITHOUT COMPLICATION, WITHOUT LONG-TERM CURRENT USE OF INSULIN (HCC): ICD-10-CM

## 2023-03-26 RX ORDER — GLIMEPIRIDE 4 MG/1
TABLET ORAL
Qty: 180 TABLET | Refills: 1 | Status: SHIPPED | OUTPATIENT
Start: 2023-03-26

## 2023-04-06 ENCOUNTER — APPOINTMENT (OUTPATIENT)
Dept: HEMATOLOGY/ONCOLOGY | Facility: HOSPITAL | Age: 58
End: 2023-04-06
Attending: INTERNAL MEDICINE
Payer: COMMERCIAL

## 2023-04-07 ENCOUNTER — OFFICE VISIT (OUTPATIENT)
Dept: HEMATOLOGY/ONCOLOGY | Facility: HOSPITAL | Age: 58
End: 2023-04-07
Attending: INTERNAL MEDICINE
Payer: COMMERCIAL

## 2023-04-07 VITALS
HEART RATE: 110 BPM | WEIGHT: 223.5 LBS | TEMPERATURE: 98 F | SYSTOLIC BLOOD PRESSURE: 139 MMHG | OXYGEN SATURATION: 98 % | RESPIRATION RATE: 20 BRPM | DIASTOLIC BLOOD PRESSURE: 93 MMHG | HEIGHT: 74.02 IN | BODY MASS INDEX: 28.68 KG/M2

## 2023-04-07 DIAGNOSIS — G89.11 ACUTE PAIN OF RIGHT SHOULDER DUE TO TRAUMA: ICD-10-CM

## 2023-04-07 DIAGNOSIS — H20.9 IRITIS: ICD-10-CM

## 2023-04-07 DIAGNOSIS — D69.6 THROMBOCYTOPENIA (HCC): ICD-10-CM

## 2023-04-07 DIAGNOSIS — C91.10 CLL (CHRONIC LYMPHOCYTIC LEUKEMIA) (HCC): ICD-10-CM

## 2023-04-07 DIAGNOSIS — M25.511 ACUTE PAIN OF RIGHT SHOULDER DUE TO TRAUMA: ICD-10-CM

## 2023-04-07 DIAGNOSIS — R63.4 WEIGHT LOSS: ICD-10-CM

## 2023-04-07 DIAGNOSIS — H15.001 SCLERITIS OF RIGHT EYE: Primary | ICD-10-CM

## 2023-04-07 DIAGNOSIS — R74.01 TRANSAMINITIS: ICD-10-CM

## 2023-04-07 DIAGNOSIS — D80.1 HYPOGAMMAGLOBULINEMIA (HCC): ICD-10-CM

## 2023-04-07 DIAGNOSIS — C91.10 CLL (CHRONIC LYMPHOCYTIC LEUKEMIA) (HCC): Primary | ICD-10-CM

## 2023-04-07 LAB
ALBUMIN SERPL-MCNC: 3.8 G/DL (ref 3.4–5)
ALBUMIN/GLOB SERPL: 1.4 {RATIO} (ref 1–2)
ALP LIVER SERPL-CCNC: 67 U/L
ALT SERPL-CCNC: 55 U/L
ANION GAP SERPL CALC-SCNC: 9 MMOL/L (ref 0–18)
AST SERPL-CCNC: 23 U/L (ref 15–37)
BASOPHILS # BLD AUTO: 0.05 X10(3) UL (ref 0–0.2)
BASOPHILS NFR BLD AUTO: 0.4 %
BILIRUB SERPL-MCNC: 0.6 MG/DL (ref 0.1–2)
BUN BLD-MCNC: 20 MG/DL (ref 7–18)
CALCIUM BLD-MCNC: 9.2 MG/DL (ref 8.5–10.1)
CHLORIDE SERPL-SCNC: 101 MMOL/L (ref 98–112)
CO2 SERPL-SCNC: 22 MMOL/L (ref 21–32)
CREAT BLD-MCNC: 1 MG/DL
EOSINOPHIL # BLD AUTO: 0.01 X10(3) UL (ref 0–0.7)
EOSINOPHIL NFR BLD AUTO: 0.1 %
ERYTHROCYTE [DISTWIDTH] IN BLOOD BY AUTOMATED COUNT: 13.8 %
GFR SERPLBLD BASED ON 1.73 SQ M-ARVRAT: 88 ML/MIN/1.73M2 (ref 60–?)
GLOBULIN PLAS-MCNC: 2.8 G/DL (ref 2.8–4.4)
GLUCOSE BLD-MCNC: 212 MG/DL (ref 70–99)
HCT VFR BLD AUTO: 51.5 %
HGB BLD-MCNC: 17.4 G/DL
IMM GRANULOCYTES # BLD AUTO: 0.12 X10(3) UL (ref 0–1)
IMM GRANULOCYTES NFR BLD: 0.8 %
IMMUNOGLOBULIN PNL SER-MCNC: 227 MG/DL (ref 791–1643)
LDH SERPL L TO P-CCNC: 214 U/L
LYMPHOCYTES # BLD AUTO: 2.11 X10(3) UL (ref 1–4)
LYMPHOCYTES NFR BLD AUTO: 14.8 %
MCH RBC QN AUTO: 30.7 PG (ref 26–34)
MCHC RBC AUTO-ENTMCNC: 33.8 G/DL (ref 31–37)
MCV RBC AUTO: 91 FL
MONOCYTES # BLD AUTO: 0.72 X10(3) UL (ref 0.1–1)
MONOCYTES NFR BLD AUTO: 5.1 %
NEUTROPHILS # BLD AUTO: 11.23 X10 (3) UL (ref 1.5–7.7)
NEUTROPHILS # BLD AUTO: 11.23 X10(3) UL (ref 1.5–7.7)
NEUTROPHILS NFR BLD AUTO: 78.8 %
OSMOLALITY SERPL CALC.SUM OF ELEC: 283 MOSM/KG (ref 275–295)
PLATELET # BLD AUTO: 183 10(3)UL (ref 150–450)
POTASSIUM SERPL-SCNC: 4.7 MMOL/L (ref 3.5–5.1)
PROT SERPL-MCNC: 6.6 G/DL (ref 6.4–8.2)
RBC # BLD AUTO: 5.66 X10(6)UL
RHEUMATOID FACT SERPL-ACNC: <10 IU/ML (ref ?–15)
SODIUM SERPL-SCNC: 132 MMOL/L (ref 136–145)
WBC # BLD AUTO: 14.2 X10(3) UL (ref 4–11)

## 2023-04-07 PROCEDURE — 99215 OFFICE O/P EST HI 40 MIN: CPT | Performed by: INTERNAL MEDICINE

## 2023-04-07 PROCEDURE — 96366 THER/PROPH/DIAG IV INF ADDON: CPT

## 2023-04-07 PROCEDURE — 96365 THER/PROPH/DIAG IV INF INIT: CPT

## 2023-04-07 RX ORDER — ACETAMINOPHEN 325 MG/1
650 TABLET ORAL ONCE
OUTPATIENT
Start: 2023-04-07

## 2023-04-07 RX ORDER — ACETAMINOPHEN 325 MG/1
650 TABLET ORAL ONCE
Status: COMPLETED | OUTPATIENT
Start: 2023-04-07 | End: 2023-04-07

## 2023-04-07 RX ORDER — DIPHENHYDRAMINE HCL 25 MG
25 CAPSULE ORAL ONCE
OUTPATIENT
Start: 2023-04-07

## 2023-04-07 RX ORDER — PREDNISONE 20 MG/1
TABLET ORAL
COMMUNITY
Start: 2023-03-15

## 2023-04-07 RX ORDER — DIPHENHYDRAMINE HCL 25 MG
25 CAPSULE ORAL ONCE
Status: COMPLETED | OUTPATIENT
Start: 2023-04-07 | End: 2023-04-07

## 2023-04-07 RX ADMIN — DIPHENHYDRAMINE HCL 25 MG: 25 MG CAPSULE ORAL at 13:48:00

## 2023-04-07 RX ADMIN — ACETAMINOPHEN 650 MG: 325 TABLET ORAL at 13:48:00

## 2023-04-07 NOTE — PROGRESS NOTES
Education Record    Learner:  Patient    Disease / Diagnosis:CLL    Barriers / Limitations:  None   Comments:    Method:  Discussion   Comments:    General Topics:  Plan of care reviewed   Comments:    Outcome:  Shows understanding   Comments:  Pt being treated for posterior scleritis, on prednisone 60 mg, with wean. Not sleeping and huge appetite. Notes weight loss since last visit.

## 2023-04-10 LAB
ACE: 40 U/L
LYSOZYME: 4.3 UG/ML

## 2023-04-12 LAB
ANKYLOSING SPONDYLITIS (HLAB27): NEGATIVE
DSDNA IGG SERPL IA-ACNC: <0.6 IU/ML
ENA AB SER QL IA: <0.09 UG/L
ENA AB SER QL IA: NEGATIVE

## 2023-05-13 DIAGNOSIS — I10 ESSENTIAL HYPERTENSION: ICD-10-CM

## 2023-05-15 RX ORDER — BENAZEPRIL HYDROCHLORIDE 10 MG/1
TABLET ORAL
Qty: 90 TABLET | Refills: 0 | Status: SHIPPED | OUTPATIENT
Start: 2023-05-15

## 2023-05-15 NOTE — TELEPHONE ENCOUNTER
Last time medication was refilled 06/07/2022  Quantity and # of refills 90 w/ 3 refills  Last OV 02/09/2023  Next OV 08/10/2023    Refill Sent.

## 2023-06-10 DIAGNOSIS — E11.9 TYPE 2 DIABETES MELLITUS WITHOUT COMPLICATION, WITHOUT LONG-TERM CURRENT USE OF INSULIN (HCC): ICD-10-CM

## 2023-06-30 DIAGNOSIS — C91.10 CLL (CHRONIC LYMPHOCYTIC LEUKEMIA) (HCC): ICD-10-CM

## 2023-06-30 RX ORDER — IBRUTINIB 420 MG/1
1 TABLET, FILM COATED ORAL EVERY MORNING
Qty: 28 TABLET | Refills: 5 | Status: SHIPPED | OUTPATIENT
Start: 2023-06-30

## 2023-07-07 ENCOUNTER — APPOINTMENT (OUTPATIENT)
Dept: HEMATOLOGY/ONCOLOGY | Facility: HOSPITAL | Age: 58
End: 2023-07-07
Attending: INTERNAL MEDICINE
Payer: COMMERCIAL

## 2023-07-13 ENCOUNTER — OFFICE VISIT (OUTPATIENT)
Dept: HEMATOLOGY/ONCOLOGY | Facility: HOSPITAL | Age: 58
End: 2023-07-13
Attending: INTERNAL MEDICINE
Payer: COMMERCIAL

## 2023-07-13 VITALS
WEIGHT: 226 LBS | RESPIRATION RATE: 18 BRPM | OXYGEN SATURATION: 95 % | SYSTOLIC BLOOD PRESSURE: 137 MMHG | BODY MASS INDEX: 29 KG/M2 | TEMPERATURE: 97 F | HEIGHT: 74.02 IN | DIASTOLIC BLOOD PRESSURE: 76 MMHG | HEART RATE: 94 BPM

## 2023-07-13 DIAGNOSIS — D80.1 HYPOGAMMAGLOBULINEMIA (HCC): ICD-10-CM

## 2023-07-13 DIAGNOSIS — H15.001 SCLERITIS OF RIGHT EYE: Primary | ICD-10-CM

## 2023-07-13 DIAGNOSIS — R63.4 WEIGHT LOSS: ICD-10-CM

## 2023-07-13 DIAGNOSIS — R74.01 TRANSAMINITIS: ICD-10-CM

## 2023-07-13 DIAGNOSIS — T88.7XXA MEDICATION SIDE EFFECTS: ICD-10-CM

## 2023-07-13 DIAGNOSIS — C91.10 CLL (CHRONIC LYMPHOCYTIC LEUKEMIA) (HCC): ICD-10-CM

## 2023-07-13 DIAGNOSIS — H20.9 IRITIS: ICD-10-CM

## 2023-07-13 LAB
ALBUMIN SERPL-MCNC: 3.8 G/DL (ref 3.4–5)
ALBUMIN/GLOB SERPL: 1.4 {RATIO} (ref 1–2)
ALP LIVER SERPL-CCNC: 84 U/L
ALT SERPL-CCNC: 41 U/L
ANION GAP SERPL CALC-SCNC: 8 MMOL/L (ref 0–18)
AST SERPL-CCNC: 28 U/L (ref 15–37)
BASOPHILS # BLD AUTO: 0.05 X10(3) UL (ref 0–0.2)
BASOPHILS NFR BLD AUTO: 0.7 %
BILIRUB SERPL-MCNC: 0.3 MG/DL (ref 0.1–2)
BUN BLD-MCNC: 13 MG/DL (ref 7–18)
CALCIUM BLD-MCNC: 9.2 MG/DL (ref 8.5–10.1)
CHLORIDE SERPL-SCNC: 107 MMOL/L (ref 98–112)
CO2 SERPL-SCNC: 24 MMOL/L (ref 21–32)
CREAT BLD-MCNC: 0.91 MG/DL
EOSINOPHIL # BLD AUTO: 0.09 X10(3) UL (ref 0–0.7)
EOSINOPHIL NFR BLD AUTO: 1.2 %
ERYTHROCYTE [DISTWIDTH] IN BLOOD BY AUTOMATED COUNT: 11.2 %
GFR SERPLBLD BASED ON 1.73 SQ M-ARVRAT: 98 ML/MIN/1.73M2 (ref 60–?)
GLOBULIN PLAS-MCNC: 2.8 G/DL (ref 2.8–4.4)
GLUCOSE BLD-MCNC: 199 MG/DL (ref 70–99)
HCT VFR BLD AUTO: 45.2 %
HGB BLD-MCNC: 15.4 G/DL
IMM GRANULOCYTES # BLD AUTO: 0.03 X10(3) UL (ref 0–1)
IMM GRANULOCYTES NFR BLD: 0.4 %
IMMUNOGLOBULIN PNL SER-MCNC: 282 MG/DL (ref 791–1643)
LDH SERPL L TO P-CCNC: 173 U/L
LYMPHOCYTES # BLD AUTO: 1.2 X10(3) UL (ref 1–4)
LYMPHOCYTES NFR BLD AUTO: 16.6 %
MCH RBC QN AUTO: 31.3 PG (ref 26–34)
MCHC RBC AUTO-ENTMCNC: 34.1 G/DL (ref 31–37)
MCV RBC AUTO: 91.9 FL
MONOCYTES # BLD AUTO: 0.49 X10(3) UL (ref 0.1–1)
MONOCYTES NFR BLD AUTO: 6.8 %
NEUTROPHILS # BLD AUTO: 5.38 X10 (3) UL (ref 1.5–7.7)
NEUTROPHILS # BLD AUTO: 5.38 X10(3) UL (ref 1.5–7.7)
NEUTROPHILS NFR BLD AUTO: 74.3 %
OSMOLALITY SERPL CALC.SUM OF ELEC: 294 MOSM/KG (ref 275–295)
PLATELET # BLD AUTO: 170 10(3)UL (ref 150–450)
POTASSIUM SERPL-SCNC: 4.3 MMOL/L (ref 3.5–5.1)
PROT SERPL-MCNC: 6.6 G/DL (ref 6.4–8.2)
RBC # BLD AUTO: 4.92 X10(6)UL
SODIUM SERPL-SCNC: 139 MMOL/L (ref 136–145)
WBC # BLD AUTO: 7.2 X10(3) UL (ref 4–11)

## 2023-07-13 PROCEDURE — 99215 OFFICE O/P EST HI 40 MIN: CPT | Performed by: INTERNAL MEDICINE

## 2023-07-24 ENCOUNTER — OFFICE VISIT (OUTPATIENT)
Dept: OCCUPATIONAL MEDICINE | Age: 58
End: 2023-07-24
Attending: PHYSICIAN ASSISTANT

## 2023-07-25 ENCOUNTER — TELEPHONE (OUTPATIENT)
Dept: INTERNAL MEDICINE CLINIC | Facility: CLINIC | Age: 58
End: 2023-07-25

## 2023-07-25 DIAGNOSIS — E78.2 MIXED HYPERLIPIDEMIA: ICD-10-CM

## 2023-07-25 DIAGNOSIS — Z00.00 LABORATORY EXAMINATION ORDERED AS PART OF A ROUTINE GENERAL MEDICAL EXAMINATION: ICD-10-CM

## 2023-07-25 DIAGNOSIS — I10 ESSENTIAL HYPERTENSION: ICD-10-CM

## 2023-07-25 DIAGNOSIS — Z12.5 SCREENING FOR PROSTATE CANCER: ICD-10-CM

## 2023-07-25 DIAGNOSIS — E11.9 TYPE 2 DIABETES MELLITUS WITHOUT COMPLICATION, WITHOUT LONG-TERM CURRENT USE OF INSULIN (HCC): Primary | ICD-10-CM

## 2023-07-25 NOTE — TELEPHONE ENCOUNTER
[facility]   LOV: 2/9/23  Next OV & Reason:  8/10/23 6 mo follow up    Patient was notified to fast 8-10 hours drinking only water/black coffee prior to having labs drawn and may need to submit urine sample. Hemoglobin A1C will be ordered if patient has diabetes or prediabetes. Lab orders will be placed by end of day:  yes  Patient requesting A1C: as needed  Patient informed insurance may not cover A1C and they may be responsible for cost if denied by insurance:  Yes  Patient requesting return call:   No

## 2023-07-28 ENCOUNTER — LAB ENCOUNTER (OUTPATIENT)
Dept: LAB | Age: 58
End: 2023-07-28
Attending: INTERNAL MEDICINE
Payer: COMMERCIAL

## 2023-07-28 DIAGNOSIS — E11.9 TYPE 2 DIABETES MELLITUS WITHOUT COMPLICATION, WITHOUT LONG-TERM CURRENT USE OF INSULIN (HCC): ICD-10-CM

## 2023-07-28 DIAGNOSIS — E78.2 MIXED HYPERLIPIDEMIA: ICD-10-CM

## 2023-07-28 DIAGNOSIS — Z00.00 LABORATORY EXAMINATION ORDERED AS PART OF A ROUTINE GENERAL MEDICAL EXAMINATION: ICD-10-CM

## 2023-07-28 DIAGNOSIS — Z12.5 SCREENING FOR PROSTATE CANCER: ICD-10-CM

## 2023-07-28 LAB
ALBUMIN SERPL-MCNC: 4 G/DL (ref 3.4–5)
ALBUMIN/GLOB SERPL: 1.5 {RATIO} (ref 1–2)
ALP LIVER SERPL-CCNC: 61 U/L
ALT SERPL-CCNC: 39 U/L
ANION GAP SERPL CALC-SCNC: 5 MMOL/L (ref 0–18)
AST SERPL-CCNC: 23 U/L (ref 15–37)
BASOPHILS # BLD AUTO: 0.07 X10(3) UL (ref 0–0.2)
BASOPHILS NFR BLD AUTO: 1.1 %
BILIRUB SERPL-MCNC: 0.5 MG/DL (ref 0.1–2)
BILIRUB UR QL STRIP.AUTO: NEGATIVE
BUN BLD-MCNC: 17 MG/DL (ref 7–18)
CALCIUM BLD-MCNC: 9.5 MG/DL (ref 8.5–10.1)
CHLORIDE SERPL-SCNC: 103 MMOL/L (ref 98–112)
CHOLEST SERPL-MCNC: 122 MG/DL (ref ?–200)
CLARITY UR REFRACT.AUTO: CLEAR
CO2 SERPL-SCNC: 27 MMOL/L (ref 21–32)
COMPLEXED PSA SERPL-MCNC: 0.64 NG/ML (ref ?–4)
CREAT BLD-MCNC: 0.94 MG/DL
CREAT UR-SCNC: 34 MG/DL
EGFRCR SERPLBLD CKD-EPI 2021: 94 ML/MIN/1.73M2 (ref 60–?)
EOSINOPHIL # BLD AUTO: 0.08 X10(3) UL (ref 0–0.7)
EOSINOPHIL NFR BLD AUTO: 1.2 %
ERYTHROCYTE [DISTWIDTH] IN BLOOD BY AUTOMATED COUNT: 11.4 %
EST. AVERAGE GLUCOSE BLD GHB EST-MCNC: 200 MG/DL (ref 68–126)
FASTING PATIENT LIPID ANSWER: YES
FASTING STATUS PATIENT QL REPORTED: YES
GLOBULIN PLAS-MCNC: 2.7 G/DL (ref 2.8–4.4)
GLUCOSE BLD-MCNC: 159 MG/DL (ref 70–99)
GLUCOSE UR STRIP.AUTO-MCNC: >=500 MG/DL
HBA1C MFR BLD: 8.6 % (ref ?–5.7)
HCT VFR BLD AUTO: 50.3 %
HDLC SERPL-MCNC: 41 MG/DL (ref 40–59)
HGB BLD-MCNC: 16 G/DL
IMM GRANULOCYTES # BLD AUTO: 0.03 X10(3) UL (ref 0–1)
IMM GRANULOCYTES NFR BLD: 0.5 %
LDLC SERPL CALC-MCNC: 48 MG/DL (ref ?–100)
LEUKOCYTE ESTERASE UR QL STRIP.AUTO: NEGATIVE
LYMPHOCYTES # BLD AUTO: 1.45 X10(3) UL (ref 1–4)
LYMPHOCYTES NFR BLD AUTO: 22.5 %
MCH RBC QN AUTO: 30.2 PG (ref 26–34)
MCHC RBC AUTO-ENTMCNC: 31.8 G/DL (ref 31–37)
MCV RBC AUTO: 94.9 FL
MICROALBUMIN UR-MCNC: 0.78 MG/DL
MICROALBUMIN/CREAT 24H UR-RTO: 22.9 UG/MG (ref ?–30)
MONOCYTES # BLD AUTO: 0.53 X10(3) UL (ref 0.1–1)
MONOCYTES NFR BLD AUTO: 8.2 %
NEUTROPHILS # BLD AUTO: 4.29 X10 (3) UL (ref 1.5–7.7)
NEUTROPHILS # BLD AUTO: 4.29 X10(3) UL (ref 1.5–7.7)
NEUTROPHILS NFR BLD AUTO: 66.5 %
NITRITE UR QL STRIP.AUTO: NEGATIVE
NONHDLC SERPL-MCNC: 81 MG/DL (ref ?–130)
OSMOLALITY SERPL CALC.SUM OF ELEC: 285 MOSM/KG (ref 275–295)
PH UR STRIP.AUTO: 5 [PH] (ref 5–8)
PLATELET # BLD AUTO: 135 10(3)UL (ref 150–450)
POTASSIUM SERPL-SCNC: 4.3 MMOL/L (ref 3.5–5.1)
PROT SERPL-MCNC: 6.7 G/DL (ref 6.4–8.2)
PROT UR STRIP.AUTO-MCNC: NEGATIVE MG/DL
RBC # BLD AUTO: 5.3 X10(6)UL
SODIUM SERPL-SCNC: 135 MMOL/L (ref 136–145)
SP GR UR STRIP.AUTO: 1.01 (ref 1–1.03)
TRIGL SERPL-MCNC: 202 MG/DL (ref 30–149)
TSI SER-ACNC: 3.33 MIU/ML (ref 0.36–3.74)
UROBILINOGEN UR STRIP.AUTO-MCNC: <2 MG/DL
VLDLC SERPL CALC-MCNC: 29 MG/DL (ref 0–30)
WBC # BLD AUTO: 6.5 X10(3) UL (ref 4–11)

## 2023-07-28 PROCEDURE — 83036 HEMOGLOBIN GLYCOSYLATED A1C: CPT | Performed by: INTERNAL MEDICINE

## 2023-07-28 PROCEDURE — 3052F HG A1C>EQUAL 8.0%<EQUAL 9.0%: CPT | Performed by: NURSE PRACTITIONER

## 2023-07-28 PROCEDURE — 82043 UR ALBUMIN QUANTITATIVE: CPT | Performed by: INTERNAL MEDICINE

## 2023-07-28 PROCEDURE — 84153 ASSAY OF PSA TOTAL: CPT | Performed by: INTERNAL MEDICINE

## 2023-07-28 PROCEDURE — 3061F NEG MICROALBUMINURIA REV: CPT | Performed by: NURSE PRACTITIONER

## 2023-07-28 PROCEDURE — 81001 URINALYSIS AUTO W/SCOPE: CPT | Performed by: INTERNAL MEDICINE

## 2023-07-28 PROCEDURE — 82570 ASSAY OF URINE CREATININE: CPT | Performed by: INTERNAL MEDICINE

## 2023-07-28 PROCEDURE — 80050 GENERAL HEALTH PANEL: CPT | Performed by: INTERNAL MEDICINE

## 2023-07-28 PROCEDURE — 80061 LIPID PANEL: CPT | Performed by: INTERNAL MEDICINE

## 2023-07-29 ENCOUNTER — OFFICE VISIT (OUTPATIENT)
Dept: INTERNAL MEDICINE CLINIC | Facility: CLINIC | Age: 58
End: 2023-07-29
Payer: COMMERCIAL

## 2023-07-29 VITALS
RESPIRATION RATE: 16 BRPM | TEMPERATURE: 98 F | HEIGHT: 74 IN | OXYGEN SATURATION: 99 % | WEIGHT: 224 LBS | BODY MASS INDEX: 28.75 KG/M2 | DIASTOLIC BLOOD PRESSURE: 78 MMHG | SYSTOLIC BLOOD PRESSURE: 122 MMHG | HEART RATE: 94 BPM

## 2023-07-29 DIAGNOSIS — J45.40 MODERATE PERSISTENT ASTHMA WITHOUT COMPLICATION: ICD-10-CM

## 2023-07-29 DIAGNOSIS — D69.6 THROMBOCYTOPENIA (HCC): ICD-10-CM

## 2023-07-29 DIAGNOSIS — C91.10 CLL (CHRONIC LYMPHOCYTIC LEUKEMIA) (HCC): ICD-10-CM

## 2023-07-29 DIAGNOSIS — E11.9 TYPE 2 DIABETES MELLITUS WITHOUT COMPLICATION, WITHOUT LONG-TERM CURRENT USE OF INSULIN (HCC): Primary | ICD-10-CM

## 2023-07-29 PROCEDURE — 3074F SYST BP LT 130 MM HG: CPT | Performed by: NURSE PRACTITIONER

## 2023-07-29 PROCEDURE — 3072F LOW RISK FOR RETINOPATHY: CPT | Performed by: NURSE PRACTITIONER

## 2023-07-29 PROCEDURE — 99214 OFFICE O/P EST MOD 30 MIN: CPT | Performed by: NURSE PRACTITIONER

## 2023-07-29 PROCEDURE — 3078F DIAST BP <80 MM HG: CPT | Performed by: NURSE PRACTITIONER

## 2023-07-29 PROCEDURE — 3008F BODY MASS INDEX DOCD: CPT | Performed by: NURSE PRACTITIONER

## 2023-08-24 DIAGNOSIS — E11.9 TYPE 2 DIABETES MELLITUS WITHOUT COMPLICATION, WITHOUT LONG-TERM CURRENT USE OF INSULIN (HCC): ICD-10-CM

## 2023-08-24 NOTE — TELEPHONE ENCOUNTER
Last time medication was refilled 6/12/23  Quantity and # of refills 180 W 0  Last OV 7/29/23  Next OV 2/16/24  FAILED PROTOCOL.  Sent to Dr. Rosalee Garland for approval.

## 2023-09-06 NOTE — PROGRESS NOTES
Outpatient Oncology Care Plan  Problem list:  knowledge deficit    Problems related to:    disease/disease progression    Interventions:  provided general teaching    Expected outcomes:  understands plan of care    Progress towards outcome:  resolved    Ed negative

## 2023-09-12 ENCOUNTER — TELEMEDICINE (OUTPATIENT)
Dept: INTERNAL MEDICINE CLINIC | Facility: CLINIC | Age: 58
End: 2023-09-12
Payer: COMMERCIAL

## 2023-09-12 DIAGNOSIS — E11.9 TYPE 2 DIABETES MELLITUS WITHOUT COMPLICATION, WITHOUT LONG-TERM CURRENT USE OF INSULIN (HCC): ICD-10-CM

## 2023-09-12 DIAGNOSIS — R19.7 DIARRHEA OF PRESUMED INFECTIOUS ORIGIN: Primary | ICD-10-CM

## 2023-09-12 DIAGNOSIS — C91.10 CLL (CHRONIC LYMPHOCYTIC LEUKEMIA) (HCC): ICD-10-CM

## 2023-09-12 PROCEDURE — 99214 OFFICE O/P EST MOD 30 MIN: CPT | Performed by: PHYSICIAN ASSISTANT

## 2023-09-13 ENCOUNTER — LAB ENCOUNTER (OUTPATIENT)
Dept: LAB | Age: 58
End: 2023-09-13
Attending: PHYSICIAN ASSISTANT
Payer: COMMERCIAL

## 2023-09-13 DIAGNOSIS — R19.7 DIARRHEA OF PRESUMED INFECTIOUS ORIGIN: ICD-10-CM

## 2023-09-13 LAB
ALBUMIN SERPL-MCNC: 3.3 G/DL (ref 3.4–5)
ALBUMIN/GLOB SERPL: 1.1 {RATIO} (ref 1–2)
ALP LIVER SERPL-CCNC: 133 U/L
ALT SERPL-CCNC: 227 U/L
ANION GAP SERPL CALC-SCNC: 4 MMOL/L (ref 0–18)
AST SERPL-CCNC: 129 U/L (ref 15–37)
BASOPHILS # BLD AUTO: 0.04 X10(3) UL (ref 0–0.2)
BASOPHILS NFR BLD AUTO: 0.6 %
BILIRUB SERPL-MCNC: 0.8 MG/DL (ref 0.1–2)
BUN BLD-MCNC: 14 MG/DL (ref 7–18)
CALCIUM BLD-MCNC: 8.5 MG/DL (ref 8.5–10.1)
CHLORIDE SERPL-SCNC: 104 MMOL/L (ref 98–112)
CO2 SERPL-SCNC: 26 MMOL/L (ref 21–32)
CREAT BLD-MCNC: 0.91 MG/DL
EGFRCR SERPLBLD CKD-EPI 2021: 98 ML/MIN/1.73M2 (ref 60–?)
EOSINOPHIL # BLD AUTO: 0.16 X10(3) UL (ref 0–0.7)
EOSINOPHIL NFR BLD AUTO: 2.3 %
ERYTHROCYTE [DISTWIDTH] IN BLOOD BY AUTOMATED COUNT: 12 %
FASTING STATUS PATIENT QL REPORTED: NO
GLOBULIN PLAS-MCNC: 3 G/DL (ref 2.8–4.4)
GLUCOSE BLD-MCNC: 170 MG/DL (ref 70–99)
HCT VFR BLD AUTO: 44.6 %
HGB BLD-MCNC: 14.5 G/DL
IMM GRANULOCYTES # BLD AUTO: 0.03 X10(3) UL (ref 0–1)
IMM GRANULOCYTES NFR BLD: 0.4 %
LIPASE SERPL-CCNC: 202 U/L (ref 13–75)
LYMPHOCYTES # BLD AUTO: 1.23 X10(3) UL (ref 1–4)
LYMPHOCYTES NFR BLD AUTO: 17.9 %
MCH RBC QN AUTO: 29.5 PG (ref 26–34)
MCHC RBC AUTO-ENTMCNC: 32.5 G/DL (ref 31–37)
MCV RBC AUTO: 90.7 FL
MONOCYTES # BLD AUTO: 0.87 X10(3) UL (ref 0.1–1)
MONOCYTES NFR BLD AUTO: 12.6 %
NEUTROPHILS # BLD AUTO: 4.55 X10 (3) UL (ref 1.5–7.7)
NEUTROPHILS # BLD AUTO: 4.55 X10(3) UL (ref 1.5–7.7)
NEUTROPHILS NFR BLD AUTO: 66.2 %
OSMOLALITY SERPL CALC.SUM OF ELEC: 282 MOSM/KG (ref 275–295)
PLATELET # BLD AUTO: 121 10(3)UL (ref 150–450)
POTASSIUM SERPL-SCNC: 4 MMOL/L (ref 3.5–5.1)
PROT SERPL-MCNC: 6.3 G/DL (ref 6.4–8.2)
RBC # BLD AUTO: 4.92 X10(6)UL
SODIUM SERPL-SCNC: 134 MMOL/L (ref 136–145)
WBC # BLD AUTO: 6.9 X10(3) UL (ref 4–11)

## 2023-09-13 PROCEDURE — 83690 ASSAY OF LIPASE: CPT | Performed by: PHYSICIAN ASSISTANT

## 2023-09-13 PROCEDURE — 85025 COMPLETE CBC W/AUTO DIFF WBC: CPT | Performed by: PHYSICIAN ASSISTANT

## 2023-09-13 PROCEDURE — 80053 COMPREHEN METABOLIC PANEL: CPT | Performed by: PHYSICIAN ASSISTANT

## 2023-09-14 ENCOUNTER — HOSPITAL ENCOUNTER (EMERGENCY)
Facility: HOSPITAL | Age: 58
Discharge: LEFT BEFORE DISCHARGE | End: 2023-09-14
Attending: EMERGENCY MEDICINE
Payer: COMMERCIAL

## 2023-09-14 ENCOUNTER — APPOINTMENT (OUTPATIENT)
Dept: ULTRASOUND IMAGING | Facility: HOSPITAL | Age: 58
End: 2023-09-14
Attending: EMERGENCY MEDICINE
Payer: COMMERCIAL

## 2023-09-14 VITALS
SYSTOLIC BLOOD PRESSURE: 115 MMHG | WEIGHT: 222.69 LBS | OXYGEN SATURATION: 95 % | HEIGHT: 74 IN | BODY MASS INDEX: 28.58 KG/M2 | DIASTOLIC BLOOD PRESSURE: 69 MMHG | HEART RATE: 85 BPM | TEMPERATURE: 98 F | RESPIRATION RATE: 18 BRPM

## 2023-09-14 DIAGNOSIS — R79.89 ELEVATED LFTS: Primary | ICD-10-CM

## 2023-09-14 DIAGNOSIS — K85.90 ACUTE PANCREATITIS, UNSPECIFIED COMPLICATION STATUS, UNSPECIFIED PANCREATITIS TYPE: ICD-10-CM

## 2023-09-14 LAB
ALBUMIN SERPL-MCNC: 3.5 G/DL (ref 3.4–5)
ALBUMIN/GLOB SERPL: 1.1 {RATIO} (ref 1–2)
ALP LIVER SERPL-CCNC: 138 U/L
ALT SERPL-CCNC: 188 U/L
ANION GAP SERPL CALC-SCNC: 3 MMOL/L (ref 0–18)
AST SERPL-CCNC: 83 U/L (ref 15–37)
BASOPHILS # BLD AUTO: 0.05 X10(3) UL (ref 0–0.2)
BASOPHILS NFR BLD AUTO: 0.9 %
BILIRUB SERPL-MCNC: 0.5 MG/DL (ref 0.1–2)
BUN BLD-MCNC: 9 MG/DL (ref 7–18)
CALCIUM BLD-MCNC: 8.6 MG/DL (ref 8.5–10.1)
CHLORIDE SERPL-SCNC: 107 MMOL/L (ref 98–112)
CO2 SERPL-SCNC: 26 MMOL/L (ref 21–32)
CREAT BLD-MCNC: 0.76 MG/DL
EGFRCR SERPLBLD CKD-EPI 2021: 104 ML/MIN/1.73M2 (ref 60–?)
EOSINOPHIL # BLD AUTO: 0.23 X10(3) UL (ref 0–0.7)
EOSINOPHIL NFR BLD AUTO: 4.1 %
ERYTHROCYTE [DISTWIDTH] IN BLOOD BY AUTOMATED COUNT: 12.1 %
GLOBULIN PLAS-MCNC: 3.1 G/DL (ref 2.8–4.4)
GLUCOSE BLD-MCNC: 189 MG/DL (ref 70–99)
HCT VFR BLD AUTO: 44.1 %
HGB BLD-MCNC: 15.1 G/DL
IMM GRANULOCYTES # BLD AUTO: 0.01 X10(3) UL (ref 0–1)
IMM GRANULOCYTES NFR BLD: 0.2 %
LIPASE SERPL-CCNC: 693 U/L (ref 13–75)
LYMPHOCYTES # BLD AUTO: 1.37 X10(3) UL (ref 1–4)
LYMPHOCYTES NFR BLD AUTO: 24.6 %
MCH RBC QN AUTO: 29.9 PG (ref 26–34)
MCHC RBC AUTO-ENTMCNC: 34.2 G/DL (ref 31–37)
MCV RBC AUTO: 87.3 FL
MONOCYTES # BLD AUTO: 0.64 X10(3) UL (ref 0.1–1)
MONOCYTES NFR BLD AUTO: 11.5 %
NEUTROPHILS # BLD AUTO: 3.26 X10 (3) UL (ref 1.5–7.7)
NEUTROPHILS # BLD AUTO: 3.26 X10(3) UL (ref 1.5–7.7)
NEUTROPHILS NFR BLD AUTO: 58.7 %
OSMOLALITY SERPL CALC.SUM OF ELEC: 286 MOSM/KG (ref 275–295)
PLATELET # BLD AUTO: 144 10(3)UL (ref 150–450)
POTASSIUM SERPL-SCNC: 4.2 MMOL/L (ref 3.5–5.1)
PROT SERPL-MCNC: 6.6 G/DL (ref 6.4–8.2)
RBC # BLD AUTO: 5.05 X10(6)UL
SODIUM SERPL-SCNC: 136 MMOL/L (ref 136–145)
WBC # BLD AUTO: 5.6 X10(3) UL (ref 4–11)

## 2023-09-14 PROCEDURE — 96360 HYDRATION IV INFUSION INIT: CPT

## 2023-09-14 PROCEDURE — 80053 COMPREHEN METABOLIC PANEL: CPT | Performed by: EMERGENCY MEDICINE

## 2023-09-14 PROCEDURE — 93010 ELECTROCARDIOGRAM REPORT: CPT

## 2023-09-14 PROCEDURE — 99285 EMERGENCY DEPT VISIT HI MDM: CPT

## 2023-09-14 PROCEDURE — 80053 COMPREHEN METABOLIC PANEL: CPT

## 2023-09-14 PROCEDURE — 99284 EMERGENCY DEPT VISIT MOD MDM: CPT

## 2023-09-14 PROCEDURE — 85025 COMPLETE CBC W/AUTO DIFF WBC: CPT

## 2023-09-14 PROCEDURE — 93005 ELECTROCARDIOGRAM TRACING: CPT

## 2023-09-14 PROCEDURE — 76700 US EXAM ABDOM COMPLETE: CPT | Performed by: EMERGENCY MEDICINE

## 2023-09-14 PROCEDURE — 83690 ASSAY OF LIPASE: CPT

## 2023-09-14 PROCEDURE — 96361 HYDRATE IV INFUSION ADD-ON: CPT

## 2023-09-14 PROCEDURE — 83690 ASSAY OF LIPASE: CPT | Performed by: EMERGENCY MEDICINE

## 2023-09-14 PROCEDURE — 85025 COMPLETE CBC W/AUTO DIFF WBC: CPT | Performed by: EMERGENCY MEDICINE

## 2023-09-14 RX ORDER — SODIUM CHLORIDE, SODIUM LACTATE, POTASSIUM CHLORIDE, CALCIUM CHLORIDE 600; 310; 30; 20 MG/100ML; MG/100ML; MG/100ML; MG/100ML
INJECTION, SOLUTION INTRAVENOUS ONCE
Status: COMPLETED | OUTPATIENT
Start: 2023-09-14 | End: 2023-09-14

## 2023-09-15 ENCOUNTER — LAB ENCOUNTER (OUTPATIENT)
Dept: LAB | Age: 58
End: 2023-09-15
Attending: PHYSICIAN ASSISTANT
Payer: COMMERCIAL

## 2023-09-15 ENCOUNTER — OFFICE VISIT (OUTPATIENT)
Dept: INTERNAL MEDICINE CLINIC | Facility: CLINIC | Age: 58
End: 2023-09-15
Payer: COMMERCIAL

## 2023-09-15 VITALS
TEMPERATURE: 97 F | OXYGEN SATURATION: 97 % | HEIGHT: 74 IN | DIASTOLIC BLOOD PRESSURE: 64 MMHG | BODY MASS INDEX: 28.75 KG/M2 | SYSTOLIC BLOOD PRESSURE: 136 MMHG | HEART RATE: 99 BPM | RESPIRATION RATE: 16 BRPM | WEIGHT: 224 LBS

## 2023-09-15 DIAGNOSIS — R79.89 ELEVATED LFTS: ICD-10-CM

## 2023-09-15 DIAGNOSIS — K85.90 ACUTE PANCREATITIS, UNSPECIFIED COMPLICATION STATUS, UNSPECIFIED PANCREATITIS TYPE: ICD-10-CM

## 2023-09-15 DIAGNOSIS — K85.90 ACUTE PANCREATITIS, UNSPECIFIED COMPLICATION STATUS, UNSPECIFIED PANCREATITIS TYPE: Primary | ICD-10-CM

## 2023-09-15 LAB
ALBUMIN SERPL-MCNC: 3.6 G/DL (ref 3.4–5)
ALBUMIN/GLOB SERPL: 1.2 {RATIO} (ref 1–2)
ALP LIVER SERPL-CCNC: 135 U/L
ALT SERPL-CCNC: 148 U/L
ANION GAP SERPL CALC-SCNC: 6 MMOL/L (ref 0–18)
AST SERPL-CCNC: 47 U/L (ref 15–37)
ATRIAL RATE: 80 BPM
BILIRUB SERPL-MCNC: 0.4 MG/DL (ref 0.1–2)
BUN BLD-MCNC: 11 MG/DL (ref 7–18)
CALCIUM BLD-MCNC: 8.9 MG/DL (ref 8.5–10.1)
CHLORIDE SERPL-SCNC: 105 MMOL/L (ref 98–112)
CO2 SERPL-SCNC: 27 MMOL/L (ref 21–32)
CREAT BLD-MCNC: 0.92 MG/DL
EGFRCR SERPLBLD CKD-EPI 2021: 96 ML/MIN/1.73M2 (ref 60–?)
FASTING STATUS PATIENT QL REPORTED: NO
GLOBULIN PLAS-MCNC: 2.9 G/DL (ref 2.8–4.4)
GLUCOSE BLD-MCNC: 160 MG/DL (ref 70–99)
LIPASE SERPL-CCNC: 99 U/L (ref 13–75)
OSMOLALITY SERPL CALC.SUM OF ELEC: 289 MOSM/KG (ref 275–295)
P AXIS: 30 DEGREES
P-R INTERVAL: 156 MS
POTASSIUM SERPL-SCNC: 4.1 MMOL/L (ref 3.5–5.1)
PROT SERPL-MCNC: 6.5 G/DL (ref 6.4–8.2)
Q-T INTERVAL: 372 MS
QRS DURATION: 82 MS
QTC CALCULATION (BEZET): 429 MS
R AXIS: 38 DEGREES
SODIUM SERPL-SCNC: 138 MMOL/L (ref 136–145)
T AXIS: 30 DEGREES
VENTRICULAR RATE: 80 BPM

## 2023-09-15 PROCEDURE — 80053 COMPREHEN METABOLIC PANEL: CPT

## 2023-09-15 PROCEDURE — 99214 OFFICE O/P EST MOD 30 MIN: CPT | Performed by: PHYSICIAN ASSISTANT

## 2023-09-15 PROCEDURE — 3078F DIAST BP <80 MM HG: CPT | Performed by: PHYSICIAN ASSISTANT

## 2023-09-15 PROCEDURE — 3075F SYST BP GE 130 - 139MM HG: CPT | Performed by: PHYSICIAN ASSISTANT

## 2023-09-15 PROCEDURE — 36415 COLL VENOUS BLD VENIPUNCTURE: CPT

## 2023-09-15 PROCEDURE — 83690 ASSAY OF LIPASE: CPT

## 2023-09-15 PROCEDURE — 3008F BODY MASS INDEX DOCD: CPT | Performed by: PHYSICIAN ASSISTANT

## 2023-09-15 NOTE — DISCHARGE INSTRUCTIONS
Levels diet    Pepto-Bismol or Imodium for diarrhea    Recommend you call your primary doctor and have your lipase and LFTs repeated

## 2023-09-22 ENCOUNTER — LAB ENCOUNTER (OUTPATIENT)
Dept: LAB | Age: 58
End: 2023-09-22
Attending: INTERNAL MEDICINE
Payer: COMMERCIAL

## 2023-09-22 ENCOUNTER — LAB ENCOUNTER (OUTPATIENT)
Dept: LAB | Age: 58
End: 2023-09-22
Attending: NURSE PRACTITIONER
Payer: COMMERCIAL

## 2023-09-22 DIAGNOSIS — E11.9 TYPE 2 DIABETES MELLITUS WITHOUT COMPLICATION, WITHOUT LONG-TERM CURRENT USE OF INSULIN (HCC): ICD-10-CM

## 2023-09-22 DIAGNOSIS — R79.89 ABNORMAL LFTS: ICD-10-CM

## 2023-09-22 LAB
ALBUMIN SERPL-MCNC: 3.9 G/DL (ref 3.4–5)
ALP LIVER SERPL-CCNC: 77 U/L
ALT SERPL-CCNC: 54 U/L
AST SERPL-CCNC: 30 U/L (ref 15–37)
BILIRUB DIRECT SERPL-MCNC: 0.2 MG/DL (ref 0–0.2)
BILIRUB SERPL-MCNC: 0.7 MG/DL (ref 0.1–2)
HAV IGM SER QL: NONREACTIVE
HBV CORE IGM SER QL: NONREACTIVE
HBV SURFACE AG SERPL QL IA: NONREACTIVE
HCV AB SERPL QL IA: NONREACTIVE
PROT SERPL-MCNC: 7 G/DL (ref 6.4–8.2)

## 2023-09-22 PROCEDURE — 83036 HEMOGLOBIN GLYCOSYLATED A1C: CPT | Performed by: NURSE PRACTITIONER

## 2023-09-23 LAB
EST. AVERAGE GLUCOSE BLD GHB EST-MCNC: 203 MG/DL (ref 68–126)
HBA1C MFR BLD: 8.7 % (ref ?–5.7)

## 2023-09-23 NOTE — LETTER
Printed: 2018    Patient Name: Luz Kelley  : 1965   Medical Record #: [id number]    Consent to Cancer Treatment    I, Luz Kelley, understand that I have been diagnosed with CLL.     I understand that the treatment suggested by my doctor DMG/Cardiology Progress Note      Homer Bruce  61 year old  [id number]  Fortino Velásquez MD Bhagwakar, Ojash, MD    IMPRESSION:     1. Coronary artery disease status post bypass surgery 2023 with LIMA to LAD, SVG to OM, SVG to diagonal, SVG to PDA  2. NSTEMI on presentation  3. Hyperlipidemia  4. Diabetes  5. MR  Moderate         RECOMMENDATIONS:     1. off levo,  off dobutamine   2. Amiodarone  per protocol  3. Aspirin, statin rosuvastatin 20 mg daily  4.  beta-blocker   5. Rehab/IS / up in chair   6. Sugar control    progressing    Subjective:  No chest pain or shortness of breath.    mild incisional discomfort     Objective:  Visit Vitals  /55   Pulse 62   Temp 97.7 °F (36.5 °C) (Temporal)   Resp 19   Ht 5' 5\" (1.651 m)   Wt 67.4 kg (148 lb 9.4 oz)   SpO2 99%   BMI 24.73 kg/m²       Temp (24hrs), Av.1 °F (36.7 °C), Min:97.7 °F (36.5 °C), Max:98.6 °F (37 °C)      Intake/Output:    Intake/Output Summary (Last 24 hours) at 2023 1055  Last data filed at 2023 2300  Gross per 24 hour   Intake --   Output 950 ml   Net -950 ml       Wt Readings from Last 3 Encounters:   23 67.4 kg (148 lb 9.4 oz)       Allergies:  ALLERGIES:  No Known Allergies    Physical Exam:  Blood pressure 121/55, pulse 62, temperature 97.7 °F (36.5 °C), temperature source Temporal, resp. rate 19, height 5' 5\" (1.651 m), weight 67.4 kg (148 lb 9.4 oz), SpO2 99 %.  General: Alert and oriented in no apparent distress.  HEENT: No focal deficits.  Neck: No JVD, carotids no bruits. R Int jug line   Cardiac: Regular rate and rhythm, S1, S2 normal, no murmur, rub or gallop.  Lungs: Clear ant  CT   Abdomen: Soft, non-tender.   : lauren   Extremities: Without clubbing, cyanosis or edema.  Peripheral pulses present  Neurologic: Alert and oriented, normal affect.  Skin: Warm and dry. Sternal incision w/o drainag e  Psych: Appropriate      Laboratory/Data:  Diagnostics:     TELE: NSR /     Echo: 23    1. Left  Additional written information will be given to me prior to start of therapy. Additionally, I will receive a copy of this consent form. I have read and fully understand this consent to cancer treatment.  I acknowledge that the explanations above were ventricle: The cavity size is normal. Wall thickness is at the upper     limits of normal. Systolic function is normal. The ejection fraction was     measured by biplane method of disks. Wall motion is normal; there are no     regional wall motion abnormalities. The ejection fraction is 60%.  2. Mitral valve: There is moderate regurgitation, directed eccentrically and     toward the free wall.  3. Right ventricle: The cavity size is normal. Wall thickness is normal.     Systolic function is normal.  4. Tricuspid valve: There is mild regurgitation.  5. Pericardium, extracardiac: There is no pericardial effusion.  6. Baseline ECG: Normal sinus rhythm.    Labs:  @EDWBRIEFLAB(WBC,DIFF,HGB,HCT,MCV,PLT,BMP,trop)@  Lab Results   Component Value Date    PT 13.1 (H) 09/22/2023    PT 12.8 (H) 09/21/2023    PT 15.5 (H) 09/20/2023    INR 1.2 09/22/2023    INR 1.2 09/21/2023    INR 1.5 09/20/2023         Imaging:  US VASC CAROTID DUPLEX BILATERAL   Final Result   Increased velocity measurements in the external carotid   arteries bilaterally. Stenosis.      Rest of the carotid system, no increased velocity measurements..         The velocity parameters used for stenosis estimation correlate to the   residual internal carotid lumen with methods based on the distal carotid   lumen.            Electronically Signed by: GRETA MARTÍNEZ M.D.    Signed on: 9/19/2023 6:17 PM    Workstation ID: [id number]      Cath/PV Case   Final Result      CTA CHEST   Final Result   No aortic dissection.      Electronically Signed by: GRETA MARTÍNEZ M.D.    Signed on: 9/18/2023 8:32 PM    Workstation ID: [id number]      CTA ABDOMEN PELVIS   Final Result   No aortic dissection.      Gastritis, duodenitis/ulcer disease. Clinical correlation. Endoscopy .         Electronically Signed by: GRETA MARTÍNEZ M.D.    Signed on: 9/18/2023 8:31 PM    Workstation ID: [id number]      XR CHEST PA OR AP 1 VIEW   Final Result      No focal airspace consolidation or  pneumothorax.      Electronically Signed by: BONY TOM M.D.    Signed on: 9/18/2023 7:31 PM    Workstation ID: [id number]           Medications:  Current Facility-Administered Medications   Medication   • furosemide (LASIX INJECT) injection 40 mg   • insulin glargine (LANTUS) injection 12 Units   • heparin (porcine) injection 5,000 Units   • colchicine (COLCRYS) tablet 0.6 mg   • rosuvastatin (CRESTOR) tablet 20 mg   • insulin lispro (ADMELOG,HumaLOG) - Correction Dose   • polyethylene glycol (MIRALAX) packet 17 g   • acetaminophen (TYLENOL) tablet 1,000 mg    Followed by   • [START ON 9/25/2023] acetaminophen (TYLENOL) tablet 650 mg   • HYDROcodone-acetaminophen (NORCO) 5-325 MG per tablet 1 tablet   • ondansetron (ZOFRAN ODT) disintegrating tablet 4 mg    Or   • ondansetron (ZOFRAN) injection 4 mg   • AMIODarone (PACERONE) tablet 200 mg   • aspirin (ECOTRIN) enteric coated tablet 81 mg   • docusate sodium-sennosides (SENOKOT S) 50-8.6 MG 2 tablet   • bisacodyl (DULCOLAX) suppository 10 mg   • dextrose 50 % injection 25 g   • dextrose 50 % injection 12.5 g   • glucagon (GLUCAGEN) injection 1 mg   • dextrose (GLUTOSE) 40 % gel 15 g   • dextrose (GLUTOSE) 40 % gel 30 g   • morphine injection 2 mg   • albuterol (VENTOLIN) nebulizer 2.5 mg   • sodium chloride 0.9 % flush bag 25 mL   • Potassium Standard Replacement Protocol (Levels 3.5 and lower)   • Magnesium Standard Replacement Protocol   • levothyroxine (SYNTHROID, LEVOTHROID) tablet 50 mcg   • pantoprazole (PROTONIX) EC tablet 40 mg       Prior to Admission medications    Medication Sig Start Date End Date Taking? Authorizing Provider   rosuvastatin (CRESTOR) 20 MG tablet Take 20 mg by mouth daily.   Yes Provider, Outside   levothyroxine 50 MCG tablet Take 50 mcg by mouth daily.   Yes Provider, Outside   insulin glargine (LANTUS) 100 UNIT/ML vial solution Inject 12 Units into the skin nightly.   Yes Provider, Outside   insulin aspart (NovoLOG FLEXPEN) 100  UNIT/ML pen-injector Inject 4 Units into the skin in the morning and 4 Units at noon and 4 Units in the evening. Inject before meals. Prime 2 units before each dose.   Yes Provider, Outside

## 2023-09-25 DIAGNOSIS — E11.9 TYPE 2 DIABETES MELLITUS WITHOUT COMPLICATION, WITHOUT LONG-TERM CURRENT USE OF INSULIN (HCC): Primary | ICD-10-CM

## 2023-09-25 NOTE — PROGRESS NOTES
Spoke to pt. Made aware of results & recommendations.  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

## 2023-09-26 DIAGNOSIS — E11.9 TYPE 2 DIABETES MELLITUS WITHOUT COMPLICATION, WITHOUT LONG-TERM CURRENT USE OF INSULIN (HCC): ICD-10-CM

## 2023-09-26 DIAGNOSIS — E78.2 MIXED HYPERLIPIDEMIA: ICD-10-CM

## 2023-09-26 RX ORDER — GLIMEPIRIDE 4 MG/1
4 TABLET ORAL 2 TIMES DAILY WITH MEALS
Qty: 180 TABLET | Refills: 1 | Status: SHIPPED | OUTPATIENT
Start: 2023-09-26

## 2023-09-26 RX ORDER — ATORVASTATIN CALCIUM 40 MG/1
TABLET, FILM COATED ORAL
Qty: 90 TABLET | Refills: 3 | Status: SHIPPED | OUTPATIENT
Start: 2023-09-26

## 2023-09-26 NOTE — TELEPHONE ENCOUNTER
Last time medication was refilled 3/26/23  Quantity and # of refills 180 w 1  Last OV 9/15/23  Next OV 2/16/24  Failed protocol.  Sent to OLIVIA [facility] for approval.

## 2023-09-26 NOTE — TELEPHONE ENCOUNTER
Last time medication was refilled 10/14/2022  Quantity and # of refills 90 w 3  Last OV 09/15/2023  Next OV 02/16/2024    Passed protocol, Rx sent.

## 2023-10-09 ENCOUNTER — PATIENT MESSAGE (OUTPATIENT)
Dept: HEMATOLOGY/ONCOLOGY | Facility: HOSPITAL | Age: 58
End: 2023-10-09

## 2023-10-09 DIAGNOSIS — C91.10 CLL (CHRONIC LYMPHOCYTIC LEUKEMIA) (HCC): ICD-10-CM

## 2023-10-09 DIAGNOSIS — D80.1 HYPOGAMMAGLOBULINEMIA (HCC): ICD-10-CM

## 2023-10-10 RX ORDER — IMMUNE GLOBULIN INFUSION (HUMAN) 100 MG/ML
INJECTION, SOLUTION INTRAVENOUS; SUBCUTANEOUS
Qty: 20 G | Refills: 11 | Status: CANCELLED
Start: 2023-10-10

## 2023-10-10 RX ORDER — IMMUNE GLOBULIN INFUSION (HUMAN) 100 MG/ML
INJECTION, SOLUTION INTRAVENOUS; SUBCUTANEOUS
Qty: 20 G | Refills: 11 | Status: SHIPPED | OUTPATIENT
Start: 2023-10-10

## 2023-10-11 ENCOUNTER — TELEPHONE (OUTPATIENT)
Dept: HEMATOLOGY/ONCOLOGY | Facility: HOSPITAL | Age: 58
End: 2023-10-11

## 2023-10-11 NOTE — TELEPHONE ENCOUNTER
Jackie Dangelo [phone]  /  Fax: [phone] We received electronic script on  the Gammagard  immune globulin human  and we need to know if this is going to be administered in outpatient or at Home either way we need Clinical notes,labs, insurance information.  Thanks [employer]

## 2023-10-13 ENCOUNTER — APPOINTMENT (OUTPATIENT)
Dept: HEMATOLOGY/ONCOLOGY | Facility: HOSPITAL | Age: 58
End: 2023-10-13
Attending: INTERNAL MEDICINE
Payer: COMMERCIAL

## 2023-10-20 ENCOUNTER — OFFICE VISIT (OUTPATIENT)
Dept: HEMATOLOGY/ONCOLOGY | Facility: HOSPITAL | Age: 58
End: 2023-10-20
Attending: INTERNAL MEDICINE
Payer: COMMERCIAL

## 2023-10-20 VITALS
HEIGHT: 74.02 IN | TEMPERATURE: 96 F | SYSTOLIC BLOOD PRESSURE: 149 MMHG | RESPIRATION RATE: 16 BRPM | WEIGHT: 225 LBS | OXYGEN SATURATION: 98 % | BODY MASS INDEX: 28.88 KG/M2 | DIASTOLIC BLOOD PRESSURE: 83 MMHG | HEART RATE: 64 BPM

## 2023-10-20 DIAGNOSIS — C91.10 CLL (CHRONIC LYMPHOCYTIC LEUKEMIA) (HCC): Primary | ICD-10-CM

## 2023-10-20 DIAGNOSIS — C91.10 CLL (CHRONIC LYMPHOCYTIC LEUKEMIA) (HCC): ICD-10-CM

## 2023-10-20 LAB
ALBUMIN SERPL-MCNC: 3.9 G/DL (ref 3.4–5)
ALBUMIN/GLOB SERPL: 1.3 {RATIO} (ref 1–2)
ALP LIVER SERPL-CCNC: 56 U/L
ALT SERPL-CCNC: 35 U/L
ANION GAP SERPL CALC-SCNC: 4 MMOL/L (ref 0–18)
AST SERPL-CCNC: 21 U/L (ref 15–37)
BASOPHILS # BLD AUTO: 0.09 X10(3) UL (ref 0–0.2)
BASOPHILS NFR BLD AUTO: 1.2 %
BILIRUB SERPL-MCNC: 0.6 MG/DL (ref 0.1–2)
BUN BLD-MCNC: 14 MG/DL (ref 7–18)
CALCIUM BLD-MCNC: 9.3 MG/DL (ref 8.5–10.1)
CHLORIDE SERPL-SCNC: 107 MMOL/L (ref 98–112)
CO2 SERPL-SCNC: 27 MMOL/L (ref 21–32)
CREAT BLD-MCNC: 0.94 MG/DL
EGFRCR SERPLBLD CKD-EPI 2021: 94 ML/MIN/1.73M2 (ref 60–?)
EOSINOPHIL # BLD AUTO: 0.12 X10(3) UL (ref 0–0.7)
EOSINOPHIL NFR BLD AUTO: 1.7 %
ERYTHROCYTE [DISTWIDTH] IN BLOOD BY AUTOMATED COUNT: 12.5 %
FASTING STATUS PATIENT QL REPORTED: NO
GLOBULIN PLAS-MCNC: 3.1 G/DL (ref 2.8–4.4)
GLUCOSE BLD-MCNC: 148 MG/DL (ref 70–99)
HCT VFR BLD AUTO: 48 %
HGB BLD-MCNC: 15.9 G/DL
IMM GRANULOCYTES # BLD AUTO: 0.03 X10(3) UL (ref 0–1)
IMM GRANULOCYTES NFR BLD: 0.4 %
IMMUNOGLOBULIN PNL SER-MCNC: 300 MG/DL (ref 791–1643)
LDH SERPL L TO P-CCNC: 119 U/L
LYMPHOCYTES # BLD AUTO: 1.95 X10(3) UL (ref 1–4)
LYMPHOCYTES NFR BLD AUTO: 26.8 %
MCH RBC QN AUTO: 30.2 PG (ref 26–34)
MCHC RBC AUTO-ENTMCNC: 33.1 G/DL (ref 31–37)
MCV RBC AUTO: 91.3 FL
MONOCYTES # BLD AUTO: 0.61 X10(3) UL (ref 0.1–1)
MONOCYTES NFR BLD AUTO: 8.4 %
NEUTROPHILS # BLD AUTO: 4.47 X10 (3) UL (ref 1.5–7.7)
NEUTROPHILS # BLD AUTO: 4.47 X10(3) UL (ref 1.5–7.7)
NEUTROPHILS NFR BLD AUTO: 61.5 %
OSMOLALITY SERPL CALC.SUM OF ELEC: 289 MOSM/KG (ref 275–295)
PLATELET # BLD AUTO: 146 10(3)UL (ref 150–450)
POTASSIUM SERPL-SCNC: 4.3 MMOL/L (ref 3.5–5.1)
PROT SERPL-MCNC: 7 G/DL (ref 6.4–8.2)
RBC # BLD AUTO: 5.26 X10(6)UL
SODIUM SERPL-SCNC: 138 MMOL/L (ref 136–145)
WBC # BLD AUTO: 7.3 X10(3) UL (ref 4–11)

## 2023-10-20 PROCEDURE — 96366 THER/PROPH/DIAG IV INF ADDON: CPT

## 2023-10-20 PROCEDURE — 96365 THER/PROPH/DIAG IV INF INIT: CPT

## 2023-10-20 PROCEDURE — 99215 OFFICE O/P EST HI 40 MIN: CPT | Performed by: INTERNAL MEDICINE

## 2023-10-20 RX ORDER — DIPHENHYDRAMINE HCL 25 MG
25 CAPSULE ORAL ONCE
Status: COMPLETED | OUTPATIENT
Start: 2023-10-20 | End: 2023-10-20

## 2023-10-20 RX ORDER — CEPHALEXIN 500 MG/1
500 CAPSULE ORAL 4 TIMES DAILY
Qty: 28 CAPSULE | Refills: 0 | Status: SHIPPED | OUTPATIENT
Start: 2023-10-20 | End: 2023-10-27

## 2023-10-20 RX ORDER — DIPHENHYDRAMINE HCL 25 MG
25 CAPSULE ORAL ONCE
OUTPATIENT
Start: 2023-10-20

## 2023-10-20 RX ORDER — ACETAMINOPHEN 325 MG/1
650 TABLET ORAL ONCE
OUTPATIENT
Start: 2023-10-20

## 2023-10-20 RX ORDER — ACETAMINOPHEN 325 MG/1
650 TABLET ORAL ONCE
Status: COMPLETED | OUTPATIENT
Start: 2023-10-20 | End: 2023-10-20

## 2023-10-20 RX ADMIN — DIPHENHYDRAMINE HCL 25 MG: 25 MG CAPSULE ORAL at 08:49:00

## 2023-10-20 RX ADMIN — ACETAMINOPHEN 650 MG: 325 TABLET ORAL at 08:49:00

## 2023-10-20 NOTE — PROGRESS NOTES
Pt here for  IVIG . Pt denies any issues or concerns. Ordering MD: Fuad Torres Exp: ongoing     Pt tolerated infusion without difficulty or complaint.  Reviewed next apt date/time: yes      Education Record  Learner:  Patient  Disease / Diagnosis: CLL  Barriers / Limitations:  None  Method:  Discussion  General Topics:  Plan of care reviewed  Outcome:  Shows understanding

## 2023-10-23 ENCOUNTER — PATIENT MESSAGE (OUTPATIENT)
Dept: INTERNAL MEDICINE CLINIC | Facility: CLINIC | Age: 58
End: 2023-10-23

## 2023-10-23 NOTE — TELEPHONE ENCOUNTER
From: Gerry Castaneda  To: Howie Graham  Sent: 10/23/2023 4:12 PM CDT  Subject: liver tests    were done last friday, #s are still normal  sugar was 148

## 2023-10-27 ENCOUNTER — APPOINTMENT (OUTPATIENT)
Dept: HEMATOLOGY/ONCOLOGY | Facility: HOSPITAL | Age: 58
End: 2023-10-27
Attending: INTERNAL MEDICINE
Payer: COMMERCIAL

## 2023-11-13 DIAGNOSIS — E11.9 TYPE 2 DIABETES MELLITUS WITHOUT COMPLICATION, WITHOUT LONG-TERM CURRENT USE OF INSULIN (HCC): ICD-10-CM

## 2023-11-13 DIAGNOSIS — C91.10 CLL (CHRONIC LYMPHOCYTIC LEUKEMIA) (HCC): ICD-10-CM

## 2023-11-13 RX ORDER — DAPAGLIFLOZIN 10 MG/1
TABLET, FILM COATED ORAL
Qty: 90 TABLET | Refills: 1 | Status: SHIPPED | OUTPATIENT
Start: 2023-11-13

## 2023-11-13 RX ORDER — ACYCLOVIR 400 MG/1
400 TABLET ORAL 2 TIMES DAILY
Qty: 180 TABLET | Refills: 3 | Status: SHIPPED | OUTPATIENT
Start: 2023-11-13

## 2023-11-13 NOTE — TELEPHONE ENCOUNTER
Last time medication was refilled 12/26/2022  Quantity and # of refills 90 w 1  Last OV 09/15/2023  Next OV 02/16/2024    Protocol failed     Sent to Dr. Connor Severe for approval

## 2023-11-20 ENCOUNTER — TELEPHONE (OUTPATIENT)
Dept: HEMATOLOGY/ONCOLOGY | Facility: HOSPITAL | Age: 58
End: 2023-11-20

## 2023-11-20 NOTE — TELEPHONE ENCOUNTER
Patient calling to advise  [facility] has already shipped medication  for IVIG   that is scheduled in March    Please advise

## 2023-11-23 DIAGNOSIS — I10 ESSENTIAL HYPERTENSION: ICD-10-CM

## 2023-11-23 DIAGNOSIS — E11.9 TYPE 2 DIABETES MELLITUS WITHOUT COMPLICATION, WITHOUT LONG-TERM CURRENT USE OF INSULIN (HCC): ICD-10-CM

## 2023-11-24 DIAGNOSIS — J45.40 MODERATE PERSISTENT ASTHMA WITHOUT COMPLICATION: ICD-10-CM

## 2023-11-25 ENCOUNTER — LAB ENCOUNTER (OUTPATIENT)
Dept: LAB | Age: 58
End: 2023-11-25
Attending: INTERNAL MEDICINE
Payer: COMMERCIAL

## 2023-11-25 DIAGNOSIS — E11.9 TYPE 2 DIABETES MELLITUS WITHOUT COMPLICATION, WITHOUT LONG-TERM CURRENT USE OF INSULIN (HCC): ICD-10-CM

## 2023-11-25 LAB
EST. AVERAGE GLUCOSE BLD GHB EST-MCNC: 174 MG/DL (ref 68–126)
HBA1C MFR BLD: 7.7 % (ref ?–5.7)

## 2023-11-25 PROCEDURE — 83036 HEMOGLOBIN GLYCOSYLATED A1C: CPT

## 2023-11-25 RX ORDER — BENAZEPRIL HYDROCHLORIDE 10 MG/1
TABLET ORAL
Qty: 90 TABLET | Refills: 1 | OUTPATIENT
Start: 2023-11-25

## 2023-11-25 RX ORDER — FLUTICASONE PROPIONATE 250 UG/1
1 POWDER, METERED RESPIRATORY (INHALATION) 2 TIMES DAILY
Qty: 3 EACH | Refills: 3 | Status: SHIPPED | OUTPATIENT
Start: 2023-11-25

## 2023-11-30 DIAGNOSIS — N52.9 ERECTILE DYSFUNCTION, UNSPECIFIED ERECTILE DYSFUNCTION TYPE: ICD-10-CM

## 2023-11-30 RX ORDER — SILDENAFIL 100 MG/1
TABLET, FILM COATED ORAL
Qty: 30 TABLET | Refills: 3 | Status: SHIPPED | OUTPATIENT
Start: 2023-11-30

## 2023-11-30 NOTE — TELEPHONE ENCOUNTER
Medication requested: Sildenafil Citrate 100 MG Oral Tab       Is patient requesting 30 or 90 day supply:  90    Pharmacy name/location:  [address] [place], [phone], [phone]     LOV:  09/15/2023-[place]     Is the patient due for appointment: no (if so, please schedule)

## 2023-11-30 NOTE — TELEPHONE ENCOUNTER
Last time medication was refilled 01/17/2022  Quantity and # of refills 30 w 3  Last OV 09/15/2023  Next OV 02/16/2024    Sent to Dr. Ramesh Lombardi for approval

## 2023-12-14 DIAGNOSIS — I10 ESSENTIAL HYPERTENSION: ICD-10-CM

## 2023-12-14 RX ORDER — BENAZEPRIL HYDROCHLORIDE 10 MG/1
TABLET ORAL
Qty: 90 TABLET | Refills: 1 | Status: SHIPPED | OUTPATIENT
Start: 2023-12-14

## 2023-12-14 NOTE — TELEPHONE ENCOUNTER
Last time medication was refilled 07/02/2023  Quantity and # of refills 90 w 1  Last OV 09/15/2023  Next OV 02/16/2024    Passed protocol, Rx sent.

## 2023-12-15 DIAGNOSIS — C91.10 CLL (CHRONIC LYMPHOCYTIC LEUKEMIA) (HCC): ICD-10-CM

## 2023-12-15 RX ORDER — IBRUTINIB 420 MG/1
1 TABLET, FILM COATED ORAL EVERY MORNING
Qty: 28 TABLET | Refills: 5 | Status: SHIPPED | OUTPATIENT
Start: 2023-12-15

## 2024-01-19 ENCOUNTER — TELEPHONE (OUTPATIENT)
Dept: INTERNAL MEDICINE CLINIC | Facility: CLINIC | Age: 59
End: 2024-01-19

## 2024-01-19 ENCOUNTER — OFFICE VISIT (OUTPATIENT)
Dept: HEMATOLOGY/ONCOLOGY | Facility: HOSPITAL | Age: 59
End: 2024-01-19
Attending: INTERNAL MEDICINE
Payer: COMMERCIAL

## 2024-01-19 VITALS
BODY MASS INDEX: 29 KG/M2 | SYSTOLIC BLOOD PRESSURE: 117 MMHG | RESPIRATION RATE: 16 BRPM | HEART RATE: 82 BPM | OXYGEN SATURATION: 99 % | HEIGHT: 74.02 IN | TEMPERATURE: 97 F | WEIGHT: 226 LBS | DIASTOLIC BLOOD PRESSURE: 74 MMHG

## 2024-01-19 DIAGNOSIS — D80.1 HYPOGAMMAGLOBULINEMIA (HCC): ICD-10-CM

## 2024-01-19 DIAGNOSIS — H20.9 IRITIS: ICD-10-CM

## 2024-01-19 DIAGNOSIS — N50.811 PAIN IN RIGHT TESTICLE: Primary | ICD-10-CM

## 2024-01-19 DIAGNOSIS — E11.9 TYPE 2 DIABETES MELLITUS WITHOUT COMPLICATION, WITHOUT LONG-TERM CURRENT USE OF INSULIN (HCC): ICD-10-CM

## 2024-01-19 DIAGNOSIS — Z12.5 SCREENING FOR PROSTATE CANCER: ICD-10-CM

## 2024-01-19 DIAGNOSIS — C91.10 CLL (CHRONIC LYMPHOCYTIC LEUKEMIA) (HCC): ICD-10-CM

## 2024-01-19 DIAGNOSIS — N50.89 TESTICLE SWELLING: ICD-10-CM

## 2024-01-19 DIAGNOSIS — R74.01 TRANSAMINITIS: ICD-10-CM

## 2024-01-19 DIAGNOSIS — Z00.00 LABORATORY EXAMINATION ORDERED AS PART OF A ROUTINE GENERAL MEDICAL EXAMINATION: Primary | ICD-10-CM

## 2024-01-19 DIAGNOSIS — H15.001 SCLERITIS OF RIGHT EYE: ICD-10-CM

## 2024-01-19 DIAGNOSIS — D69.6 THROMBOCYTOPENIA (HCC): ICD-10-CM

## 2024-01-19 LAB
ALBUMIN SERPL-MCNC: 4.1 G/DL (ref 3.4–5)
ALBUMIN/GLOB SERPL: 1.6 {RATIO} (ref 1–2)
ALP LIVER SERPL-CCNC: 64 U/L
ANION GAP SERPL CALC-SCNC: 5 MMOL/L (ref 0–18)
AST SERPL-CCNC: 20 U/L (ref 15–37)
BASOPHILS # BLD AUTO: 0.06 X10(3) UL (ref 0–0.2)
BASOPHILS NFR BLD AUTO: 0.8 %
BILIRUB SERPL-MCNC: 0.3 MG/DL (ref 0.1–2)
BUN BLD-MCNC: 21 MG/DL (ref 9–23)
CALCIUM BLD-MCNC: 9.3 MG/DL (ref 8.5–10.1)
CHLORIDE SERPL-SCNC: 108 MMOL/L (ref 98–112)
CO2 SERPL-SCNC: 28 MMOL/L (ref 21–32)
CREAT BLD-MCNC: 0.99 MG/DL
EGFRCR SERPLBLD CKD-EPI 2021: 88 ML/MIN/1.73M2 (ref 60–?)
EOSINOPHIL # BLD AUTO: 0.12 X10(3) UL (ref 0–0.7)
EOSINOPHIL NFR BLD AUTO: 1.6 %
ERYTHROCYTE [DISTWIDTH] IN BLOOD BY AUTOMATED COUNT: 11.9 %
FASTING STATUS PATIENT QL REPORTED: NO
GLOBULIN PLAS-MCNC: 2.5 G/DL (ref 2.8–4.4)
GLUCOSE BLD-MCNC: 139 MG/DL (ref 70–99)
HCT VFR BLD AUTO: 47.4 %
HGB BLD-MCNC: 16 G/DL
IMM GRANULOCYTES # BLD AUTO: 0.02 X10(3) UL (ref 0–1)
IMM GRANULOCYTES NFR BLD: 0.3 %
IMMUNOGLOBULIN PNL SER-MCNC: 297 MG/DL (ref 791–1643)
LDH SERPL L TO P-CCNC: 153 U/L
LYMPHOCYTES # BLD AUTO: 1.44 X10(3) UL (ref 1–4)
LYMPHOCYTES NFR BLD AUTO: 19.7 %
MCH RBC QN AUTO: 30.4 PG (ref 26–34)
MCHC RBC AUTO-ENTMCNC: 33.8 G/DL (ref 31–37)
MCV RBC AUTO: 89.9 FL
MONOCYTES # BLD AUTO: 0.49 X10(3) UL (ref 0.1–1)
MONOCYTES NFR BLD AUTO: 6.7 %
NEUTROPHILS # BLD AUTO: 5.18 X10 (3) UL (ref 1.5–7.7)
NEUTROPHILS # BLD AUTO: 5.18 X10(3) UL (ref 1.5–7.7)
NEUTROPHILS NFR BLD AUTO: 70.9 %
OSMOLALITY SERPL CALC.SUM OF ELEC: 297 MOSM/KG (ref 275–295)
PLATELET # BLD AUTO: 146 10(3)UL (ref 150–450)
POTASSIUM SERPL-SCNC: 4.6 MMOL/L (ref 3.5–5.1)
PROT SERPL-MCNC: 6.6 G/DL (ref 6.4–8.2)
RBC # BLD AUTO: 5.27 X10(6)UL
SODIUM SERPL-SCNC: 141 MMOL/L (ref 136–145)
WBC # BLD AUTO: 7.3 X10(3) UL (ref 4–11)

## 2024-01-19 PROCEDURE — 99215 OFFICE O/P EST HI 40 MIN: CPT | Performed by: INTERNAL MEDICINE

## 2024-01-19 NOTE — TELEPHONE ENCOUNTER
Last time medication was refilled 11/8/23  Quantity and # of refills 180 tab  Last OV 9/15/23  Next OV 2/16/24

## 2024-01-19 NOTE — PROGRESS NOTES
Cancer Center Progress Note    Patient Name: Deniz Aguilera   YOB: 1965   Medical Record Number: [id number]   CSN: [id number]   Attending Physician: Olivia Landeros M.D.   Referring Physician: Serafin Jack MD      Date of Visit: 1/19/2024     Chief Complaint:  Chief Complaint   Patient presents with    Follow - Up     CLL pt here for f/u. He continues on ibrutinib without difficulty. He is feeling well, energy improving, back/neck pain improved with chiropractor. Denies B symptoms. States left testicle is enlarged, denies pain.         Diagnosis:  CLL (originally diagnosed with stage II CLL at presentation with lymphocytosis with lymphadenopathy) diagnosed 7/2013     - noted increase in neck nodes and underwent excisional biopsy of a right cervical lymph node 5/27/16. Pathology revealed CLL/SLL- no evidence of transformation.     -  del(17p), TP53, IGHV mutation negative         - started Ibrutinib 11/7/18.     Presented to the ED 9/1/19 c/o \"feeling like dirt\" with fever, sore throat, chills, body aches, sinus headaches. No appetite with PO intake poor last few days. CT head showed no acute findings. Felt to have possible viral syndrome with dehydration and was sent home. He then developed diarrhea and took imodium. Has not had a BM last 2 days. Reports intermittent \"bloody boogers\" but no bleeding elsewhere.      Was [facility] by one of our APNs and was noted to have thrombocytopenia and elevated transaminases. Was admitted for further w/u and management. Took up to 7 tabs of tylenol one day- not sure of dose. No ETOH lately or illicit drug use. No known h/o hepatitis. Does have fatty liver seen on previous imaging.      Ibrutinib was held and was hydrated. Bacterial cultures and respiratory panel were negative. LDH was elevated at 1050. Viral titers were sent (EBV/HSV and CMV). EBV IgG and nuclear AG ABS positive but IgM negative. IgG levels were 179. Acute hepatitis panel was  negative. No significant schistocytes seen on smear.     He was given IVIG for hypogammaglobulinemia. CT C/A/P showed splenomegaly with decrease in known nodes. Slowly improved and was discharged home 9/7/19.     - Resumed Ibrutinib 10/4/19      History of Present Illness:  Doing ok he says. Has noticed his left testicle is swollen and tender. No urinary symptoms. Has chronic neck and back pain improved with seeing a chiropractor. Continues on Ibrutinib w/o problems. No further episodes of pancreatitis. No visual issues.  No fevers.  Chronic night sweats. Has made efforts to lose weight with cutting out carbs as did not want to be on insulin. Denies SOB or change in urinary habits. No bleeding or palpitations.       Current Medications:    Current Outpatient Medications:     Ibrutinib (IMBRUVICA) 420 MG Oral Tab, Take 1 tablet (420 mg total) by mouth every morning., Disp: 28 tablet, Rfl: 5    BENAZEPRIL HCL 10 MG Oral Tab, TAKE 1 TABLET DAILY, Disp: 90 tablet, Rfl: 1    Sildenafil Citrate 100 MG Oral Tab, TAKE 1 TABLET DAILY AS NEEDED FOR ERECTILE DYSFUNCTION. MAX DOSE 100 MG IN 24 HOURS, Disp: 30 tablet, Rfl: 3    Fluticasone Propionate, Inhal, (FLOVENT DISKUS) 250 MCG/ACT Inhalation Aerosol Powder, Breath Activated, USE 1 INHALATION TWICE A   DAY, Disp: 3 each, Rfl: 3    FARXIGA 10 MG Oral Tab, TAKE 1 TABLET DAILY, Disp: 90 tablet, Rfl: 1    acyclovir 400 MG Oral Tab, Take 1 tablet (400 mg total) by mouth 2 (two) times daily., Disp: 180 tablet, Rfl: 3    immune globulin, human, (GAMMAGARD) 20 g/200mL Injection Solution, Inject every 4 weeks, Disp: 20 g, Rfl: 11    ATORVASTATIN 40 MG Oral Tab, TAKE 1 TABLET DAILY, Disp: 90 tablet, Rfl: 3    glimepiride 4 MG Oral Tab, Take 1 tablet (4 mg total) by mouth 2 (two) times daily with meals., Disp: 180 tablet, Rfl: 1    metFORMIN 850 MG Oral Tab, Take 1 tablet (850 mg total) by mouth 2 (two) times daily., Disp: 180 tablet, Rfl: 1    fluconazole 100 MG Oral Tab, Take 1  tablet (100 mg total) by mouth daily., Disp: 30 tablet, Rfl: 5    B Complex-C-Folic Acid (VITALINE BIOTIN FORTE) 0.8 MG Oral Tab, Take by mouth., Disp: , Rfl:     Prochlorperazine Maleate (COMPAZINE) 10 mg tablet, TAKE 1 TABLET BY MOUTH EVERY SIX HOURS AS NEEDED FOR NAUSEA, Disp: 30 tablet, Rfl: 3    Cyanocobalamin (VITAMIN B 12 OR), Take by mouth., Disp: , Rfl:     aspirin 81 MG Oral Tab, Take 1 tablet (81 mg total) by mouth daily., Disp: , Rfl: 0    Fexofenadine HCl (WAL-FEX ALLERGY) 180 MG Oral Tab, Take 1 tablet by mouth once daily., Disp: 90 tablet, Rfl: 1    Multiple Vitamins-Minerals (CENTRUM SILVER ULTRA MENS) Oral Tab, Take  by mouth., Disp: , Rfl:     PEG 3350-KCl-NaBcb-NaCl-NaSulf (PEG 3350/ELECTROLYTES) 240 g Oral Recon Soln, Take as directed by Doctor (Patient not taking: Reported on 1/19/2024), Disp: 4000 mL, Rfl: 0    Past Medical History:  Past Medical History:   Diagnosis Date    Back pain     Blood in urine     Cancer (HCC) 2013    CLL-monitored by Dr. Landeros    Decorative tattoo     Diarrhea, unspecified     Essential hypertension, benign 11/21/2014    Controlled     Extrinsic asthma, unspecified     High cholesterol     Kidney stone     Laboratory examination ordered as part of a routine general medical examination     Type II or unspecified type diabetes mellitus without mention of complication, not stated as uncontrolled     Unspecified essential hypertension     Visual impairment     glasses    Wears glasses        Past Surgical History:  Past Surgical History:   Procedure Laterality Date    COLONOSCOPY      COLONOSCOPY      OTHER  05/27/2016    cervical lad       Family Medical History:  Family History   Problem Relation Age of Onset    Other (CHF) Maternal Grandmother     Other (Acute Myocardial Infarction) Paternal Grandfather     Asthma Mother     Diabetes Paternal Grandmother        Psychosocial History:  Social History     Socioeconomic History    Marital status:      Spouse  name: Not on file    Number of children: Not on file    Years of education: Not on file    Highest education level: Not on file   Occupational History    Not on file   Tobacco Use    Smoking status: Every Day     Types: Pipe    Smokeless tobacco: Never    Tobacco comments:     pipes and cigars   Vaping Use    Vaping Use: Never used   Substance and Sexual Activity    Alcohol use: Yes     Alcohol/week: 4.0 - 6.0 standard drinks of alcohol     Types: 4 - 6 Cans of beer per week     Comment: 4-6 Beers/week    Drug use: No    Sexual activity: Not on file   Other Topics Concern     Service Not Asked    Blood Transfusions Not Asked    Caffeine Concern Yes     Comment: 32-40 oz per day/coffee/tea    Occupational Exposure Not Asked    Hobby Hazards Not Asked    Sleep Concern Not Asked    Stress Concern Not Asked    Weight Concern Not Asked    Special Diet Not Asked    Back Care Not Asked    Exercise No    Bike Helmet Not Asked    Seat Belt Not Asked    Self-Exams Not Asked   Social History Narrative    Not on file     Social Determinants of Health     Financial Resource Strain: Not on file   Food Insecurity: Not on file   Transportation Needs: Not on file   Physical Activity: Not on file   Stress: Not on file   Social Connections: Not on file   Housing Stability: Not on file         Allergies:  Allergies   Allergen Reactions    Codeine OTHER (SEE COMMENTS)     nausea    Dust SHORTNESS OF BREATH    Mold SHORTNESS OF BREATH    Pollen SHORTNESS OF BREATH        Review of Systems:  A 14-point ROS was done with pertinent positives and negative per the HPI    Vital Signs:  /74 (BP Location: Left arm, Patient Position: Sitting, Cuff Size: adult)   Pulse 82   Temp 97.1 °F (36.2 °C) (Temporal)   Resp 16   Ht 1.88 m (6' 2.02\")   Wt 102.5 kg (226 lb)   SpO2 99%   BMI 29.00 kg/m²     Physical Examination:  General: Patient is alert and oriented x 3, not in acute distress.  Psych:  Mood and affect appropriate  HEENT:  EOMs intact. PERRL. Oropharynx is clear. Neck: No JVD. No significant adenopathy.   Neck is supple.  Chest: Clear to auscultation.  Heart: Regular rate and rhythm.   Abdomen: Soft, non tender with good bowel sounds.  No hepatosplenomegaly. No palpable mass.  Extremities: Pedal pulses are present. No BLE edema. Some with bruising and scabbing from scratches. LUE- lower arm scratch with scabbing, erythema and some swelling  Neurological: Grossly intact.   Declined  exam        Laboratory:  Lab Results   Component Value Date    WBC 7.3 01/19/2024    RBC 5.27 01/19/2024    HGB 16.0 01/19/2024    HCT 47.4 01/19/2024    .0 (L) 01/19/2024    MCV 89.9 01/19/2024    MCH 30.4 01/19/2024    MCHC 33.8 01/19/2024    RDW 11.9 01/19/2024    NEPRELIM 5.18 01/19/2024    NEUTABS 4.91 09/07/2019    LYMPHABS 2.42 09/07/2019    EOSABS 0.00 09/07/2019    BASABS 0.00 09/07/2019    NEUT 60 09/07/2019    LYMPH 31 09/07/2019    MON 6 09/07/2019    EOS 0 09/07/2019    BASO 0 09/07/2019    NEPERCENT 70.9 01/19/2024    LYPERCENT 19.7 01/19/2024    MOPERCENT 6.7 01/19/2024    EOPERCENT 1.6 01/19/2024    BAPERCENT 0.8 01/19/2024    NE 5.18 01/19/2024    LYMABS 1.44 01/19/2024    MOABSO 0.49 01/19/2024    EOABSO 0.12 01/19/2024    BAABSO 0.06 01/19/2024     Lab Results   Component Value Date     (H) 01/19/2024    BUN 21 01/19/2024    BUNCREA 15.4 06/11/2021    CREATSERUM 0.99 01/19/2024    ANIONGAP 5 01/19/2024    GFR 98 01/11/2018    GFRNAA 95 07/22/2022    GFRAA 110 07/22/2022    CA 9.3 01/19/2024    OSMOCALC 297 (H) 01/19/2024    ALKPHO 64 01/19/2024    AST 20 01/19/2024    ALT  01/19/2024      Comment:      Due to  backorder we are temporarily unable to offer hospital-based ALT testing at [facility].   If urgently needed, please order ALT test code [id number].   The new order will need a new venipuncture and will be sent to [facility] for testing.   The expected turnaround time will be within 24 hours.     BILT 0.3  01/19/2024    TP 6.6 01/19/2024    ALB 4.1 01/19/2024    GLOBULIN 2.5 (L) 01/19/2024    AGRATIO 2.7 (H) 06/29/2013     01/19/2024    K 4.6 01/19/2024     01/19/2024    CO2 28.0 01/19/2024     Lab Component   Component Value Date/Time     01/19/2024 1249     08/04/2014 1537      Latest Reference Range & Units 10/20/23 07:55 01/19/24 12:49   IMMUNOGLOBULIN G 791 - 1,643 mg/dL 300 (L) 297 (L)   (L): Data is abnormally low        Radiology:  PROCEDURE:  US ABDOMEN COMPLETE (CPT=76700)     COMPARISON:  None.     INDICATIONS:  abnormal labs     TECHNIQUE:  Real time gray-scale ultrasound was used to evaluate the abdomen.  The exam includes images of the liver, gallbladder, common bile duct, pancreas, spleen, kidneys, IVC, and aorta.     PATIENT STATED HISTORY: (As transcribed by Technologist)           FINDINGS:    LIVER:  There is mild increased echogenicity of the liver which may represent fatty infiltration or hepatocellular disease.  Please note that this limits sensitivity for a focal hepatic lesion.  BILIARY:  No gallstones or pericholecystic fluid is seen.  No gallbladder wall thickening.  Low level echogenicity within the gallbladder likely represents sludge.  Common bile duct diameter is 5 mm.  PANCREAS:  The pancreas is not well seen due to overlying bowel gas.  SPLEEN:  Spleen measures up to 13 cm in length.  KIDNEYS:  Right kidney measures 12 cm.  Left kidney measures 11.9 cm.  AORTA/IVC:  Normal.                   Impression   CONCLUSION:  There is mild increased echogenicity of the liver which may represent fatty infiltration or hepatocellular disease.  Please note that this limits sensitivity for a focal hepatic lesion.     Gallbladder sludge is present.  No evidence of pericholecystic fluid or gallbladder wall thickening.        LOCATION:  [id number]           Dictated by (CST): Hitesh Aburto MD on 9/14/2023 at 6:47 PM      Finalized by (CST): Hitesh Aburto MD on 9/14/2023 at 6:50 PM          Narrative   PROCEDURE:  XR LUMBAR SPINE (MIN 2 VIEWS) (CPT=72100)     LOCATION:  [facility]       TECHNIQUE:  AP, lateral, and coned down L5-S1 views were obtained.     COMPARISON:  None.     INDICATIONS:  M54.40 Low back pain with sciatica, sciatica laterality unspecified, unspecified back pain laterality, unspecified chronicity     PATIENT STATED HISTORY: (As transcribed by Technologist)  Patient is having an orthopedic evaluation.  Patient stated he has a slap lesion in his Right shoulder.  He stated it started hurting 1 week ago.  Patient is also having sever low back pain.           FINDINGS:    Trace right convex curvature of the lumbar spine.  Moderate facet arthropathy is identified at L4-5 and L5-S1.  Extensive atherosclerosis.  Mild to moderate endplate degenerative changes.  The vertebral body heights and disc heights are preserved.  No  acute displaced osseous fracture.                   Impression   CONCLUSION:  Mild to moderate degenerative changes in the lumbar spine.        Dictated by (CST): Stromberg, LeRoy, MD on 1/12/2023 at 12:55 PM      Finalized by (CST): Stromberg, LeRoy, MD on 1/12/2023 at 12:57 PM       PROCEDURE:  CT CHEST+ABDOMEN+PELVIS(ALL CNTRST ONLY)(CPT=71260/88725)     COMPARISON:  [facility] , CT CHEST+ABDOMEN+PELVIS(ALL CNTRST ONLY)(CPT=71260/34539), 6/24/2016, 7:40.  [facility], CT ABDOMEN+PELVIS(ALL W+WO)(CPT=74178), 5/04/2019, 7:06.     INDICATIONS:  CLL, elevated transaminases, fever     TECHNIQUE:  IV contrast-enhanced scanning through the chest, abdomen, and pelvis was performed.  Dose reduction techniques were used. Dose information is transmitted to the ACR (American College of Radiology) NRDR (National Radiology Data Registry) which   includes the Dose Index Registry.     PATIENT STATED HISTORY:(As transcribed by Technologist)  Inpatient.  Patient presents with fever, bodyache and back pain.  Hx CLL      CONTRAST USED:  100cc of Omnipaque 350     FINDINGS:    CHEST:        LUNGS/PLEURA:  New bilateral posterior lower lobe atelectasis, greater on the left, without pleural effusion.  Stable 5 mm right lower lobe pulmonary nodule, no change since June 2016, more than 3 years, benign     THORACIC AORTA:  No aneurysm or other acute process     MEDIASTINUM/AURA:  Stable right hilar lymph node 17 x 18 mm size..     CARDIAC:  Unremarkable.     CHEST WALL:  Unremarkable.     OTHER:  Bilateral axillary lymph nodes have decreased considerably in size, with the largest lymph node now measuring 9 mm in greatest short axis dimension, image 48 in the left axilla, all other lymph nodes are smaller, with previous size measurements   up to 3.4 cm on the right, and up to 2.4 cm on the left..     ABDOMEN/PELVIS:     LIVER:  Unremarkable.      BILIARY:  Unremarkable.     PANCREAS:  Unremarkable.     SPLEEN:  The spleen has increased in size since the prior CT scan from May 2019, 17.2 cm cephalocaudal dimension, previously 13.1 cm.  It measures  15.3 cm in AP dimension, previously 13.3 cm.  Splenic attenuation is homogeneous.      KIDNEYS:  No acute abnormality.     ADRENALS:  Unremarkable.     AORTA/VASCULAR:  No aortic aneurysm.      RETROPERITONEUM:  A previously measured 12 x 12 mm left para-aortic retroperitoneal lymph node has decreased in size, now measuring about 8-9 mm.  Other retroperitoneal lymph nodes are sub cm, none showing any increase in size.  There are numerous   mesenteric lymph nodes as well subcentimeter, and stable when measured in short axis dimension. .     BOWEL/MESENTERY:  No acute process. No evidence for small bowel obstruction, with administered oral contrast passing through the small bowel, and opacifying the colon by the time of the study. No sign of dilation of the small bowel.  No ascites, free   air, colitis, diverticulitis or excessive stool.  Appendix is visualized, normal.        ABDOMINAL WALL:  Unremarkable.     URINARY BLADDER:  Unremarkable.      PELVIC NODES:   Unremarkable.     PELVIC ORGANS:  No acute process.      OTHER:  None.     SKELETAL STRUCTURES IN THE CHEST/ABDOMEN/PELVIS:     BONES:  No acute abnormality.         =====  CONCLUSION:       1. Development of splenomegaly, with obvious change in the volume of the spleen when compared with a relatively recent CT scan of the abdomen from May 2019. Measurements are given above, including cephalocaudal dimension of 17.2 cm, previously 13.1 cm.    Enhancement of the spleen is homogeneous without sign of mass, or nodule, and there is no perisplenic fluid.     2. Decrease in the size of previously clearly enlarged axillary lymph nodes, now normal size, the largest measuring 9 mm left axilla.  Stable right hilar lymph node mildly enlarged.     3. Decrease in the size of retroperitoneal lymph node.  Numerous subcentimeter stable appearing retroperitoneal and mesenteric lymph nodes.     4. No bowel obstruction, ascites, free air, abscess, colitis, diverticulitis or other acute process.          Dictated by: Jacek Manzano MD on 9/06/2019 at 9:23       Approved by: Jacek Manzano MD             Impression and Plan:  1. CLL- on Ibrutinib and tolerating ok. Manageable side effects. WBC/lymphs, Hgb normal!  Platelets within his range. Levels acceptable and no bleeding. Continued monitoring for AEs- no bleeding, severe infections or arrhythmias. LDH normal.     2. Increased creatinine- Has been normal lately    3. Elevated LFTs-   had bout of pancreatitis from alcohol. Advised moderation if not stop altogether. He says what happened was not typical for him but was at school reunion. LFTs have been good lately    4. Hypogammaglobulinemia- was on monthly IVIG and tolerated this well. As has not had recent severe infections for some time started spaced out infusions to Q 8-> 12 weeks--> 6 months.  He is aware of signs and symptoms to watch out for.     5. Back pain/sciatica- followed by ortho and now chiropractor. Previous xrays  show degenerative change..     6. Pancreatitis- see (3). Last lipase was done to 99 (from 693) and symptoms have resolved. F/u with GI with additional w/u planned    7. 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 I discussed this with him at length. I told him to let me know if he has another episode. He has not had any recent flares and last episode resolved quickly on steroid eye drops. Work-up for possible  autoimmune disorders and other potential etiology negative. Continues to be followed by ophth.     8. Swollen left testicle with pain with palpation- declined  exam today. US ordered.     Labs reviewed with him    Continue Ibrutinib,  prophylactic antibiotics and IVIG    RTC 3 months. IVIG Q 6 months    Risk level high- CLL on targeted therapy and IVIG    Emotional Well Being:  I have assessed the patient's emotional well-being and any concerns about anxiety or depression.  We discussed issues of distress, coping difficulties and social support systems and currently there are no active problems.      MD [facility]

## 2024-01-19 NOTE — TELEPHONE ENCOUNTER
Preferred Lab: [facility]    LOV: 09/15/2023-[place]   Next OV & Reason:  02/16/2024-Physical    Patient was notified to fast 8-10 hours drinking only water/black coffee prior to having labs drawn and may need to submit urine sample.  Hemoglobin A1C will be ordered if patient has diabetes or prediabetes.  Lab orders will be placed by end of day:  yes  Patient requesting A1C: yes  Patient informed insurance may not cover A1C and they may be responsible for cost if denied by insurance:  yes  Patient requesting return call:  no

## 2024-01-26 ENCOUNTER — HOSPITAL ENCOUNTER (OUTPATIENT)
Dept: ULTRASOUND IMAGING | Age: 59
Discharge: HOME | End: 2024-01-26
Attending: INTERNAL MEDICINE
Payer: COMMERCIAL

## 2024-01-26 DIAGNOSIS — N50.811 PAIN IN RIGHT TESTICLE: ICD-10-CM

## 2024-01-26 DIAGNOSIS — N50.89 TESTICLE SWELLING: ICD-10-CM

## 2024-01-26 PROCEDURE — 76870 US EXAM SCROTUM: CPT | Performed by: INTERNAL MEDICINE

## 2024-01-26 PROCEDURE — 93975 VASCULAR STUDY: CPT | Performed by: INTERNAL MEDICINE

## 2024-02-03 ENCOUNTER — LAB ENCOUNTER (OUTPATIENT)
Dept: LAB | Age: 59
End: 2024-02-03
Attending: INTERNAL MEDICINE
Payer: COMMERCIAL

## 2024-02-03 DIAGNOSIS — Z00.00 LABORATORY EXAMINATION ORDERED AS PART OF A ROUTINE GENERAL MEDICAL EXAMINATION: ICD-10-CM

## 2024-02-03 DIAGNOSIS — Z12.5 SCREENING FOR PROSTATE CANCER: ICD-10-CM

## 2024-02-03 DIAGNOSIS — E11.9 TYPE 2 DIABETES MELLITUS WITHOUT COMPLICATION, WITHOUT LONG-TERM CURRENT USE OF INSULIN (HCC): ICD-10-CM

## 2024-02-03 LAB
BILIRUB UR QL STRIP.AUTO: NEGATIVE
CHOLEST SERPL-MCNC: 136 MG/DL (ref ?–200)
CLARITY UR REFRACT.AUTO: CLEAR
COMPLEXED PSA SERPL-MCNC: 0.52 NG/ML (ref ?–4)
CREAT UR-SCNC: 112 MG/DL
EST. AVERAGE GLUCOSE BLD GHB EST-MCNC: 174 MG/DL (ref 68–126)
FASTING PATIENT LIPID ANSWER: YES
GLUCOSE UR STRIP.AUTO-MCNC: >1000 MG/DL
HBA1C MFR BLD: 7.7 % (ref ?–5.7)
HDLC SERPL-MCNC: 46 MG/DL (ref 40–59)
LDLC SERPL CALC-MCNC: 65 MG/DL (ref ?–100)
LEUKOCYTE ESTERASE UR QL STRIP.AUTO: NEGATIVE
MICROALBUMIN UR-MCNC: 3.79 MG/DL
MICROALBUMIN/CREAT 24H UR-RTO: 33.8 UG/MG (ref ?–30)
NITRITE UR QL STRIP.AUTO: NEGATIVE
NONHDLC SERPL-MCNC: 90 MG/DL (ref ?–130)
PH UR STRIP.AUTO: 5.5 [PH] (ref 5–8)
PROT UR STRIP.AUTO-MCNC: NEGATIVE MG/DL
SP GR UR STRIP.AUTO: >1.03 (ref 1–1.03)
TRIGL SERPL-MCNC: 142 MG/DL (ref 30–149)
TSI SER-ACNC: 3.13 MIU/ML (ref 0.36–3.74)
UROBILINOGEN UR STRIP.AUTO-MCNC: NORMAL MG/DL
VLDLC SERPL CALC-MCNC: 21 MG/DL (ref 0–30)

## 2024-02-03 PROCEDURE — 3060F POS MICROALBUMINURIA REV: CPT | Performed by: INTERNAL MEDICINE

## 2024-02-03 PROCEDURE — 3051F HG A1C>EQUAL 7.0%<8.0%: CPT | Performed by: INTERNAL MEDICINE

## 2024-02-03 PROCEDURE — 3061F NEG MICROALBUMINURIA REV: CPT | Performed by: INTERNAL MEDICINE

## 2024-02-03 PROCEDURE — 83036 HEMOGLOBIN GLYCOSYLATED A1C: CPT

## 2024-02-03 PROCEDURE — 84443 ASSAY THYROID STIM HORMONE: CPT

## 2024-02-03 PROCEDURE — 82043 UR ALBUMIN QUANTITATIVE: CPT

## 2024-02-03 PROCEDURE — 81001 URINALYSIS AUTO W/SCOPE: CPT

## 2024-02-03 PROCEDURE — 80061 LIPID PANEL: CPT

## 2024-02-03 PROCEDURE — 82570 ASSAY OF URINE CREATININE: CPT

## 2024-02-11 DIAGNOSIS — C91.10 CLL (CHRONIC LYMPHOCYTIC LEUKEMIA) (HCC): ICD-10-CM

## 2024-02-12 RX ORDER — FLUCONAZOLE 100 MG/1
100 TABLET ORAL DAILY
Qty: 30 TABLET | Refills: 5 | Status: SHIPPED | OUTPATIENT
Start: 2024-02-12

## 2024-02-15 ENCOUNTER — TELEPHONE (OUTPATIENT)
Dept: INTERNAL MEDICINE CLINIC | Facility: CLINIC | Age: 59
End: 2024-02-15

## 2024-02-15 DIAGNOSIS — Z12.11 SPECIAL SCREENING FOR MALIGNANT NEOPLASMS, COLON: Primary | ICD-10-CM

## 2024-02-15 NOTE — TELEPHONE ENCOUNTER
Called pt and left vm informing pt a new FIT card has been placed and will be mailed to his address.

## 2024-02-25 ENCOUNTER — APPOINTMENT (OUTPATIENT)
Dept: LAB | Age: 59
End: 2024-02-25
Attending: INTERNAL MEDICINE
Payer: COMMERCIAL

## 2024-03-04 ENCOUNTER — TELEPHONE (OUTPATIENT)
Dept: INTERNAL MEDICINE CLINIC | Facility: CLINIC | Age: 59
End: 2024-03-04

## 2024-03-04 DIAGNOSIS — Z12.11 COLON CANCER SCREENING: Primary | ICD-10-CM

## 2024-03-04 NOTE — TELEPHONE ENCOUNTER
[facility] would like to speak with a nurse - they would like to verify lab orders

## 2024-03-04 NOTE — TELEPHONE ENCOUNTER
Spoke with Toan at [facility] lab  Needed stool card orders for blue card  Orders placed

## 2024-03-08 ENCOUNTER — OFFICE VISIT (OUTPATIENT)
Dept: INTERNAL MEDICINE CLINIC | Facility: CLINIC | Age: 59
End: 2024-03-08
Payer: COMMERCIAL

## 2024-03-08 VITALS
RESPIRATION RATE: 14 BRPM | HEIGHT: 74 IN | TEMPERATURE: 98 F | WEIGHT: 230 LBS | BODY MASS INDEX: 29.52 KG/M2 | DIASTOLIC BLOOD PRESSURE: 70 MMHG | SYSTOLIC BLOOD PRESSURE: 124 MMHG | OXYGEN SATURATION: 97 % | HEART RATE: 86 BPM

## 2024-03-08 DIAGNOSIS — Z00.00 ANNUAL PHYSICAL EXAM: Primary | ICD-10-CM

## 2024-03-08 PROBLEM — R79.89 ELEVATED LFTS: Status: RESOLVED | Noted: 2023-09-14 | Resolved: 2024-03-08

## 2024-03-08 PROBLEM — E11.8 DIABETES MELLITUS WITH COMPLICATION (HCC): Status: ACTIVE | Noted: 2024-03-08

## 2024-03-08 PROCEDURE — 3008F BODY MASS INDEX DOCD: CPT | Performed by: INTERNAL MEDICINE

## 2024-03-08 PROCEDURE — 3078F DIAST BP <80 MM HG: CPT | Performed by: INTERNAL MEDICINE

## 2024-03-08 PROCEDURE — 99396 PREV VISIT EST AGE 40-64: CPT | Performed by: INTERNAL MEDICINE

## 2024-03-08 PROCEDURE — 3074F SYST BP LT 130 MM HG: CPT | Performed by: INTERNAL MEDICINE

## 2024-03-08 NOTE — PROGRESS NOTES
Subjective:   Patient ID: Deniz Aguilera is a 58 year old male.    HPI  Deniz Aguilera is a 58 year old male who presents for a complete physical exam.   HPI:   Pt complains of nothing  Normal state of health  No hypoglycemia  Tolerating meds      PAST MEDICAL, SOCIAL, FAMILY HISTORIES REVIEWED WITH PT  .    Immunization History   Administered Date(s) Administered    Covid-19 Vaccine Moderna 100 mcg/0.5 ml 03/15/2021, 04/12/2021, 08/19/2021    Covid-19 Vaccine Moderna 50 Mcg/0.25 Ml 06/18/2022    Covid-19 Vaccine Pfizer Bivalent 30mcg/0.3mL 02/10/2023, 06/30/2023    FLUZONE 6 months and older PFS 0.5 ml (58505) 10/06/2015, 12/09/2016, 10/12/2017, 10/18/2018    Fluarix 6 Months And Older 0.5 ml prefilled syringe (28322) 09/11/2020    Flublok Quad Influenza Vaccine (43693) 10/01/2021, 10/21/2023    Influenza 12/08/2012, 09/26/2018, 09/10/2019, 12/15/2022, 10/13/2023    Pfizer Covid-19 Vaccine 30mcg/0.3ml 12yrs+ ([id number]) 10/21/2023    Pneumococcal (Prevnar 13) 11/07/2019    Pneumococcal Conjugate PCV20 07/28/2022    Pneumovax 23 12/09/2016    TDAP 04/06/2015    Zoster Vaccine Recombinant Adjuvanted (Shingrix) 07/03/2018, 09/21/2018     Wt Readings from Last 6 Encounters:   03/08/24 230 lb (104.3 kg)   01/19/24 226 lb (102.5 kg)   10/20/23 225 lb (102.1 kg)   09/21/23 224 lb (101.6 kg)   09/15/23 224 lb (101.6 kg)   09/14/23 222 lb 10.6 oz (101 kg)     Body mass index is 29.53 kg/m².     Lab Results   Component Value Date     (H) 01/19/2024     (H) 10/20/2023     (H) 09/15/2023     Lab Results   Component Value Date    CHOLEST 136 02/03/2024    CHOLEST 122 07/28/2023    CHOLEST 112 06/04/2022     Lab Results   Component Value Date    HDL 46 02/03/2024    HDL 41 07/28/2023    HDL 37 (L) 06/04/2022     Lab Results   Component Value Date    LDL 65 02/03/2024    LDL 48 07/28/2023    LDL 48 06/04/2022     Lab Results   Component Value Date    AST 20 01/19/2024    AST 21 10/20/2023    AST 30 09/22/2023      Lab Results   Component Value Date    ALT  01/19/2024      Comment:      Due to  backorder we are temporarily unable to offer hospital-based ALT testing at [facility].   If urgently needed, please order ALT test code [id number].   The new order will need a new venipuncture and will be sent to [facility] for testing.   The expected turnaround time will be within 24 hours.     ALT 35 10/20/2023    ALT 54 09/22/2023     Lab Results   Component Value Date    PSA 0.33 04/07/2018    PSA 0.549 10/08/2016    PSA 0.466 10/03/2015        Current Outpatient Medications   Medication Sig Dispense Refill    fluconazole 100 MG Oral Tab Take 1 tablet (100 mg total) by mouth daily. 30 tablet 5    metFORMIN 850 MG Oral Tab Take 1 tablet (850 mg total) by mouth 2 (two) times daily. 180 tablet 0    Ibrutinib (IMBRUVICA) 420 MG Oral Tab Take 1 tablet (420 mg total) by mouth every morning. 28 tablet 5    BENAZEPRIL HCL 10 MG Oral Tab TAKE 1 TABLET DAILY 90 tablet 1    Sildenafil Citrate 100 MG Oral Tab TAKE 1 TABLET DAILY AS NEEDED FOR ERECTILE DYSFUNCTION. MAX DOSE 100 MG IN 24 HOURS 30 tablet 3    Fluticasone Propionate, Inhal, (FLOVENT DISKUS) 250 MCG/ACT Inhalation Aerosol Powder, Breath Activated USE 1 INHALATION TWICE A   DAY 3 each 3    FARXIGA 10 MG Oral Tab TAKE 1 TABLET DAILY 90 tablet 1    acyclovir 400 MG Oral Tab Take 1 tablet (400 mg total) by mouth 2 (two) times daily. 180 tablet 3    immune globulin, human, (GAMMAGARD) 20 g/200mL Injection Solution Inject every 4 weeks 20 g 11    ATORVASTATIN 40 MG Oral Tab TAKE 1 TABLET DAILY 90 tablet 3    glimepiride 4 MG Oral Tab Take 1 tablet (4 mg total) by mouth 2 (two) times daily with meals. 180 tablet 1    PEG 3350-KCl-NaBcb-NaCl-NaSulf (PEG 3350/ELECTROLYTES) 240 g Oral Recon Soln Take as directed by Doctor 4000 mL 0    B Complex-C-Folic Acid (VITALINE BIOTIN FORTE) 0.8 MG Oral Tab Take by mouth.      Prochlorperazine Maleate (COMPAZINE) 10 mg tablet TAKE 1  TABLET BY MOUTH EVERY SIX HOURS AS NEEDED FOR NAUSEA 30 tablet 3    Cyanocobalamin (VITAMIN B 12 OR) Take by mouth.      aspirin 81 MG Oral Tab Take 1 tablet (81 mg total) by mouth daily.  0    Fexofenadine HCl (WAL-FEX ALLERGY) 180 MG Oral Tab Take 1 tablet by mouth once daily. 90 tablet 1    Multiple Vitamins-Minerals (CENTRUM SILVER ULTRA MENS) Oral Tab Take  by mouth.        Past Medical History:   Diagnosis Date    Back pain     Blood in urine     Cancer (HCC) 2013    CLL-monitored by Dr. Leti Gaitan tattoo     Diarrhea, unspecified     Essential hypertension, benign 11/21/2014    Controlled     Extrinsic asthma, unspecified     High cholesterol     Kidney stone     Laboratory examination ordered as part of a routine general medical examination     Type II or unspecified type diabetes mellitus without mention of complication, not stated as uncontrolled     Unspecified essential hypertension     Visual impairment     glasses    Wears glasses       Past Surgical History:   Procedure Laterality Date    COLONOSCOPY      COLONOSCOPY      OTHER  05/27/2016    cervical lad      Family History   Problem Relation Age of Onset    Other (CHF) Maternal Grandmother     Other (Acute Myocardial Infarction) Paternal Grandfather     Asthma Mother     Diabetes Paternal Grandmother       Social History:  Social History     Socioeconomic History    Marital status:    Tobacco Use    Smoking status: Every Day     Types: Pipe    Smokeless tobacco: Never    Tobacco comments:     pipes and cigars   Vaping Use    Vaping Use: Never used   Substance and Sexual Activity    Alcohol use: Yes     Alcohol/week: 4.0 - 6.0 standard drinks of alcohol     Types: 4 - 6 Cans of beer per week     Comment: 3-4  Beers/week    Drug use: No   Other Topics Concern    Caffeine Concern Yes     Comment: 32-40 oz per day/coffee/tea    Exercise No      Occ: yes. : yes. Children: yes.   Exercise:  twice per week, three times per week.   Diet: watches fats closely     REVIEW OF SYSTEMS:   A comprehensive 10 point review of systems was completed.     Pertinent positives and negatives noted in the HPI.      EXAM:   /70   Pulse 86   Temp 97.8 °F (36.6 °C)   Resp 14   Ht 6' 2\" (1.88 m)   Wt 230 lb (104.3 kg)   SpO2 97%   BMI 29.53 kg/m²   Body mass index is 29.53 kg/m².   GENERAL: well developed, well nourished,in no apparent distress  SKIN: no rashes,no suspicious lesions  HEENT: atraumatic, normocephalic,ears and throat are clear  EYES:PERRLA, EOMI, ,conjunctiva are clear  NECK: supple,no adenopathy,no bruits  LUNGS: clear to auscultation  CARDIO: RRR without murmur  GI: good BS's,no masses, HSM or tenderness  : deferred  MUSCULOSKELETAL: back is not tender  EXTREMITIES: no cyanosis, clubbing or edema  NEURO: Oriented times three, motor and sensory are grossly intact, Bilateral barefoot skin diabetic exam is normal, visualized feet and the appearance is normal.  Bilateral monofilament/sensation of both feet is normal.  Pulsation pedal pulse exam of both lower legs/feet is normal as well.        ASSESSMENT AND PLAN:   Deniz Aguilera is a 58 year old male who presents for a complete physical exam.  Body mass index is 29.53 kg/m²., recommended low fat diet and aerobic exercise 30 minutes three times weekly.   Health maintenance, will check fasting Lipids, CMP, CBC and PSA.   \UTD with screening colonoscopy.   Pt info handouts given for: exercise, low fat diet,   The patient indicates understanding of these issues and agrees to the plan.  The patient is asked to return for CPX in 12 m.    History/Other:   Review of Systems  Current Outpatient Medications   Medication Sig Dispense Refill    fluconazole 100 MG Oral Tab Take 1 tablet (100 mg total) by mouth daily. 30 tablet 5    metFORMIN 850 MG Oral Tab Take 1 tablet (850 mg total) by mouth 2 (two) times daily. 180 tablet 0    Ibrutinib (IMBRUVICA) 420 MG Oral Tab Take 1 tablet (420 mg total)  by mouth every morning. 28 tablet 5    BENAZEPRIL HCL 10 MG Oral Tab TAKE 1 TABLET DAILY 90 tablet 1    Sildenafil Citrate 100 MG Oral Tab TAKE 1 TABLET DAILY AS NEEDED FOR ERECTILE DYSFUNCTION. MAX DOSE 100 MG IN 24 HOURS 30 tablet 3    Fluticasone Propionate, Inhal, (FLOVENT DISKUS) 250 MCG/ACT Inhalation Aerosol Powder, Breath Activated USE 1 INHALATION TWICE A   DAY 3 each 3    FARXIGA 10 MG Oral Tab TAKE 1 TABLET DAILY 90 tablet 1    acyclovir 400 MG Oral Tab Take 1 tablet (400 mg total) by mouth 2 (two) times daily. 180 tablet 3    immune globulin, human, (GAMMAGARD) 20 g/200mL Injection Solution Inject every 4 weeks 20 g 11    ATORVASTATIN 40 MG Oral Tab TAKE 1 TABLET DAILY 90 tablet 3    glimepiride 4 MG Oral Tab Take 1 tablet (4 mg total) by mouth 2 (two) times daily with meals. 180 tablet 1    PEG 3350-KCl-NaBcb-NaCl-NaSulf (PEG 3350/ELECTROLYTES) 240 g Oral Recon Soln Take as directed by Doctor 4000 mL 0    B Complex-C-Folic Acid (VITALINE BIOTIN FORTE) 0.8 MG Oral Tab Take by mouth.      Prochlorperazine Maleate (COMPAZINE) 10 mg tablet TAKE 1 TABLET BY MOUTH EVERY SIX HOURS AS NEEDED FOR NAUSEA 30 tablet 3    Cyanocobalamin (VITAMIN B 12 OR) Take by mouth.      aspirin 81 MG Oral Tab Take 1 tablet (81 mg total) by mouth daily.  0    Fexofenadine HCl (WAL-FEX ALLERGY) 180 MG Oral Tab Take 1 tablet by mouth once daily. 90 tablet 1    Multiple Vitamins-Minerals (CENTRUM SILVER ULTRA MENS) Oral Tab Take  by mouth.       Allergies:  Allergies   Allergen Reactions    Codeine OTHER (SEE COMMENTS)     nausea    Dust SHORTNESS OF BREATH    Mold SHORTNESS OF BREATH    Pollen SHORTNESS OF BREATH       Objective:   Physical Exam    Assessment & Plan:   1. Annual physical exam        No orders of the defined types were placed in this encounter.      Meds This Visit:  Requested Prescriptions      No prescriptions requested or ordered in this encounter       Imaging & Referrals:  None

## 2024-03-24 DIAGNOSIS — E11.9 TYPE 2 DIABETES MELLITUS WITHOUT COMPLICATION, WITHOUT LONG-TERM CURRENT USE OF INSULIN (HCC): ICD-10-CM

## 2024-03-24 RX ORDER — GLIMEPIRIDE 4 MG/1
4 TABLET ORAL 2 TIMES DAILY WITH MEALS
Qty: 180 TABLET | Refills: 1 | Status: SHIPPED | OUTPATIENT
Start: 2024-03-24

## 2024-04-01 DIAGNOSIS — C91.10 CLL (CHRONIC LYMPHOCYTIC LEUKEMIA) (HCC): ICD-10-CM

## 2024-04-01 RX ORDER — ONDANSETRON HYDROCHLORIDE 8 MG/1
8 TABLET, FILM COATED ORAL EVERY 8 HOURS PRN
Qty: 30 TABLET | Refills: 3 | Status: SHIPPED | OUTPATIENT
Start: 2024-04-01

## 2024-04-10 DIAGNOSIS — E11.9 TYPE 2 DIABETES MELLITUS WITHOUT COMPLICATION, WITHOUT LONG-TERM CURRENT USE OF INSULIN (HCC): ICD-10-CM

## 2024-04-10 NOTE — TELEPHONE ENCOUNTER
Last time medication was refilled 01/19/2024  Last OV 03/08/2024  Next OV due/scheduled   Future Appointments   Date Time Provider Department Center   4/18/2024  1:30 PM Olivia Landeros MD  [facility]   4/18/2024  1:45 PM  [place] RN1  [facility]   9/13/2024  2:00 PM Serafin Jack MD [facility] [address]       Failed protocol.     Sent to [facility] for approval.

## 2024-04-18 ENCOUNTER — OFFICE VISIT (OUTPATIENT)
Dept: HEMATOLOGY/ONCOLOGY | Facility: HOSPITAL | Age: 59
End: 2024-04-18
Attending: INTERNAL MEDICINE
Payer: COMMERCIAL

## 2024-04-18 VITALS
OXYGEN SATURATION: 98 % | WEIGHT: 222.63 LBS | HEART RATE: 86 BPM | HEIGHT: 74.02 IN | RESPIRATION RATE: 17 BRPM | SYSTOLIC BLOOD PRESSURE: 129 MMHG | DIASTOLIC BLOOD PRESSURE: 77 MMHG | BODY MASS INDEX: 28.57 KG/M2 | TEMPERATURE: 98 F

## 2024-04-18 DIAGNOSIS — R74.01 TRANSAMINITIS: ICD-10-CM

## 2024-04-18 DIAGNOSIS — T88.7XXA MEDICATION SIDE EFFECTS: ICD-10-CM

## 2024-04-18 DIAGNOSIS — D80.1 HYPOGAMMAGLOBULINEMIA (HCC): Primary | ICD-10-CM

## 2024-04-18 DIAGNOSIS — D69.6 THROMBOCYTOPENIA (HCC): ICD-10-CM

## 2024-04-18 DIAGNOSIS — H20.9 IRITIS: ICD-10-CM

## 2024-04-18 DIAGNOSIS — C91.10 CLL (CHRONIC LYMPHOCYTIC LEUKEMIA) (HCC): ICD-10-CM

## 2024-04-18 DIAGNOSIS — C91.10 CLL (CHRONIC LYMPHOCYTIC LEUKEMIA) (HCC): Primary | ICD-10-CM

## 2024-04-18 DIAGNOSIS — D80.1 HYPOGAMMAGLOBULINEMIA (HCC): ICD-10-CM

## 2024-04-18 DIAGNOSIS — H15.001 SCLERITIS OF RIGHT EYE: ICD-10-CM

## 2024-04-18 LAB
ALBUMIN SERPL-MCNC: 4 G/DL (ref 3.4–5)
ALBUMIN/GLOB SERPL: 1.5 {RATIO} (ref 1–2)
ALP LIVER SERPL-CCNC: 69 U/L
ALT SERPL-CCNC: 31 U/L
ANION GAP SERPL CALC-SCNC: 7 MMOL/L (ref 0–18)
AST SERPL-CCNC: 23 U/L (ref 15–37)
BASOPHILS # BLD AUTO: 0.06 X10(3) UL (ref 0–0.2)
BASOPHILS NFR BLD AUTO: 0.6 %
BILIRUB SERPL-MCNC: 0.4 MG/DL (ref 0.1–2)
BUN BLD-MCNC: 21 MG/DL (ref 9–23)
CALCIUM BLD-MCNC: 9.2 MG/DL (ref 8.5–10.1)
CHLORIDE SERPL-SCNC: 105 MMOL/L (ref 98–112)
CO2 SERPL-SCNC: 25 MMOL/L (ref 21–32)
CREAT BLD-MCNC: 0.8 MG/DL
EGFRCR SERPLBLD CKD-EPI 2021: 103 ML/MIN/1.73M2 (ref 60–?)
EOSINOPHIL # BLD AUTO: 0.07 X10(3) UL (ref 0–0.7)
EOSINOPHIL NFR BLD AUTO: 0.7 %
ERYTHROCYTE [DISTWIDTH] IN BLOOD BY AUTOMATED COUNT: 12.3 %
GLOBULIN PLAS-MCNC: 2.7 G/DL (ref 2.8–4.4)
GLUCOSE BLD-MCNC: 163 MG/DL (ref 70–99)
HCT VFR BLD AUTO: 46.3 %
HGB BLD-MCNC: 16.3 G/DL
IMM GRANULOCYTES # BLD AUTO: 0.04 X10(3) UL (ref 0–1)
IMM GRANULOCYTES NFR BLD: 0.4 %
IMMUNOGLOBULIN PNL SER-MCNC: 262 MG/DL (ref 791–1643)
LDH SERPL L TO P-CCNC: 166 U/L
LYMPHOCYTES # BLD AUTO: 2.38 X10(3) UL (ref 1–4)
LYMPHOCYTES NFR BLD AUTO: 23.6 %
MCH RBC QN AUTO: 30.9 PG (ref 26–34)
MCHC RBC AUTO-ENTMCNC: 35.2 G/DL (ref 31–37)
MCV RBC AUTO: 87.9 FL
MONOCYTES # BLD AUTO: 0.58 X10(3) UL (ref 0.1–1)
MONOCYTES NFR BLD AUTO: 5.7 %
NEUTROPHILS # BLD AUTO: 6.96 X10 (3) UL (ref 1.5–7.7)
NEUTROPHILS # BLD AUTO: 6.96 X10(3) UL (ref 1.5–7.7)
NEUTROPHILS NFR BLD AUTO: 69 %
OSMOLALITY SERPL CALC.SUM OF ELEC: 291 MOSM/KG (ref 275–295)
PLATELET # BLD AUTO: 151 10(3)UL (ref 150–450)
POTASSIUM SERPL-SCNC: 4.3 MMOL/L (ref 3.5–5.1)
PROT SERPL-MCNC: 6.7 G/DL (ref 6.4–8.2)
RBC # BLD AUTO: 5.27 X10(6)UL
SODIUM SERPL-SCNC: 137 MMOL/L (ref 136–145)
WBC # BLD AUTO: 10.1 X10(3) UL (ref 4–11)

## 2024-04-18 PROCEDURE — 83615 LACTATE (LD) (LDH) ENZYME: CPT

## 2024-04-18 PROCEDURE — 80053 COMPREHEN METABOLIC PANEL: CPT

## 2024-04-18 PROCEDURE — 96365 THER/PROPH/DIAG IV INF INIT: CPT

## 2024-04-18 PROCEDURE — 99215 OFFICE O/P EST HI 40 MIN: CPT | Performed by: INTERNAL MEDICINE

## 2024-04-18 PROCEDURE — 96366 THER/PROPH/DIAG IV INF ADDON: CPT

## 2024-04-18 PROCEDURE — 82784 ASSAY IGA/IGD/IGG/IGM EACH: CPT

## 2024-04-18 PROCEDURE — 85025 COMPLETE CBC W/AUTO DIFF WBC: CPT

## 2024-04-18 RX ORDER — DIPHENHYDRAMINE HCL 25 MG
25 CAPSULE ORAL ONCE
Status: CANCELLED | OUTPATIENT
Start: 2024-04-18

## 2024-04-18 RX ORDER — ACETAMINOPHEN 325 MG/1
650 TABLET ORAL ONCE
Status: CANCELLED | OUTPATIENT
Start: 2024-04-18

## 2024-04-18 RX ORDER — DIPHENHYDRAMINE HCL 25 MG
25 CAPSULE ORAL ONCE
Status: COMPLETED | OUTPATIENT
Start: 2024-04-18 | End: 2024-04-18

## 2024-04-18 RX ORDER — ACETAMINOPHEN 325 MG/1
650 TABLET ORAL ONCE
OUTPATIENT
Start: 2024-04-18

## 2024-04-18 RX ORDER — DIPHENHYDRAMINE HCL 25 MG
25 CAPSULE ORAL ONCE
OUTPATIENT
Start: 2024-04-18

## 2024-04-18 RX ORDER — ACETAMINOPHEN 325 MG/1
650 TABLET ORAL ONCE
Status: COMPLETED | OUTPATIENT
Start: 2024-04-18 | End: 2024-04-18

## 2024-04-18 RX ADMIN — ACETAMINOPHEN 650 MG: 325 TABLET ORAL at 14:03:00

## 2024-04-18 RX ADMIN — DIPHENHYDRAMINE HCL 25 MG: 25 MG CAPSULE ORAL at 14:03:00

## 2024-04-18 NOTE — PROGRESS NOTES
Cancer Center Progress Note    Patient Name: Deniz Aguilera   YOB: 1965   Medical Record Number: [id number]   CSN: [id number]   Attending Physician: Olivia Landeros M.D.   Referring Physician: Serafin Jack MD      Date of Visit: 4/18/2024     Chief Complaint:  Chief Complaint   Patient presents with    Follow - Up     CLL pt here for f/u and IVIG, he continues on ibrutinib without difficulty. Energy is low, has occasional loose stools, eating well, denies B symptoms/illness. States 3 weeks ago had  Iritis, he did not notify the office, he stopped ibrutinib, started eye drops and prednisone. Resolved in two weeks, restarted ibrutinib 8 days ago, will start steroid taper tomorrow.            Diagnosis:  CLL (originally diagnosed with stage II CLL at presentation with lymphocytosis with lymphadenopathy) diagnosed 7/2013     - noted increase in neck nodes and underwent excisional biopsy of a right cervical lymph node 5/27/16. Pathology revealed CLL/SLL- no evidence of transformation.     -  del(17p), TP53, IGHV mutation negative         - started Ibrutinib 11/7/18.     Presented to the ED 9/1/19 c/o \"feeling like dirt\" with fever, sore throat, chills, body aches, sinus headaches. No appetite with PO intake poor last few days. CT head showed no acute findings. Felt to have possible viral syndrome with dehydration and was sent home. He then developed diarrhea and took imodium. Has not had a BM last 2 days. Reports intermittent \"bloody boogers\" but no bleeding elsewhere.      Was seen at the [facility] by one of our APNs and was noted to have thrombocytopenia and elevated transaminases. Was admitted for further w/u and management. Took up to 7 tabs of tylenol one day- not sure of dose. No ETOH lately or illicit drug use. No known h/o hepatitis. Does have fatty liver seen on previous imaging.      Ibrutinib was held and was hydrated. Bacterial cultures and respiratory panel were negative. LDH was elevated at  1050. Viral titers were sent (EBV/HSV and CMV). EBV IgG and nuclear AG ABS positive but IgM negative. IgG levels were 179. Acute hepatitis panel was negative. No significant schistocytes seen on smear.     He was given IVIG for hypogammaglobulinemia. CT C/A/P showed splenomegaly with decrease in known nodes. Slowly improved and was discharged home 9/7/19.     - Resumed Ibrutinib 10/4/19      History of Present Illness:  Had been doing fine until about 3 weeks ago had iritis again. Did not notify us but did see ophth and was placed on oral and ophthalmic steroids with resolution of symptoms. Says will start oral steroid taper tomorrow. Held ibrutinib on his own for 2 weeks and resumed this about 8 days ago. No episodes of pancreatitis. No fevers.  Chronic night sweats. Denies SOB or change in urinary habits. No bleeding or palpitations. Has occasional loose stools Appetite goof. No B symptoms.         Current Medications:    Current Outpatient Medications:     metFORMIN 850 MG Oral Tab, Take 1 tablet (850 mg total) by mouth 2 (two) times daily., Disp: 180 tablet, Rfl: 0    ondansetron (ZOFRAN) 8 MG tablet, Take 1 tablet (8 mg total) by mouth every 8 (eight) hours as needed for Nausea., Disp: 30 tablet, Rfl: 3    GLIMEPIRIDE 4 MG Oral Tab, TAKE 1 TABLET TWICE A DAY  WITH MEALS, Disp: 180 tablet, Rfl: 1    fluconazole 100 MG Oral Tab, Take 1 tablet (100 mg total) by mouth daily., Disp: 30 tablet, Rfl: 5    Ibrutinib (IMBRUVICA) 420 MG Oral Tab, Take 1 tablet (420 mg total) by mouth every morning., Disp: 28 tablet, Rfl: 5    BENAZEPRIL HCL 10 MG Oral Tab, TAKE 1 TABLET DAILY, Disp: 90 tablet, Rfl: 1    Sildenafil Citrate 100 MG Oral Tab, TAKE 1 TABLET DAILY AS NEEDED FOR ERECTILE DYSFUNCTION. MAX DOSE 100 MG IN 24 HOURS, Disp: 30 tablet, Rfl: 3    Fluticasone Propionate, Inhal, (FLOVENT DISKUS) 250 MCG/ACT Inhalation Aerosol Powder, Breath Activated, USE 1 INHALATION TWICE A   DAY, Disp: 3 each, Rfl: 3    FARXIGA 10 MG  Oral Tab, TAKE 1 TABLET DAILY, Disp: 90 tablet, Rfl: 1    acyclovir 400 MG Oral Tab, Take 1 tablet (400 mg total) by mouth 2 (two) times daily., Disp: 180 tablet, Rfl: 3    immune globulin, human, (GAMMAGARD) 20 g/200mL Injection Solution, Inject every 4 weeks, Disp: 20 g, Rfl: 11    ATORVASTATIN 40 MG Oral Tab, TAKE 1 TABLET DAILY, Disp: 90 tablet, Rfl: 3    PEG 3350-KCl-NaBcb-NaCl-NaSulf (PEG 3350/ELECTROLYTES) 240 g Oral Recon Soln, Take as directed by Doctor, Disp: 4000 mL, Rfl: 0    B Complex-C-Folic Acid (VITALINE BIOTIN FORTE) 0.8 MG Oral Tab, Take by mouth., Disp: , Rfl:     Prochlorperazine Maleate (COMPAZINE) 10 mg tablet, TAKE 1 TABLET BY MOUTH EVERY SIX HOURS AS NEEDED FOR NAUSEA, Disp: 30 tablet, Rfl: 3    Cyanocobalamin (VITAMIN B 12 OR), Take by mouth., Disp: , Rfl:     aspirin 81 MG Oral Tab, Take 1 tablet (81 mg total) by mouth daily., Disp: , Rfl: 0    Fexofenadine HCl (WAL-FEX ALLERGY) 180 MG Oral Tab, Take 1 tablet by mouth once daily., Disp: 90 tablet, Rfl: 1    Multiple Vitamins-Minerals (CENTRUM SILVER ULTRA MENS) Oral Tab, Take  by mouth., Disp: , Rfl:     Past Medical History:  Past Medical History:    Back pain    Blood in urine    Cancer (HCC)    CLL-monitored by Dr. Landeros    Decorative tattoo    Diarrhea, unspecified    Essential hypertension, benign    Controlled     Extrinsic asthma, unspecified    High cholesterol    Kidney stone    Laboratory examination ordered as part of a routine general medical examination    Type II or unspecified type diabetes mellitus without mention of complication, not stated as uncontrolled    Unspecified essential hypertension    Visual impairment    glasses    Wears glasses       Past Surgical History:  Past Surgical History:   Procedure Laterality Date    Colonoscopy      Colonoscopy      Other  05/27/2016    cervical lad       Family Medical History:  Family History   Problem Relation Age of Onset    Other (CHF) Maternal Grandmother     Other (Acute  Myocardial Infarction) Paternal Grandfather     Asthma Mother     Diabetes Paternal Grandmother        Psychosocial History:  Social History     Socioeconomic History    Marital status:      Spouse name: Not on file    Number of children: Not on file    Years of education: Not on file    Highest education level: Not on file   Occupational History    Not on file   Tobacco Use    Smoking status: Every Day     Types: Pipe    Smokeless tobacco: Never    Tobacco comments:     pipes and cigars   Vaping Use    Vaping status: Never Used   Substance and Sexual Activity    Alcohol use: Yes     Alcohol/week: 4.0 - 6.0 standard drinks of alcohol     Types: 4 - 6 Cans of beer per week     Comment: 3-4  Beers/week    Drug use: No    Sexual activity: Not on file   Other Topics Concern     Service Not Asked    Blood Transfusions Not Asked    Caffeine Concern Yes     Comment: 32-40 oz per day/coffee/tea    Occupational Exposure Not Asked    Hobby Hazards Not Asked    Sleep Concern Not Asked    Stress Concern Not Asked    Weight Concern Not Asked    Special Diet Not Asked    Back Care Not Asked    Exercise No    Bike Helmet Not Asked    Seat Belt Not Asked    Self-Exams Not Asked   Social History Narrative    Not on file     Social Determinants of Health     Financial Resource Strain: Not on file   Food Insecurity: Not on file   Transportation Needs: Not on file   Physical Activity: Not on file   Stress: Not on file   Social Connections: Not on file   Housing Stability: Not on file         Allergies:  Allergies   Allergen Reactions    Codeine OTHER (SEE COMMENTS)     nausea    Dust SHORTNESS OF BREATH    Mold SHORTNESS OF BREATH    Pollen SHORTNESS OF BREATH        Review of Systems:  A 14-point ROS was done with pertinent positives and negative per the HPI    Vital Signs:   04/18/24   Height 1.88 m (6' 2.02\")   Weight 101 kg (222 lb 9.6 oz)   BSA (Calculated - sq m) 2.27 sq meters   BMI (Calculated) 28.57 kg/m2   BP  129/77   Pulse 86   BP Location Right arm   Patient Position Sitting   Temp 98 °F (36.7 °C)       Physical Examination:  General: Patient is alert and oriented x 3, not in acute distress.  Psych:  Mood and affect appropriate  HEENT: EOMs intact. PERRL. Oropharynx is clear. Neck: No JVD. No significant adenopathy.   Neck is supple.  Chest: Clear to auscultation.  Heart: Regular rate and rhythm.   Abdomen: Soft, non tender with good bowel sounds.  No hepatosplenomegaly. No palpable mass.  Extremities: Pedal pulses are present. No BLE edema. Some with bruising and scabbing from scratches.   Neurological: Grossly intact.          Laboratory:  Lab Results   Component Value Date    WBC 10.1 04/18/2024    RBC 5.27 04/18/2024    HGB 16.3 04/18/2024    HCT 46.3 04/18/2024    .0 04/18/2024    MCV 87.9 04/18/2024    MCH 30.9 04/18/2024    MCHC 35.2 04/18/2024    RDW 12.3 04/18/2024    NEPRELIM 6.96 04/18/2024    NEUTABS 4.91 09/07/2019    LYMPHABS 2.42 09/07/2019    EOSABS 0.00 09/07/2019    BASABS 0.00 09/07/2019    NEUT 60 09/07/2019    LYMPH 31 09/07/2019    MON 6 09/07/2019    EOS 0 09/07/2019    BASO 0 09/07/2019    NEPERCENT 69.0 04/18/2024    LYPERCENT 23.6 04/18/2024    MOPERCENT 5.7 04/18/2024    EOPERCENT 0.7 04/18/2024    BAPERCENT 0.6 04/18/2024    NE 6.96 04/18/2024    LYMABS 2.38 04/18/2024    MOABSO 0.58 04/18/2024    EOABSO 0.07 04/18/2024    BAABSO 0.06 04/18/2024     Lab Results   Component Value Date     (H) 04/18/2024    BUN 21 04/18/2024    BUNCREA 15.4 06/11/2021    CREATSERUM 0.80 04/18/2024    ANIONGAP 7 04/18/2024    GFR 98 01/11/2018    GFRNAA 95 07/22/2022    GFRAA 110 07/22/2022    CA 9.2 04/18/2024    OSMOCALC 291 04/18/2024    ALKPHO 69 04/18/2024    AST 23 04/18/2024    ALT 31 04/18/2024    BILT 0.4 04/18/2024    TP 6.7 04/18/2024    ALB 4.0 04/18/2024    GLOBULIN 2.7 (L) 04/18/2024    AGRATIO 2.7 (H) 06/29/2013     04/18/2024    K 4.3 04/18/2024     04/18/2024    CO2  25.0 04/18/2024     Lab Component   Component Value Date/Time     04/18/2024 1327     08/04/2014 1537      Latest Reference Range & Units 10/20/23 07:55 01/19/24 12:49 04/18/24 13:27   IMMUNOGLOBULIN G 791 - 1,643 mg/dL 300 (L) 297 (L) 262 (L)   (L): Data is abnormally low    Radiology:  PROCEDURE:  US SCROTUM W/ DOPPLER (CPT=93975/44916)     COMPARISON:  None.     INDICATIONS:  N50.89 Testicle swelling N50.811 Pain in right testicle     TECHNIQUE:  Real time grey scale ultrasound was performed of the scrotal contents including the testicles, epididymis, spermatic cord, and scrotal wall. A duplex scan with B-mode, Doppler color flow, and spectral analysis were also performed.     PATIENT STATED HISTORY: (As transcribed by Technologist)  Patient c/o left scrotal swelling.         FINDINGS:    TESTES:  The right testicle is located superiorly to the scrotal sac within the right inguinal region.  No focal testicular lesions on the right or left noted.  There are however multiple small nonspecific parenchymal calcifications.  EPIDIDYMIS:  Negative.  HYDROCELE:  Moderate bilateral hydroceles.  VARICOCELE:  Negative  OTHER:  Negative.                   Impression   CONCLUSION:    1. Moderate bilateral hydroceles.  2. The right testicle is superiorly displaced within the right inguinal canal, superior to the scrotal sac.        LOCATION:  [facility]           Dictated by (CST): Polo Lim DO on 1/26/2024 at 4:44 PM      Finalized by (CST): Polo Lim DO on 1/26/2024 at 4:47 PM         PROCEDURE:  US ABDOMEN COMPLETE (CPT=76700)     COMPARISON:  None.     INDICATIONS:  abnormal labs     TECHNIQUE:  Real time gray-scale ultrasound was used to evaluate the abdomen.  The exam includes images of the liver, gallbladder, common bile duct, pancreas, spleen, kidneys, IVC, and aorta.     PATIENT STATED HISTORY: (As transcribed by Technologist)           FINDINGS:    LIVER:  There is mild increased echogenicity of the  liver which may represent fatty infiltration or hepatocellular disease.  Please note that this limits sensitivity for a focal hepatic lesion.  BILIARY:  No gallstones or pericholecystic fluid is seen.  No gallbladder wall thickening.  Low level echogenicity within the gallbladder likely represents sludge.  Common bile duct diameter is 5 mm.  PANCREAS:  The pancreas is not well seen due to overlying bowel gas.  SPLEEN:  Spleen measures up to 13 cm in length.  KIDNEYS:  Right kidney measures 12 cm.  Left kidney measures 11.9 cm.  AORTA/IVC:  Normal.                   Impression   CONCLUSION:  There is mild increased echogenicity of the liver which may represent fatty infiltration or hepatocellular disease.  Please note that this limits sensitivity for a focal hepatic lesion.     Gallbladder sludge is present.  No evidence of pericholecystic fluid or gallbladder wall thickening.        LOCATION:  [id number]           Dictated by (CST): Hitesh Aburto MD on 9/14/2023 at 6:47 PM      Finalized by (CST): Hitesh Aburto MD on 9/14/2023 at 6:50 PM         Narrative   PROCEDURE:  XR LUMBAR SPINE (MIN 2 VIEWS) (CPT=72100)     LOCATION:  [facility]       TECHNIQUE:  AP, lateral, and coned down L5-S1 views were obtained.     COMPARISON:  None.     INDICATIONS:  M54.40 Low back pain with sciatica, sciatica laterality unspecified, unspecified back pain laterality, unspecified chronicity     PATIENT STATED HISTORY: (As transcribed by Technologist)  Patient is having an orthopedic evaluation.  Patient stated he has a slap lesion in his Right shoulder.  He stated it started hurting 1 week ago.  Patient is also having sever low back pain.           FINDINGS:    Trace right convex curvature of the lumbar spine.  Moderate facet arthropathy is identified at L4-5 and L5-S1.  Extensive atherosclerosis.  Mild to moderate endplate degenerative changes.  The vertebral body heights and disc heights are preserved.  No  acute displaced osseous fracture.                    Impression   CONCLUSION:  Mild to moderate degenerative changes in the lumbar spine.        Dictated by (CST): Stromberg, LeRoy, MD on 1/12/2023 at 12:55 PM      Finalized by (CST): Stromberg, LeRoy, MD on 1/12/2023 at 12:57 PM       PROCEDURE:  CT CHEST+ABDOMEN+PELVIS(ALL CNTRST ONLY)(CPT=71260/47278)     COMPARISON:  [facility] , CT CHEST+ABDOMEN+PELVIS(ALL CNTRST ONLY)(CPT=71260/35713), 6/24/2016, 7:40.  [facility], CT ABDOMEN+PELVIS(ALL W+WO)(CPT=74178), 5/04/2019, 7:06.     INDICATIONS:  CLL, elevated transaminases, fever     TECHNIQUE:  IV contrast-enhanced scanning through the chest, abdomen, and pelvis was performed.  Dose reduction techniques were used. Dose information is transmitted to the ACR (American College of Radiology) NRDR (National Radiology Data Registry) which   includes the Dose Index Registry.     PATIENT STATED HISTORY:(As transcribed by Technologist)  Inpatient.  Patient presents with fever, bodyache and back pain.  Hx CLL      CONTRAST USED:  100cc of Omnipaque 350     FINDINGS:    CHEST:       LUNGS/PLEURA:  New bilateral posterior lower lobe atelectasis, greater on the left, without pleural effusion.  Stable 5 mm right lower lobe pulmonary nodule, no change since June 2016, more than 3 years, benign     THORACIC AORTA:  No aneurysm or other acute process     MEDIASTINUM/AURA:  Stable right hilar lymph node 17 x 18 mm size..     CARDIAC:  Unremarkable.     CHEST WALL:  Unremarkable.     OTHER:  Bilateral axillary lymph nodes have decreased considerably in size, with the largest lymph node now measuring 9 mm in greatest short axis dimension, image 48 in the left axilla, all other lymph nodes are smaller, with previous size measurements   up to 3.4 cm on the right, and up to 2.4 cm on the left..     ABDOMEN/PELVIS:     LIVER:  Unremarkable.      BILIARY:  Unremarkable.     PANCREAS:  Unremarkable.     SPLEEN:  The spleen has increased in size since the prior CT scan from May 2019,  17.2 cm cephalocaudal dimension, previously 13.1 cm.  It measures  15.3 cm in AP dimension, previously 13.3 cm.  Splenic attenuation is homogeneous.      KIDNEYS:  No acute abnormality.     ADRENALS:  Unremarkable.     AORTA/VASCULAR:  No aortic aneurysm.      RETROPERITONEUM:  A previously measured 12 x 12 mm left para-aortic retroperitoneal lymph node has decreased in size, now measuring about 8-9 mm.  Other retroperitoneal lymph nodes are sub cm, none showing any increase in size.  There are numerous   mesenteric lymph nodes as well subcentimeter, and stable when measured in short axis dimension. .     BOWEL/MESENTERY:  No acute process. No evidence for small bowel obstruction, with administered oral contrast passing through the small bowel, and opacifying the colon by the time of the study. No sign of dilation of the small bowel.  No ascites, free   air, colitis, diverticulitis or excessive stool.  Appendix is visualized, normal.        ABDOMINAL WALL:  Unremarkable.     URINARY BLADDER:  Unremarkable.      PELVIC NODES:  Unremarkable.     PELVIC ORGANS:  No acute process.      OTHER:  None.     SKELETAL STRUCTURES IN THE CHEST/ABDOMEN/PELVIS:     BONES:  No acute abnormality.         =====  CONCLUSION:       1. Development of splenomegaly, with obvious change in the volume of the spleen when compared with a relatively recent CT scan of the abdomen from May 2019. Measurements are given above, including cephalocaudal dimension of 17.2 cm, previously 13.1 cm.    Enhancement of the spleen is homogeneous without sign of mass, or nodule, and there is no perisplenic fluid.     2. Decrease in the size of previously clearly enlarged axillary lymph nodes, now normal size, the largest measuring 9 mm left axilla.  Stable right hilar lymph node mildly enlarged.     3. Decrease in the size of retroperitoneal lymph node.  Numerous subcentimeter stable appearing retroperitoneal and mesenteric lymph nodes.     4. No bowel  obstruction, ascites, free air, abscess, colitis, diverticulitis or other acute process.          Dictated by: Jacek Manzano MD on 9/06/2019 at 9:23       Approved by: Jacek Manzano MD             Impression and Plan:  1. CLL- on Ibrutinib and tolerating ok.  WBC/lymphs, Hgb and platelets normal! No bleeding. Continued monitoring for AEs- no bleeding, severe infections or arrhythmias. However developed iritis again- see below.     2. Increased creatinine- Back to normal    3. Elevated LFTs-   had bout of pancreatitis from alcohol. Advised moderation if not stop altogether. He says what happened was not typical for him but was at school reunion. LFTs have been good lately including today    4. Hypogammaglobulinemia- was on monthly IVIG and tolerated this well. As has not had recent severe infections for some time started spaced out infusions to Q 8-> 12 weeks--> 6 months.  He is aware of signs and symptoms to watch out for.     5. 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 I had discussed this with him at length. I told him to let me know if he has another episode. He had another episode and did not tell us but did let ophth know. Symptoms resolved and to start steroid taper tomorrow. Though it has been >1.5 years since his last flare discussed switching him to a different BTKi. He says he understands but does not want to switch especially given potential cost issues. For now he would like to continue on Ibrutinib and promises to let us know when he has another flare. Work-up for possible  autoimmune disorders and other potential etiology negative. Continues to be followed by ophth.       6. Pancreatitis- see (3). Last lipase was done to 99 (from 693) and symptoms have resolved. F/u with GI with additional w/u ordered bu tnot done as yet.     7. Swollen left testicle with pain with palpation- US showed no evidence of torsion and showed  bilateral hydroceles. Right testicle was superiorly displaced but denied any symptoms of the right. F/u with  if continued or worsening problems.     Labs reviewed with him    Continue Ibrutinib,  prophylactic antibiotics and IVIG    RTC 3 months. IVIG Q 6 months    Risk level high- CLL on targeted therapy and IVIG    Emotional Well Being:  I have assessed the patient's emotional well-being and any concerns about anxiety or depression.  We discussed issues of distress, coping difficulties and social support systems and currently there are no active problems.      Olivia Landeros MD  [facility]

## 2024-04-18 NOTE — PROGRESS NOTES
Education Record    Learner:  Patient    Disease / Diagnosis: Hypogammaglobulinemia    Barriers / Limitations:  None   Comments:    Method:  Brief focused and Reinforcement   Comments:    General Topics:  Plan of care reviewed   Comments:    Outcome:  Shows understanding   Comments:    Patient tolerated IVIG and discharged in stable condition.

## 2024-04-19 ENCOUNTER — APPOINTMENT (OUTPATIENT)
Dept: HEMATOLOGY/ONCOLOGY | Facility: HOSPITAL | Age: 59
End: 2024-04-19
Attending: INTERNAL MEDICINE
Payer: COMMERCIAL

## 2024-05-11 DIAGNOSIS — E11.9 TYPE 2 DIABETES MELLITUS WITHOUT COMPLICATION, WITHOUT LONG-TERM CURRENT USE OF INSULIN (HCC): ICD-10-CM

## 2024-05-11 RX ORDER — DAPAGLIFLOZIN 10 MG/1
TABLET, FILM COATED ORAL
Qty: 90 TABLET | Refills: 1 | Status: SHIPPED | OUTPATIENT
Start: 2024-05-11

## 2024-05-17 DIAGNOSIS — C91.10 CLL (CHRONIC LYMPHOCYTIC LEUKEMIA) (HCC): Primary | ICD-10-CM

## 2024-05-22 ENCOUNTER — TELEPHONE (OUTPATIENT)
Dept: HEMATOLOGY/ONCOLOGY | Facility: HOSPITAL | Age: 59
End: 2024-05-22

## 2024-05-22 NOTE — TELEPHONE ENCOUNTER
[facility] calling re: drug interaction between Acalabrutinib and Fluconazole. Pharmacy states Fluconazole increases strength of Acalabrutinib. Pharmacy wanting to know if  wants to change medication to once a day.       Msg sent to

## 2024-05-23 ENCOUNTER — TELEPHONE (OUTPATIENT)
Dept: HEMATOLOGY/ONCOLOGY | Facility: HOSPITAL | Age: 59
End: 2024-05-23

## 2024-06-21 DIAGNOSIS — E11.9 TYPE 2 DIABETES MELLITUS WITHOUT COMPLICATION, WITHOUT LONG-TERM CURRENT USE OF INSULIN (HCC): ICD-10-CM

## 2024-06-22 NOTE — TELEPHONE ENCOUNTER
Last time medication was refilled: 4/10/24  Next office visit due/scheduled: 9/13/24  Last office visit: 3/8/24  Last Labs: 1/18/24

## 2024-07-18 ENCOUNTER — OFFICE VISIT (OUTPATIENT)
Dept: HEMATOLOGY/ONCOLOGY | Facility: HOSPITAL | Age: 59
End: 2024-07-18
Attending: INTERNAL MEDICINE
Payer: COMMERCIAL

## 2024-07-18 VITALS
WEIGHT: 226 LBS | RESPIRATION RATE: 14 BRPM | SYSTOLIC BLOOD PRESSURE: 128 MMHG | OXYGEN SATURATION: 97 % | DIASTOLIC BLOOD PRESSURE: 76 MMHG | BODY MASS INDEX: 29 KG/M2 | HEIGHT: 74.02 IN | TEMPERATURE: 98 F | HEART RATE: 98 BPM

## 2024-07-18 DIAGNOSIS — T88.7XXA MEDICATION SIDE EFFECTS: ICD-10-CM

## 2024-07-18 DIAGNOSIS — E11.9 TYPE 2 DIABETES MELLITUS WITHOUT COMPLICATION, WITHOUT LONG-TERM CURRENT USE OF INSULIN (HCC): Primary | ICD-10-CM

## 2024-07-18 DIAGNOSIS — R74.01 TRANSAMINITIS: ICD-10-CM

## 2024-07-18 DIAGNOSIS — H20.9 IRITIS: ICD-10-CM

## 2024-07-18 DIAGNOSIS — D69.6 THROMBOCYTOPENIA (HCC): ICD-10-CM

## 2024-07-18 DIAGNOSIS — H15.001 SCLERITIS OF RIGHT EYE: ICD-10-CM

## 2024-07-18 DIAGNOSIS — D80.1 HYPOGAMMAGLOBULINEMIA (HCC): ICD-10-CM

## 2024-07-18 DIAGNOSIS — C91.10 CLL (CHRONIC LYMPHOCYTIC LEUKEMIA) (HCC): ICD-10-CM

## 2024-07-18 LAB
ALBUMIN SERPL-MCNC: 4.6 G/DL (ref 3.2–4.8)
ALBUMIN/GLOB SERPL: 2.4 {RATIO} (ref 1–2)
ALP LIVER SERPL-CCNC: 61 U/L
ALT SERPL-CCNC: 25 U/L
ANION GAP SERPL CALC-SCNC: 6 MMOL/L (ref 0–18)
AST SERPL-CCNC: 27 U/L (ref ?–34)
BASOPHILS # BLD AUTO: 0.06 X10(3) UL (ref 0–0.2)
BASOPHILS NFR BLD AUTO: 0.8 %
BILIRUB SERPL-MCNC: 0.4 MG/DL (ref 0.3–1.2)
BUN BLD-MCNC: 19 MG/DL (ref 9–23)
CALCIUM BLD-MCNC: 9.7 MG/DL (ref 8.7–10.4)
CHLORIDE SERPL-SCNC: 106 MMOL/L (ref 98–112)
CO2 SERPL-SCNC: 24 MMOL/L (ref 21–32)
CREAT BLD-MCNC: 0.94 MG/DL
EGFRCR SERPLBLD CKD-EPI 2021: 93 ML/MIN/1.73M2 (ref 60–?)
EOSINOPHIL # BLD AUTO: 0.13 X10(3) UL (ref 0–0.7)
EOSINOPHIL NFR BLD AUTO: 1.8 %
ERYTHROCYTE [DISTWIDTH] IN BLOOD BY AUTOMATED COUNT: 12.3 %
EST. AVERAGE GLUCOSE BLD GHB EST-MCNC: 148 MG/DL (ref 68–126)
GLOBULIN PLAS-MCNC: 1.9 G/DL (ref 2.8–4.4)
GLUCOSE BLD-MCNC: 155 MG/DL (ref 70–99)
HBA1C MFR BLD: 6.8 % (ref ?–5.7)
HCT VFR BLD AUTO: 42.6 %
HGB BLD-MCNC: 14.9 G/DL
IMM GRANULOCYTES # BLD AUTO: 0.02 X10(3) UL (ref 0–1)
IMM GRANULOCYTES NFR BLD: 0.3 %
IMMUNOGLOBULIN PNL SER-MCNC: 217 MG/DL (ref 650–1600)
LDH SERPL L TO P-CCNC: 128 U/L
LYMPHOCYTES # BLD AUTO: 1.33 X10(3) UL (ref 1–4)
LYMPHOCYTES NFR BLD AUTO: 18.2 %
MCH RBC QN AUTO: 32 PG (ref 26–34)
MCHC RBC AUTO-ENTMCNC: 35 G/DL (ref 31–37)
MCV RBC AUTO: 91.4 FL
MONOCYTES # BLD AUTO: 0.52 X10(3) UL (ref 0.1–1)
MONOCYTES NFR BLD AUTO: 7.1 %
NEUTROPHILS # BLD AUTO: 5.25 X10 (3) UL (ref 1.5–7.7)
NEUTROPHILS # BLD AUTO: 5.25 X10(3) UL (ref 1.5–7.7)
NEUTROPHILS NFR BLD AUTO: 71.8 %
OSMOLALITY SERPL CALC.SUM OF ELEC: 287 MOSM/KG (ref 275–295)
PLATELET # BLD AUTO: 142 10(3)UL (ref 150–450)
POTASSIUM SERPL-SCNC: 4.2 MMOL/L (ref 3.5–5.1)
PROT SERPL-MCNC: 6.5 G/DL (ref 5.7–8.2)
RBC # BLD AUTO: 4.66 X10(6)UL
SODIUM SERPL-SCNC: 136 MMOL/L (ref 136–145)
WBC # BLD AUTO: 7.3 X10(3) UL (ref 4–11)

## 2024-07-18 PROCEDURE — 99215 OFFICE O/P EST HI 40 MIN: CPT | Performed by: INTERNAL MEDICINE

## 2024-07-18 NOTE — PROGRESS NOTES
[facility] Center Progress Note    Patient Name: Deniz Aguilera   YOB: 1965   Medical Record Number: [id number]   CSN: [id number]   Attending Physician: Olivia Landeros M.D.   Referring Physician: Serafin Jack MD      Date of Visit: 7/18/2024     Chief Complaint:  Chief Complaint   Patient presents with    Follow - Up         Diagnosis:  CLL (originally diagnosed with stage II CLL at presentation with lymphocytosis with lymphadenopathy) diagnosed 7/2013     - noted increase in neck nodes and underwent excisional biopsy of a right cervical lymph node 5/27/16. Pathology revealed CLL/SLL- no evidence of transformation.     -  del(17p), TP53, IGHV mutation negative         - started Ibrutinib 11/7/18.     Presented to the ED 9/1/19 c/o \"feeling like dirt\" with fever, sore throat, chills, body aches, sinus headaches. No appetite with PO intake poor last few days. CT head showed no acute findings. Felt to have possible viral syndrome with dehydration and was sent home. He then developed diarrhea and took imodium. Has not had a BM last 2 days. Reports intermittent \"bloody boogers\" but no bleeding elsewhere.      [facility] one of [facility] APNs and was noted to have thrombocytopenia and elevated transaminases. Was admitted for further w/u and management. Took up to 7 tabs of tylenol one day- not sure of dose. No ETOH lately or illicit drug use. No known h/o hepatitis. Does have fatty liver seen on previous imaging.      Ibrutinib was held and was hydrated. Bacterial cultures and respiratory panel were negative. LDH was elevated at 1050. Viral titers were sent (EBV/HSV and CMV). EBV IgG and nuclear AG ABS positive but IgM negative. IgG levels were 179. Acute hepatitis panel was negative. No significant schistocytes seen on smear.     He was given IVIG for hypogammaglobulinemia. CT C/A/P showed splenomegaly with decrease in known nodes. Slowly improved and was discharged home 9/7/19.     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 Eventually weaned off steroids. Has not had any further episodes since he switched. Had diarrhea he said for a week and also a mild headache. No episodes of pancreatitis. No fevers.  Chronic night sweats. Denies SOB or change in urinary habits. No bleeding or palpitations. Appetite good. No B symptoms or rash.     Current Medications:    Current Outpatient Medications:     METFORMIN 850 MG Oral Tab, TAKE 1 TABLET TWICE A DAY, Disp: 180 tablet, Rfl: 0    Acalabrutinib Maleate 100 MG Oral Tab, Take 100 mg by mouth 2 (two) times daily. Take 1 tablet (100 mg) twice daily, approximately 12 hours apart, with water.  May be taken with or without food., Disp: 60 tablet, Rfl: 11    FARXIGA 10 MG Oral Tab, TAKE 1 TABLET DAILY, Disp: 90 tablet, Rfl: 1    ondansetron (ZOFRAN) 8 MG tablet, Take 1 tablet (8 mg total) by mouth every 8 (eight) hours as needed for Nausea., Disp: 30 tablet, Rfl: 3    GLIMEPIRIDE 4 MG Oral Tab, TAKE 1 TABLET TWICE A DAY  WITH MEALS, Disp: 180 tablet, Rfl: 1    fluconazole 100 MG Oral Tab, Take 1 tablet (100 mg total) by mouth daily., Disp: 30 tablet, Rfl: 5    BENAZEPRIL HCL 10 MG Oral Tab, TAKE 1 TABLET DAILY, Disp: 90 tablet, Rfl: 1    Sildenafil Citrate 100 MG Oral Tab, TAKE 1 TABLET DAILY AS NEEDED FOR ERECTILE DYSFUNCTION. MAX DOSE 100 MG IN 24 HOURS, Disp: 30 tablet, Rfl: 3    Fluticasone Propionate, Inhal, (FLOVENT DISKUS) 250 MCG/ACT Inhalation Aerosol Powder, Breath Activated, USE 1 INHALATION TWICE A   DAY, Disp: 3 each, Rfl: 3    acyclovir 400 MG Oral Tab, Take 1 tablet (400 mg total) by mouth 2 (two) times daily., Disp: 180 tablet, Rfl: 3    immune globulin, human, (GAMMAGARD) 20 g/200mL Injection Solution, Inject every 4 weeks, Disp: 20 g, Rfl: 11    ATORVASTATIN 40 MG Oral Tab, TAKE 1 TABLET DAILY, Disp: 90 tablet, Rfl: 3    B Complex-C-Folic Acid (VITALINE BIOTIN FORTE)  0.8 MG Oral Tab, Take by mouth., Disp: , Rfl:     Prochlorperazine Maleate (COMPAZINE) 10 mg tablet, TAKE 1 TABLET BY MOUTH EVERY SIX HOURS AS NEEDED FOR NAUSEA, Disp: 30 tablet, Rfl: 3    Cyanocobalamin (VITAMIN B 12 OR), Take by mouth., Disp: , Rfl:     aspirin 81 MG Oral Tab, Take 1 tablet (81 mg total) by mouth daily., Disp: , Rfl: 0    Fexofenadine HCl (WAL-FEX ALLERGY) 180 MG Oral Tab, Take 1 tablet by mouth once daily., Disp: 90 tablet, Rfl: 1    Multiple Vitamins-Minerals (CENTRUM SILVER ULTRA MENS) Oral Tab, Take  by mouth., Disp: , Rfl:     Past Medical History:  Past Medical History:    Back pain    Blood in urine    Cancer (HCC)    CLL-monitored by Dr. Leti Gaitan tattoo    Diarrhea, unspecified    Essential hypertension, benign    Controlled     Extrinsic asthma, unspecified    High cholesterol    Kidney stone    Laboratory examination ordered as part of a routine general medical examination    Type II or unspecified type diabetes mellitus without mention of complication, not stated as uncontrolled    Unspecified essential hypertension    Visual impairment    glasses    Wears glasses       Past Surgical History:  Past Surgical History:   Procedure Laterality Date    Colonoscopy      Colonoscopy      Other  05/27/2016    cervical lad       Family Medical History:  Family History   Problem Relation Age of Onset    Other (CHF) Maternal Grandmother     Other (Acute Myocardial Infarction) Paternal Grandfather     Asthma Mother     Diabetes Paternal Grandmother        Psychosocial History:  Social History     Socioeconomic History    Marital status:      Spouse name: Not on file    Number of children: Not on file    Years of education: Not on file    Highest education level: Not on file   Occupational History    Not on file   Tobacco Use    Smoking status: Every Day     Types: Pipe    Smokeless tobacco: Never    Tobacco comments:     pipes and cigars   Vaping Use    Vaping status: Never Used    Substance and Sexual Activity    Alcohol use: Yes     Alcohol/week: 4.0 - 6.0 standard drinks of alcohol     Types: 4 - 6 Cans of beer per week     Comment: 3-4  Beers/week    Drug use: No    Sexual activity: Not on file   Other Topics Concern     Service Not Asked    Blood Transfusions Not Asked    Caffeine Concern Yes     Comment: 32-40 oz per day/coffee/tea    Occupational Exposure Not Asked    Hobby Hazards Not Asked    Sleep Concern Not Asked    Stress Concern Not Asked    Weight Concern Not Asked    Special Diet Not Asked    Back Care Not Asked    Exercise No    Bike Helmet Not Asked    Seat Belt Not Asked    Self-Exams Not Asked   Social History Narrative    Not on file     Social Determinants of Health     Financial Resource Strain: Not on file   Food Insecurity: Not on file   Transportation Needs: Not on file   Physical Activity: Not on file   Stress: Not on file   Social Connections: Not on file   Housing Stability: Not on file         Allergies:  Allergies   Allergen Reactions    Codeine OTHER (SEE COMMENTS)     nausea    Dust SHORTNESS OF BREATH    Mold SHORTNESS OF BREATH    Pollen SHORTNESS OF BREATH        Review of Systems:  A 14-point ROS was done with pertinent positives and negative per the HPI    Vital Signs:  /76 (BP Location: Left arm, Patient Position: Sitting, Cuff Size: large)   Pulse 98   Temp 97.6 °F (36.4 °C) (Temporal)   Resp 14   Ht 1.88 m (6' 2.02\")   Wt 102.5 kg (226 lb)   SpO2 97%   BMI 29.00 kg/m²         Physical Examination:  General: Patient is alert and oriented x 3, not in acute distress.  Psych:  Mood and affect appropriate  HEENT: EOMs intact. PERRL. Oropharynx is clear. Neck: No JVD. No significant adenopathy.   Neck is supple.  Chest: Clear to auscultation.  Heart: Regular rate and rhythm.   Abdomen: Soft, non tender with good bowel sounds.  No hepatosplenomegaly. No palpable mass.  Extremities: Pedal pulses are present. No BLE edema.   Neurological:  Grossly intact.          Laboratory:  Recent Results (from the past 24 hour(s))   LDH [E]    Collection Time: 07/18/24  1:45 PM   Result Value Ref Range     120-246 U/L U/L   COMP METABOLIC PANEL [E]    Collection Time: 07/18/24  1:45 PM   Result Value Ref Range    Glucose 155 (H) 70 - 99 mg/dL    Sodium 136 136 - 145 mmol/L    Potassium 4.2 3.5 - 5.1 mmol/L    Chloride 106 98 - 112 mmol/L    CO2 24.0 21.0 - 32.0 mmol/L    Anion Gap 6 0 - 18 mmol/L    BUN 19 9 - 23 mg/dL    Creatinine 0.94 0.70 - 1.30 mg/dL    Calcium, Total 9.7 8.7 - 10.4 mg/dL    Calculated Osmolality 287 275 - 295 mOsm/kg    eGFR-Cr 93 >=60 mL/min/1.73m2    AST 27 <34 U/L    ALT 25 10 - 49 U/L    Alkaline Phosphatase 61 45 - 117 U/L    Bilirubin, Total 0.4 0.3 - 1.2 mg/dL    Total Protein 6.5 5.7 - 8.2 g/dL    Albumin 4.6 3.2 - 4.8 g/dL    Globulin  1.9 (L) 2.8 - 4.4 g/dL    A/G Ratio 2.4 (H) 1.0 - 2.0    Patient Fasting for CMP? Patient not present    IMMUNOGLOBULIN G, QN, SERUM [E]    Collection Time: 07/18/24  1:45 PM   Result Value Ref Range    Immunoglobulin G 217 (L) 650 - 1,600 mg/dL   CBC W/ DIFFERENTIAL    Collection Time: 07/18/24  1:45 PM   Result Value Ref Range    WBC 7.3 4.0 - 11.0 x10(3) uL    RBC 4.66 4.30 - 5.70 x10(6)uL    HGB 14.9 13.0 - 17.5 g/dL    HCT 42.6 39.0 - 53.0 %    .0 (L) 150.0 - 450.0 10(3)uL    MCV 91.4 80.0 - 100.0 fL    MCH 32.0 26.0 - 34.0 pg    MCHC 35.0 31.0 - 37.0 g/dL    RDW 12.3 %    Neutrophil Absolute Prelim 5.25 1.50 - 7.70 x10 (3) uL    Neutrophil Absolute 5.25 1.50 - 7.70 x10(3) uL    Lymphocyte Absolute 1.33 1.00 - 4.00 x10(3) uL    Monocyte Absolute 0.52 0.10 - 1.00 x10(3) uL    Eosinophil Absolute 0.13 0.00 - 0.70 x10(3) uL    Basophil Absolute 0.06 0.00 - 0.20 x10(3) uL    Immature Granulocyte Absolute 0.02 0.00 - 1.00 x10(3) uL    Neutrophil % 71.8 %    Lymphocyte % 18.2 %    Monocyte % 7.1 %    Eosinophil % 1.8 %    Basophil % 0.8 %    Immature Granulocyte % 0.3 %   HGB A1C [E]     Collection Time: 07/18/24  1:45 PM   Result Value Ref Range    HgbA1C 6.8 (H) <5.7 %    Estimated Average Glucose 148 (H) 68 - 126 mg/dL      Latest Reference Range & Units 01/19/24 12:49 04/18/24 13:27 07/18/24 13:45   IMMUNOGLOBULIN G 650 - 1,600 mg/dL 297 (L) 262 (L) 217 (L)   (L): Data is abnormally low    Radiology:  PROCEDURE:  US SCROTUM W/ DOPPLER (CPT=93975/44153)     COMPARISON:  None.     INDICATIONS:  N50.89 Testicle swelling N50.811 Pain in right testicle     TECHNIQUE:  Real time grey scale ultrasound was performed of the scrotal contents including the testicles, epididymis, spermatic cord, and scrotal wall. A duplex scan with B-mode, Doppler color flow, and spectral analysis were also performed.     PATIENT STATED HISTORY: (As transcribed by Technologist)  Patient c/o left scrotal swelling.         FINDINGS:    TESTES:  The right testicle is located superiorly to the scrotal sac within the right inguinal region.  No focal testicular lesions on the right or left noted.  There are however multiple small nonspecific parenchymal calcifications.  EPIDIDYMIS:  Negative.  HYDROCELE:  Moderate bilateral hydroceles.  VARICOCELE:  Negative  OTHER:  Negative.                   Impression   CONCLUSION:    1. Moderate bilateral hydroceles.  2. The right testicle is superiorly displaced within the right inguinal canal, superior to the scrotal sac.        LOCATION:  [facility]           Dictated by (CST): Polo Lim DO on 1/26/2024 at 4:44 PM      Finalized by (CST): Polo Lim DO on 1/26/2024 at 4:47 PM         PROCEDURE:  US ABDOMEN COMPLETE (CPT=76700)     COMPARISON:  None.     INDICATIONS:  abnormal labs     TECHNIQUE:  Real time gray-scale ultrasound was used to evaluate the abdomen.  The exam includes images of the liver, gallbladder, common bile duct, pancreas, spleen, kidneys, IVC, and aorta.     PATIENT STATED HISTORY: (As transcribed by Technologist)           FINDINGS:    LIVER:  There is mild  increased echogenicity of the liver which may represent fatty infiltration or hepatocellular disease.  Please note that this limits sensitivity for a focal hepatic lesion.  BILIARY:  No gallstones or pericholecystic fluid is seen.  No gallbladder wall thickening.  Low level echogenicity within the gallbladder likely represents sludge.  Common bile duct diameter is 5 mm.  PANCREAS:  The pancreas is not well seen due to overlying bowel gas.  SPLEEN:  Spleen measures up to 13 cm in length.  KIDNEYS:  Right kidney measures 12 cm.  Left kidney measures 11.9 cm.  AORTA/IVC:  Normal.                   Impression   CONCLUSION:  There is mild increased echogenicity of the liver which may represent fatty infiltration or hepatocellular disease.  Please note that this limits sensitivity for a focal hepatic lesion.     Gallbladder sludge is present.  No evidence of pericholecystic fluid or gallbladder wall thickening.        LOCATION:  [id number]           Dictated by (CST): Hitesh Aburto MD on 9/14/2023 at 6:47 PM      Finalized by (CST): Hitesh Aburto MD on 9/14/2023 at 6:50 PM         Narrative   PROCEDURE:  XR LUMBAR SPINE (MIN 2 VIEWS) (CPT=72100)     LOCATION:  [facility]       TECHNIQUE:  AP, lateral, and coned down L5-S1 views were obtained.     COMPARISON:  None.     INDICATIONS:  M54.40 Low back pain with sciatica, sciatica laterality unspecified, unspecified back pain laterality, unspecified chronicity     PATIENT STATED HISTORY: (As transcribed by Technologist)  Patient is having an orthopedic evaluation.  Patient stated he has a slap lesion in his Right shoulder.  He stated it started hurting 1 week ago.  Patient is also having sever low back pain.           FINDINGS:    Trace right convex curvature of the lumbar spine.  Moderate facet arthropathy is identified at L4-5 and L5-S1.  Extensive atherosclerosis.  Mild to moderate endplate degenerative changes.  The vertebral body heights and disc heights are preserved.  No  acute  displaced osseous fracture.                   Impression   CONCLUSION:  Mild to moderate degenerative changes in the lumbar spine.        Dictated by (CST): Stromberg, LeRoy, MD on 1/12/2023 at 12:55 PM      Finalized by (CST): Stromberg, LeRoy, MD on 1/12/2023 at 12:57 PM       PROCEDURE:  CT CHEST+ABDOMEN+PELVIS(ALL CNTRST ONLY)(CPT=71260/73590)     COMPARISON:  [facility] , CT CHEST+ABDOMEN+PELVIS(ALL CNTRST ONLY)(CPT=71260/81597), 6/24/2016, 7:40.  [facility], CT ABDOMEN+PELVIS(ALL W+WO)(CPT=74178), 5/04/2019, 7:06.     INDICATIONS:  CLL, elevated transaminases, fever     TECHNIQUE:  IV contrast-enhanced scanning through the chest, abdomen, and pelvis was performed.  Dose reduction techniques were used. Dose information is transmitted to the ACR (American College of Radiology) NRDR (National Radiology Data Registry) which   includes the Dose Index Registry.     PATIENT STATED HISTORY:(As transcribed by Technologist)  Inpatient.  Patient presents with fever, bodyache and back pain.  Hx CLL      CONTRAST USED:  100cc of Omnipaque 350     FINDINGS:    CHEST:       LUNGS/PLEURA:  New bilateral posterior lower lobe atelectasis, greater on the left, without pleural effusion.  Stable 5 mm right lower lobe pulmonary nodule, no change since June 2016, more than 3 years, benign     THORACIC AORTA:  No aneurysm or other acute process     MEDIASTINUM/AURA:  Stable right hilar lymph node 17 x 18 mm size..     CARDIAC:  Unremarkable.     CHEST WALL:  Unremarkable.     OTHER:  Bilateral axillary lymph nodes have decreased considerably in size, with the largest lymph node now measuring 9 mm in greatest short axis dimension, image 48 in the left axilla, all other lymph nodes are smaller, with previous size measurements   up to 3.4 cm on the right, and up to 2.4 cm on the left..     ABDOMEN/PELVIS:     LIVER:  Unremarkable.      BILIARY:  Unremarkable.     PANCREAS:  Unremarkable.     SPLEEN:  The spleen has increased in size since  the prior CT scan from May 2019, 17.2 cm cephalocaudal dimension, previously 13.1 cm.  It measures  15.3 cm in AP dimension, previously 13.3 cm.  Splenic attenuation is homogeneous.      KIDNEYS:  No acute abnormality.     ADRENALS:  Unremarkable.     AORTA/VASCULAR:  No aortic aneurysm.      RETROPERITONEUM:  A previously measured 12 x 12 mm left para-aortic retroperitoneal lymph node has decreased in size, now measuring about 8-9 mm.  Other retroperitoneal lymph nodes are sub cm, none showing any increase in size.  There are numerous   mesenteric lymph nodes as well subcentimeter, and stable when measured in short axis dimension. .     BOWEL/MESENTERY:  No acute process. No evidence for small bowel obstruction, with administered oral contrast passing through the small bowel, and opacifying the colon by the time of the study. No sign of dilation of the small bowel.  No ascites, free   air, colitis, diverticulitis or excessive stool.  Appendix is visualized, normal.        ABDOMINAL WALL:  Unremarkable.     URINARY BLADDER:  Unremarkable.      PELVIC NODES:  Unremarkable.     PELVIC ORGANS:  No acute process.      OTHER:  None.     SKELETAL STRUCTURES IN THE CHEST/ABDOMEN/PELVIS:     BONES:  No acute abnormality.         =====  CONCLUSION:       1. Development of splenomegaly, with obvious change in the volume of the spleen when compared with a relatively recent CT scan of the abdomen from May 2019. Measurements are given above, including cephalocaudal dimension of 17.2 cm, previously 13.1 cm.    Enhancement of the spleen is homogeneous without sign of mass, or nodule, and there is no perisplenic fluid.     2. Decrease in the size of previously clearly enlarged axillary lymph nodes, now normal size, the largest measuring 9 mm left axilla.  Stable right hilar lymph node mildly enlarged.     3. Decrease in the size of retroperitoneal lymph node.  Numerous subcentimeter stable appearing retroperitoneal and mesenteric  lymph nodes.     4. No bowel obstruction, ascites, free air, abscess, colitis, diverticulitis or other acute process.          Dictated by: Jacek Manzano MD on 9/06/2019 at 9:23       Approved by: Jacek Manzano MD             Impression and Plan:  1. CLL- switched to [place] due to iritis/uveitis. So far tolerating with manageable side effects. on Ibrutinib and tolerating ok.  WBC/lymphs, Hgb normal! Platelets mildly low on [place] which is not unusual. No bleeding. Continued monitoring for possible AEs.     2. Increased creatinine- Back to normal    3. Elevated LFTs/pancreatitis-   had bout of pancreatitis from alcohol. Advised moderation if not stop altogether. He says what happened was not typical for him but was at school reunion. LFTs have been good lately including today    4. Hypogammaglobulinemia- was on monthly IVIG and tolerated this well. As has not had recent severe infections for some time started spaced out infusions to Q 8-> 12 weeks--> 6 months.  He is aware of signs and symptoms to watch out for.     5. 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 I had discussed this with him at length. He initially did not want to switch as was worried about cost issues. He has since had another episode and agreed to switch.  Work-up for possible  autoimmune disorders and other potential etiology negative. Continues to be followed by ophth.       Labs reviewed with him    Continue [place] and IVIG    RTC 3 months. IVIG Q 6 months    Risk level high- CLL on targeted therapy and IVIG    Emotional Well Being:  I have assessed the patient's emotional well-being and any concerns about anxiety or depression.  We discussed issues of distress, coping difficulties and social support systems and currently there are no active problems.      Olivia Landeros MD  [facility]

## 2024-07-26 DIAGNOSIS — E78.2 MIXED HYPERLIPIDEMIA: ICD-10-CM

## 2024-07-26 DIAGNOSIS — I10 ESSENTIAL HYPERTENSION: Primary | ICD-10-CM

## 2024-07-26 DIAGNOSIS — E11.9 TYPE 2 DIABETES MELLITUS WITHOUT COMPLICATION, WITHOUT LONG-TERM CURRENT USE OF INSULIN (HCC): ICD-10-CM

## 2024-07-26 DIAGNOSIS — J45.30 MILD PERSISTENT ASTHMA WITHOUT COMPLICATION (HCC): ICD-10-CM

## 2024-07-26 RX ORDER — ALBUTEROL SULFATE 90 UG/1
AEROSOL, METERED RESPIRATORY (INHALATION)
Qty: 8.5 G | Refills: 0 | Status: SHIPPED | OUTPATIENT
Start: 2024-07-26

## 2024-07-26 RX ORDER — ALBUTEROL SULFATE 90 UG/1
AEROSOL, METERED RESPIRATORY (INHALATION)
Qty: 8.5 G | Refills: 2 | OUTPATIENT
Start: 2024-07-26

## 2024-07-26 NOTE — TELEPHONE ENCOUNTER
Last time medication was refilled 4/29/22  Last office visit  3/8/24  Next office visit due   Future Appointments   Date Time Provider Department Center   9/13/2024 12:15 PM Serafin Jack MD [facility] [address]   10/18/2024  8:00 AM  [place] RN7  [facility]   10/18/2024 10:15 AM Olivia Landeros MD  [facility]     Passed protocol, Medication sent.        The original prescription was discontinued on 4/29/2022 by Teresa Zamorano for the following reason: Therapy completed. Renewing this prescription may not be appropriate.    Per LB Calderon ok to refill

## 2024-08-07 ENCOUNTER — MED REC SCAN ONLY (OUTPATIENT)
Dept: INTERNAL MEDICINE CLINIC | Facility: CLINIC | Age: 59
End: 2024-08-07

## 2024-08-13 DIAGNOSIS — C91.10 CLL (CHRONIC LYMPHOCYTIC LEUKEMIA) (HCC): ICD-10-CM

## 2024-08-13 RX ORDER — FLUCONAZOLE 100 MG/1
100 TABLET ORAL DAILY
Qty: 30 TABLET | Refills: 5 | Status: SHIPPED | OUTPATIENT
Start: 2024-08-13

## 2024-08-31 ENCOUNTER — LAB ENCOUNTER (OUTPATIENT)
Dept: LAB | Age: 59
End: 2024-08-31
Attending: INTERNAL MEDICINE
Payer: COMMERCIAL

## 2024-08-31 DIAGNOSIS — I10 ESSENTIAL HYPERTENSION: ICD-10-CM

## 2024-08-31 DIAGNOSIS — E11.9 TYPE 2 DIABETES MELLITUS WITHOUT COMPLICATION, WITHOUT LONG-TERM CURRENT USE OF INSULIN (HCC): ICD-10-CM

## 2024-08-31 DIAGNOSIS — E78.2 MIXED HYPERLIPIDEMIA: ICD-10-CM

## 2024-08-31 LAB
ALBUMIN SERPL-MCNC: 4.9 G/DL (ref 3.2–4.8)
ALBUMIN/GLOB SERPL: 3.5 {RATIO} (ref 1–2)
ALP LIVER SERPL-CCNC: 55 U/L
ALT SERPL-CCNC: 45 U/L
ANION GAP SERPL CALC-SCNC: 3 MMOL/L (ref 0–18)
AST SERPL-CCNC: 31 U/L (ref ?–34)
BASOPHILS # BLD AUTO: 0.05 X10(3) UL (ref 0–0.2)
BASOPHILS NFR BLD AUTO: 0.9 %
BILIRUB SERPL-MCNC: 0.4 MG/DL (ref 0.3–1.2)
BILIRUB UR QL STRIP.AUTO: NEGATIVE
BUN BLD-MCNC: 12 MG/DL (ref 9–23)
CALCIUM BLD-MCNC: 10.1 MG/DL (ref 8.7–10.4)
CHLORIDE SERPL-SCNC: 103 MMOL/L (ref 98–112)
CHOLEST SERPL-MCNC: 109 MG/DL (ref ?–200)
CLARITY UR REFRACT.AUTO: CLEAR
CO2 SERPL-SCNC: 31 MMOL/L (ref 21–32)
COLOR UR AUTO: COLORLESS
CREAT BLD-MCNC: 0.95 MG/DL
EGFRCR SERPLBLD CKD-EPI 2021: 92 ML/MIN/1.73M2 (ref 60–?)
EOSINOPHIL # BLD AUTO: 0.12 X10(3) UL (ref 0–0.7)
EOSINOPHIL NFR BLD AUTO: 2.1 %
ERYTHROCYTE [DISTWIDTH] IN BLOOD BY AUTOMATED COUNT: 11.6 %
EST. AVERAGE GLUCOSE BLD GHB EST-MCNC: 137 MG/DL (ref 68–126)
FASTING PATIENT LIPID ANSWER: YES
FASTING STATUS PATIENT QL REPORTED: YES
GLOBULIN PLAS-MCNC: 1.4 G/DL (ref 2–3.5)
GLUCOSE BLD-MCNC: 157 MG/DL (ref 70–99)
GLUCOSE UR STRIP.AUTO-MCNC: 500 MG/DL
HBA1C MFR BLD: 6.4 % (ref ?–5.7)
HCT VFR BLD AUTO: 46.1 %
HDLC SERPL-MCNC: 29 MG/DL (ref 40–59)
HGB BLD-MCNC: 15.3 G/DL
IMM GRANULOCYTES # BLD AUTO: 0.02 X10(3) UL (ref 0–1)
IMM GRANULOCYTES NFR BLD: 0.3 %
KETONES UR STRIP.AUTO-MCNC: NEGATIVE MG/DL
LDLC SERPL CALC-MCNC: 52 MG/DL (ref ?–100)
LEUKOCYTE ESTERASE UR QL STRIP.AUTO: NEGATIVE
LYMPHOCYTES # BLD AUTO: 1.33 X10(3) UL (ref 1–4)
LYMPHOCYTES NFR BLD AUTO: 22.8 %
MCH RBC QN AUTO: 31.3 PG (ref 26–34)
MCHC RBC AUTO-ENTMCNC: 33.2 G/DL (ref 31–37)
MCV RBC AUTO: 94.3 FL
MONOCYTES # BLD AUTO: 0.5 X10(3) UL (ref 0.1–1)
MONOCYTES NFR BLD AUTO: 8.6 %
NEUTROPHILS # BLD AUTO: 3.82 X10 (3) UL (ref 1.5–7.7)
NEUTROPHILS # BLD AUTO: 3.82 X10(3) UL (ref 1.5–7.7)
NEUTROPHILS NFR BLD AUTO: 65.3 %
NITRITE UR QL STRIP.AUTO: NEGATIVE
NONHDLC SERPL-MCNC: 80 MG/DL (ref ?–130)
OSMOLALITY SERPL CALC.SUM OF ELEC: 287 MOSM/KG (ref 275–295)
PH UR STRIP.AUTO: 6 [PH] (ref 5–8)
PLATELET # BLD AUTO: 135 10(3)UL (ref 150–450)
PLATELETS.RETICULATED NFR BLD AUTO: 6.9 % (ref 0–7)
POTASSIUM SERPL-SCNC: 4.5 MMOL/L (ref 3.5–5.1)
PROT SERPL-MCNC: 6.3 G/DL (ref 5.7–8.2)
PROT UR STRIP.AUTO-MCNC: NEGATIVE MG/DL
RBC # BLD AUTO: 4.89 X10(6)UL
RBC UR QL AUTO: NEGATIVE
SODIUM SERPL-SCNC: 137 MMOL/L (ref 136–145)
SP GR UR STRIP.AUTO: <1.005 (ref 1–1.03)
TRIGL SERPL-MCNC: 168 MG/DL (ref 30–149)
TSI SER-ACNC: 2.52 MIU/ML (ref 0.55–4.78)
UROBILINOGEN UR STRIP.AUTO-MCNC: NORMAL MG/DL
VLDLC SERPL CALC-MCNC: 24 MG/DL (ref 0–30)
WBC # BLD AUTO: 5.8 X10(3) UL (ref 4–11)

## 2024-08-31 PROCEDURE — 85025 COMPLETE CBC W/AUTO DIFF WBC: CPT

## 2024-08-31 PROCEDURE — 36415 COLL VENOUS BLD VENIPUNCTURE: CPT

## 2024-08-31 PROCEDURE — 84443 ASSAY THYROID STIM HORMONE: CPT

## 2024-08-31 PROCEDURE — 80061 LIPID PANEL: CPT

## 2024-08-31 PROCEDURE — 83036 HEMOGLOBIN GLYCOSYLATED A1C: CPT

## 2024-08-31 PROCEDURE — 81003 URINALYSIS AUTO W/O SCOPE: CPT

## 2024-08-31 PROCEDURE — 80053 COMPREHEN METABOLIC PANEL: CPT

## 2024-09-05 DIAGNOSIS — E11.9 TYPE 2 DIABETES MELLITUS WITHOUT COMPLICATION, WITHOUT LONG-TERM CURRENT USE OF INSULIN (HCC): ICD-10-CM

## 2024-09-05 DIAGNOSIS — E78.2 MIXED HYPERLIPIDEMIA: ICD-10-CM

## 2024-09-05 RX ORDER — ATORVASTATIN CALCIUM 40 MG/1
TABLET, FILM COATED ORAL
Qty: 90 TABLET | Refills: 0 | Status: SHIPPED | OUTPATIENT
Start: 2024-09-05

## 2024-09-05 RX ORDER — GLIMEPIRIDE 4 MG/1
4 TABLET ORAL 2 TIMES DAILY WITH MEALS
Qty: 180 TABLET | Refills: 0 | Status: SHIPPED | OUTPATIENT
Start: 2024-09-05

## 2024-09-05 NOTE — TELEPHONE ENCOUNTER
Atorvastatin 40 MG    Last time medication was refilled 09/26/2023  Last office visit  03/08/2024  Next office visit due/scheduled   Future Appointments   Date Time Provider Department Center   9/13/2024 12:15 PM Serafin Jack MD [facility]   10/18/2024  8:00 AM  [place] RN7  [facility]   10/18/2024 10:15 AM Olivia Landeros MD  [facility]       Glimepiride 4 MG  Last time medication was refilled 03/24/2024  Last office visit  03/08/2024  Next office visit due/scheduled   Future Appointments   Date Time Provider Department Center   9/13/2024 12:15 PM Serafin Jack MD [facility]   10/18/2024  8:00 AM  [place] RN7  [facility]   10/18/2024 10:15 AM Olivia Landeros MD  [facility]       Passed protocol, Medication sent.

## 2024-09-13 ENCOUNTER — OFFICE VISIT (OUTPATIENT)
Dept: INTERNAL MEDICINE CLINIC | Facility: CLINIC | Age: 59
End: 2024-09-13
Payer: COMMERCIAL

## 2024-09-13 VITALS
OXYGEN SATURATION: 99 % | HEIGHT: 74 IN | RESPIRATION RATE: 16 BRPM | WEIGHT: 218 LBS | BODY MASS INDEX: 27.98 KG/M2 | HEART RATE: 81 BPM | SYSTOLIC BLOOD PRESSURE: 124 MMHG | TEMPERATURE: 97 F | DIASTOLIC BLOOD PRESSURE: 70 MMHG

## 2024-09-13 DIAGNOSIS — J45.40 MODERATE PERSISTENT ASTHMA WITHOUT COMPLICATION (HCC): ICD-10-CM

## 2024-09-13 DIAGNOSIS — E11.8 DIABETES MELLITUS WITH COMPLICATION (HCC): Primary | ICD-10-CM

## 2024-09-13 DIAGNOSIS — I10 ESSENTIAL HYPERTENSION: ICD-10-CM

## 2024-09-13 DIAGNOSIS — Z12.11 COLON CANCER SCREENING: ICD-10-CM

## 2024-09-13 DIAGNOSIS — E78.2 MIXED HYPERLIPIDEMIA: ICD-10-CM

## 2024-09-13 PROCEDURE — 3044F HG A1C LEVEL LT 7.0%: CPT | Performed by: INTERNAL MEDICINE

## 2024-09-13 PROCEDURE — 3008F BODY MASS INDEX DOCD: CPT | Performed by: INTERNAL MEDICINE

## 2024-09-13 PROCEDURE — 3078F DIAST BP <80 MM HG: CPT | Performed by: INTERNAL MEDICINE

## 2024-09-13 PROCEDURE — 3074F SYST BP LT 130 MM HG: CPT | Performed by: INTERNAL MEDICINE

## 2024-09-13 PROCEDURE — 99214 OFFICE O/P EST MOD 30 MIN: CPT | Performed by: INTERNAL MEDICINE

## 2024-09-13 RX ORDER — FLUTICASONE PROPIONATE 250 UG/1
1 POWDER, METERED RESPIRATORY (INHALATION) 2 TIMES DAILY
Qty: 180 EACH | Refills: 3 | Status: SHIPPED | OUTPATIENT
Start: 2024-09-13

## 2024-09-13 RX ORDER — MAGNESIUM 200 MG
200 TABLET ORAL DAILY
COMMUNITY

## 2024-09-13 NOTE — PROGRESS NOTES
Subjective:   Patient ID: Deniz Aguilera is a 59 year old male.    HPI  HPI:   Deniz Aguilera is a 59 year old male who presents for recheck of his diabetes, HTN and HLD. Patient’s FBS have been stable. .  Pt has been checking his feet on a regular basis. Pt denies any tingling of the feet. Pt denies any issues with depression. Pt complains of nothing  Denies hypoglycemia  No cp or sob.    Wt Readings from Last 6 Encounters:   09/13/24 218 lb (98.9 kg)   07/18/24 226 lb (102.5 kg)   04/18/24 222 lb 9.6 oz (101 kg)   03/08/24 230 lb (104.3 kg)   01/19/24 226 lb (102.5 kg)   10/20/23 225 lb (102.1 kg)     Body mass index is 27.99 kg/m².     Lab Results   Component Value Date    A1C 6.4 (H) 08/31/2024    A1C 6.8 (H) 07/18/2024    A1C 7.7 (H) 02/03/2024     Lab Results   Component Value Date    CHOLEST 109 08/31/2024    CHOLEST 136 02/03/2024    CHOLEST 122 07/28/2023     Lab Results   Component Value Date    HDL 29 (L) 08/31/2024    HDL 46 02/03/2024    HDL 41 07/28/2023     Lab Results   Component Value Date    LDL 52 08/31/2024    LDL 65 02/03/2024    LDL 48 07/28/2023     Lab Results   Component Value Date    TRIG 168 (H) 08/31/2024    TRIG 142 02/03/2024    TRIG 202 (H) 07/28/2023     Lab Results   Component Value Date    AST 31 08/31/2024    AST 27 07/18/2024    AST 23 04/18/2024     Lab Results   Component Value Date    ALT 45 08/31/2024    ALT 25 07/18/2024    ALT 31 04/18/2024     Malb/Cre Calc   Date Value Ref Range Status   02/03/2024 33.8 (H) <=30.0 ug/mg Final     Comment:     <30 ug/mg creatinine       Normal     ug/mg creatinine   Microalbuminuria   >300 ug/mg creatinine      Albuminuria       07/28/2023 22.9 <=30.0 ug/mg Final     Comment:     <30 ug/mg creatinine       Normal     ug/mg creatinine   Microalbuminuria   >300 ug/mg creatinine      Albuminuria       06/04/2022 53.2 (H) <=30.0 ug/mg Final     Comment:     <30 ug/mg creatinine       Normal     ug/mg creatinine   Microalbuminuria    >300 ug/mg creatinine      Albuminuria           Current Outpatient Medications   Medication Sig Dispense Refill    Magnesium 200 MG Oral Tab Take 1 tablet (200 mg total) by mouth daily.      atorvastatin 40 MG Oral Tab TAKE 1 TABLET DAILY 90 tablet 0    glimepiride 4 MG Oral Tab TAKE 1 TABLET TWICE A DAY  WITH MEALS 180 tablet 0    FLUCONAZOLE 100 MG Oral Tab TAKE 1 TABLET DAILY 30 tablet 5    METFORMIN 850 MG Oral Tab TAKE 1 TABLET TWICE A  tablet 0    Acalabrutinib Maleate 100 MG Oral Tab Take 100 mg by mouth 2 (two) times daily. Take 1 tablet (100 mg) twice daily, approximately 12 hours apart, with water.  May be taken with or without food. 60 tablet 11    FARXIGA 10 MG Oral Tab TAKE 1 TABLET DAILY 90 tablet 1    ondansetron (ZOFRAN) 8 MG tablet Take 1 tablet (8 mg total) by mouth every 8 (eight) hours as needed for Nausea. 30 tablet 3    Sildenafil Citrate 100 MG Oral Tab TAKE 1 TABLET DAILY AS NEEDED FOR ERECTILE DYSFUNCTION. MAX DOSE 100 MG IN 24 HOURS 30 tablet 3    Fluticasone Propionate, Inhal, (FLOVENT DISKUS) 250 MCG/ACT Inhalation Aerosol Powder, Breath Activated USE 1 INHALATION TWICE A   DAY 3 each 3    acyclovir 400 MG Oral Tab Take 1 tablet (400 mg total) by mouth 2 (two) times daily. 180 tablet 3    immune globulin, human, (GAMMAGARD) 20 g/200mL Injection Solution Inject every 4 weeks 20 g 11    B Complex-C-Folic Acid (VITALINE BIOTIN FORTE) 0.8 MG Oral Tab Take by mouth.      Prochlorperazine Maleate (COMPAZINE) 10 mg tablet TAKE 1 TABLET BY MOUTH EVERY SIX HOURS AS NEEDED FOR NAUSEA 30 tablet 3    Cyanocobalamin (VITAMIN B 12 OR) Take by mouth.      aspirin 81 MG Oral Tab Take 1 tablet (81 mg total) by mouth daily.  0    Fexofenadine HCl (WAL-FEX ALLERGY) 180 MG Oral Tab Take 1 tablet by mouth once daily. 90 tablet 1    Multiple Vitamins-Minerals (CENTRUM SILVER ULTRA MENS) Oral Tab Take  by mouth.      albuterol (PROAIR HFA) 108 (90 Base) MCG/ACT Inhalation Aero Soln INHALE 2 PUFFS BY  MOUTH EVERY SIX HOURS AS NEEDED for wheezing (Patient not taking: Reported on 9/13/2024) 8.5 g 0    BENAZEPRIL HCL 10 MG Oral Tab TAKE 1 TABLET DAILY (Patient not taking: Reported on 9/13/2024) 90 tablet 1      Past Medical History:    Back pain    Blood in urine    Cancer (HCC)    CLL-monitored by Dr. Landeros    Decorative tattoo    Diarrhea, unspecified    Essential hypertension, benign    Controlled     Extrinsic asthma, unspecified    High cholesterol    Kidney stone    Laboratory examination ordered as part of a routine general medical examination    Type II or unspecified type diabetes mellitus without mention of complication, not stated as uncontrolled    Unspecified essential hypertension    Visual impairment    glasses    Wears glasses      Past Surgical History:   Procedure Laterality Date    Colonoscopy      Colonoscopy      Other  05/27/2016    cervical lad      Social History:   Social History     Socioeconomic History    Marital status:    Tobacco Use    Smoking status: Every Day     Types: Pipe    Smokeless tobacco: Never    Tobacco comments:     pipes and cigars   Vaping Use    Vaping status: Never Used   Substance and Sexual Activity    Alcohol use: Yes     Alcohol/week: 4.0 - 6.0 standard drinks of alcohol     Types: 4 - 6 Cans of beer per week     Comment: 3-4  Beers/week    Drug use: No   Other Topics Concern    Caffeine Concern Yes     Comment: 32-40 oz per day/coffee/tea    Exercise No     Exercise: once per week,  twice per week.  Diet: watches fats closely and watches sugar closely     REVIEW OF SYSTEMS:   GENERAL HEALTH: feels well otherwise  SKIN: denies any unusual skin lesions or rashes  RESPIRATORY: denies shortness of breath with exertion  CARDIOVASCULAR: denies chest pain on exertion  GI: denies abdominal pain and denies heartburn  NEURO: denies headaches    EXAM:   /70   Pulse 81   Temp 97 °F (36.1 °C)   Resp 16   Ht 6' 2\" (1.88 m)   Wt 218 lb (98.9 kg)   SpO2 99%    BMI 27.99 kg/m²   GENERAL: well developed, well nourished,in no apparent distress  SKIN: no rashes,no suspicious lesions  NECK: supple,no adenopathy,no bruits  LUNGS: clear to auscultation  CARDIO: RRR without murmur  GI: good BS's,no masses, HSM or tenderness  EXTREMITIES: no cyanosis, clubbing or edema  NEURO: Bilateral barefoot skin diabetic exam is normal, visualized feet and the appearance is normal.  Bilateral monofilament/sensation of both feet is normal.  Pulsation pedal pulse exam of both lower legs/feet is normal as well.        ASSESSMENT AND PLAN:   Deniz Aguilera is a 59 year old male who presents for a recheck of his diabetes, HTN and HLD  Diabetic control is at goal  HTN -controlled. Cont current med therapy  HLD-controlled. Cont current med therapy  .  Recommendations are: continue present meds, check HgbA1C,  fasting lipids and CMP, lose wgt with carbohydrate controlled diet and exercise,   , check feet daily.  The patient indicates understanding of these issues and agrees to the plan.  The patient is asked to return in 6 m.    History/Other:   Review of Systems  Current Outpatient Medications   Medication Sig Dispense Refill    Magnesium 200 MG Oral Tab Take 1 tablet (200 mg total) by mouth daily.      atorvastatin 40 MG Oral Tab TAKE 1 TABLET DAILY 90 tablet 0    glimepiride 4 MG Oral Tab TAKE 1 TABLET TWICE A DAY  WITH MEALS 180 tablet 0    FLUCONAZOLE 100 MG Oral Tab TAKE 1 TABLET DAILY 30 tablet 5    METFORMIN 850 MG Oral Tab TAKE 1 TABLET TWICE A  tablet 0    Acalabrutinib Maleate 100 MG Oral Tab Take 100 mg by mouth 2 (two) times daily. Take 1 tablet (100 mg) twice daily, approximately 12 hours apart, with water.  May be taken with or without food. 60 tablet 11    FARXIGA 10 MG Oral Tab TAKE 1 TABLET DAILY 90 tablet 1    ondansetron (ZOFRAN) 8 MG tablet Take 1 tablet (8 mg total) by mouth every 8 (eight) hours as needed for Nausea. 30 tablet 3    Sildenafil Citrate 100 MG Oral Tab TAKE  1 TABLET DAILY AS NEEDED FOR ERECTILE DYSFUNCTION. MAX DOSE 100 MG IN 24 HOURS 30 tablet 3    Fluticasone Propionate, Inhal, (FLOVENT DISKUS) 250 MCG/ACT Inhalation Aerosol Powder, Breath Activated USE 1 INHALATION TWICE A   DAY 3 each 3    acyclovir 400 MG Oral Tab Take 1 tablet (400 mg total) by mouth 2 (two) times daily. 180 tablet 3    immune globulin, human, (GAMMAGARD) 20 g/200mL Injection Solution Inject every 4 weeks 20 g 11    B Complex-C-Folic Acid (VITALINE BIOTIN FORTE) 0.8 MG Oral Tab Take by mouth.      Prochlorperazine Maleate (COMPAZINE) 10 mg tablet TAKE 1 TABLET BY MOUTH EVERY SIX HOURS AS NEEDED FOR NAUSEA 30 tablet 3    Cyanocobalamin (VITAMIN B 12 OR) Take by mouth.      aspirin 81 MG Oral Tab Take 1 tablet (81 mg total) by mouth daily.  0    Fexofenadine HCl (WAL-FEX ALLERGY) 180 MG Oral Tab Take 1 tablet by mouth once daily. 90 tablet 1    Multiple Vitamins-Minerals (CENTRUM SILVER ULTRA MENS) Oral Tab Take  by mouth.      albuterol (PROAIR HFA) 108 (90 Base) MCG/ACT Inhalation Aero Soln INHALE 2 PUFFS BY MOUTH EVERY SIX HOURS AS NEEDED for wheezing (Patient not taking: Reported on 9/13/2024) 8.5 g 0    BENAZEPRIL HCL 10 MG Oral Tab TAKE 1 TABLET DAILY (Patient not taking: Reported on 9/13/2024) 90 tablet 1     Allergies:  Allergies   Allergen Reactions    Codeine OTHER (SEE COMMENTS)     nausea    Dust SHORTNESS OF BREATH    Mold SHORTNESS OF BREATH    Pollen SHORTNESS OF BREATH       Objective:   Physical Exam    Assessment & Plan:   1. Diabetes mellitus with complication (HCC)    2. Essential hypertension    3. Mixed hyperlipidemia    4. Colon cancer screening        Orders Placed This Encounter   Procedures    Occult Blood, Fecal, Immunoassay (Blue cards) [E]       Meds This Visit:  Requested Prescriptions      No prescriptions requested or ordered in this encounter       Imaging & Referrals:  None

## 2024-09-13 NOTE — TELEPHONE ENCOUNTER
Last time medication was refilled: 11/25/23  Next office visit due/scheduled: 9/13/24  Last office visit: 3/8/24  Last Labs: 8/31/24

## 2024-09-24 DIAGNOSIS — D80.1 HYPOGAMMAGLOBULINEMIA (HCC): ICD-10-CM

## 2024-09-24 DIAGNOSIS — C91.10 CLL (CHRONIC LYMPHOCYTIC LEUKEMIA) (HCC): ICD-10-CM

## 2024-09-24 RX ORDER — IMMUNE GLOBULIN INFUSION (HUMAN) 100 MG/ML
INJECTION, SOLUTION INTRAVENOUS; SUBCUTANEOUS
Qty: 20 G | Refills: 2 | Status: SHIPPED | OUTPATIENT
Start: 2024-09-24

## 2024-09-25 ENCOUNTER — TELEPHONE (OUTPATIENT)
Dept: HEMATOLOGY/ONCOLOGY | Facility: HOSPITAL | Age: 59
End: 2024-09-25

## 2024-09-25 NOTE — TELEPHONE ENCOUNTER
Spoke with patient to alert him that a new script for Gammagard was sent to [facility] pharmacy that will require him to give consent to ship for his upcoming apt on 10/18.     Patient states he is under the impression that [facility] has already shipped for this infusion date and he has already been charged. Message was sent to pharmacy staff to confirm if drug has arrived or not. Will follow up with patient with pharmacy reply.

## 2024-09-30 NOTE — TELEPHONE ENCOUNTER
Spoke with patient also who states [facility] informed him it would be delivered 10/1. Pharmacy aware, pt will keep 10/18 apt as scheduled.

## 2024-10-18 ENCOUNTER — OFFICE VISIT (OUTPATIENT)
Dept: HEMATOLOGY/ONCOLOGY | Facility: HOSPITAL | Age: 59
End: 2024-10-18
Attending: INTERNAL MEDICINE
Payer: COMMERCIAL

## 2024-10-18 VITALS
DIASTOLIC BLOOD PRESSURE: 71 MMHG | HEART RATE: 70 BPM | HEIGHT: 74.02 IN | BODY MASS INDEX: 28.36 KG/M2 | OXYGEN SATURATION: 98 % | TEMPERATURE: 98 F | RESPIRATION RATE: 16 BRPM | WEIGHT: 221 LBS | SYSTOLIC BLOOD PRESSURE: 119 MMHG

## 2024-10-18 DIAGNOSIS — T88.7XXA MEDICATION SIDE EFFECTS: ICD-10-CM

## 2024-10-18 DIAGNOSIS — D69.6 THROMBOCYTOPENIA (HCC): ICD-10-CM

## 2024-10-18 DIAGNOSIS — H15.001 SCLERITIS OF RIGHT EYE: ICD-10-CM

## 2024-10-18 DIAGNOSIS — D80.1 HYPOGAMMAGLOBULINEMIA (HCC): ICD-10-CM

## 2024-10-18 DIAGNOSIS — C91.10 CLL (CHRONIC LYMPHOCYTIC LEUKEMIA) (HCC): ICD-10-CM

## 2024-10-18 DIAGNOSIS — H20.9 IRITIS: ICD-10-CM

## 2024-10-18 DIAGNOSIS — C91.10 CLL (CHRONIC LYMPHOCYTIC LEUKEMIA) (HCC): Primary | ICD-10-CM

## 2024-10-18 DIAGNOSIS — R74.01 TRANSAMINITIS: ICD-10-CM

## 2024-10-18 DIAGNOSIS — D80.1 HYPOGAMMAGLOBULINEMIA (HCC): Primary | ICD-10-CM

## 2024-10-18 LAB
ALBUMIN SERPL-MCNC: 5 G/DL (ref 3.2–4.8)
ALBUMIN/GLOB SERPL: 2.6 {RATIO} (ref 1–2)
ALP LIVER SERPL-CCNC: 60 U/L
ALT SERPL-CCNC: 51 U/L
ANION GAP SERPL CALC-SCNC: 7 MMOL/L (ref 0–18)
AST SERPL-CCNC: 40 U/L (ref ?–34)
BASOPHILS # BLD AUTO: 0.07 X10(3) UL (ref 0–0.2)
BASOPHILS NFR BLD AUTO: 1 %
BILIRUB SERPL-MCNC: 0.6 MG/DL (ref 0.3–1.2)
BUN BLD-MCNC: 13 MG/DL (ref 9–23)
CALCIUM BLD-MCNC: 10.1 MG/DL (ref 8.7–10.4)
CHLORIDE SERPL-SCNC: 105 MMOL/L (ref 98–112)
CO2 SERPL-SCNC: 27 MMOL/L (ref 21–32)
CREAT BLD-MCNC: 0.93 MG/DL
EGFRCR SERPLBLD CKD-EPI 2021: 95 ML/MIN/1.73M2 (ref 60–?)
EOSINOPHIL # BLD AUTO: 0.13 X10(3) UL (ref 0–0.7)
EOSINOPHIL NFR BLD AUTO: 1.9 %
ERYTHROCYTE [DISTWIDTH] IN BLOOD BY AUTOMATED COUNT: 12.2 %
GLOBULIN PLAS-MCNC: 1.9 G/DL (ref 2–3.5)
GLUCOSE BLD-MCNC: 158 MG/DL (ref 70–99)
HCT VFR BLD AUTO: 49.7 %
HGB BLD-MCNC: 16.5 G/DL
IMM GRANULOCYTES # BLD AUTO: 0.01 X10(3) UL (ref 0–1)
IMM GRANULOCYTES NFR BLD: 0.1 %
IMMUNOGLOBULIN PNL SER-MCNC: 255 MG/DL (ref 650–1600)
LDH SERPL L TO P-CCNC: 202 U/L
LYMPHOCYTES # BLD AUTO: 1.87 X10(3) UL (ref 1–4)
LYMPHOCYTES NFR BLD AUTO: 26.8 %
MCH RBC QN AUTO: 30.7 PG (ref 26–34)
MCHC RBC AUTO-ENTMCNC: 33.2 G/DL (ref 31–37)
MCV RBC AUTO: 92.6 FL
MONOCYTES # BLD AUTO: 0.59 X10(3) UL (ref 0.1–1)
MONOCYTES NFR BLD AUTO: 8.5 %
NEUTROPHILS # BLD AUTO: 4.3 X10 (3) UL (ref 1.5–7.7)
NEUTROPHILS # BLD AUTO: 4.3 X10(3) UL (ref 1.5–7.7)
NEUTROPHILS NFR BLD AUTO: 61.7 %
OSMOLALITY SERPL CALC.SUM OF ELEC: 291 MOSM/KG (ref 275–295)
PLATELET # BLD AUTO: 127 10(3)UL (ref 150–450)
PLATELETS.RETICULATED NFR BLD AUTO: 7.8 % (ref 0–7)
POTASSIUM SERPL-SCNC: 5.2 MMOL/L (ref 3.5–5.1)
PROT SERPL-MCNC: 6.9 G/DL (ref 5.7–8.2)
RBC # BLD AUTO: 5.37 X10(6)UL
SODIUM SERPL-SCNC: 139 MMOL/L (ref 136–145)
WBC # BLD AUTO: 7 X10(3) UL (ref 4–11)

## 2024-10-18 PROCEDURE — 83615 LACTATE (LD) (LDH) ENZYME: CPT

## 2024-10-18 PROCEDURE — 85025 COMPLETE CBC W/AUTO DIFF WBC: CPT

## 2024-10-18 PROCEDURE — 99215 OFFICE O/P EST HI 40 MIN: CPT | Performed by: INTERNAL MEDICINE

## 2024-10-18 PROCEDURE — 96365 THER/PROPH/DIAG IV INF INIT: CPT

## 2024-10-18 PROCEDURE — 82784 ASSAY IGA/IGD/IGG/IGM EACH: CPT

## 2024-10-18 PROCEDURE — 80053 COMPREHEN METABOLIC PANEL: CPT

## 2024-10-18 PROCEDURE — 96366 THER/PROPH/DIAG IV INF ADDON: CPT

## 2024-10-18 RX ORDER — ACETAMINOPHEN 325 MG/1
650 TABLET ORAL ONCE
Status: COMPLETED | OUTPATIENT
Start: 2024-10-18 | End: 2024-10-18

## 2024-10-18 RX ORDER — DIPHENHYDRAMINE HCL 25 MG
25 CAPSULE ORAL ONCE
Status: COMPLETED | OUTPATIENT
Start: 2024-10-18 | End: 2024-10-18

## 2024-10-18 RX ORDER — DIPHENHYDRAMINE HCL 25 MG
25 CAPSULE ORAL ONCE
OUTPATIENT
Start: 2024-10-18

## 2024-10-18 RX ORDER — ACETAMINOPHEN 325 MG/1
650 TABLET ORAL ONCE
OUTPATIENT
Start: 2024-10-18

## 2024-10-18 RX ADMIN — ACETAMINOPHEN 650 MG: 325 TABLET ORAL at 08:27:00

## 2024-10-18 RX ADMIN — DIPHENHYDRAMINE HCL 25 MG: 25 MG CAPSULE ORAL at 08:28:00

## 2024-10-18 NOTE — PROGRESS NOTES
Cancer Center Progress Note    Patient Name: Deniz Aguilera   YOB: 1965   Medical Record Number: [id number]   CSN: [id number]   Attending Physician: Olivia Landeros M.D.   Referring Physician: Serafin Jack MD      Date of Visit: 10/18/2024     Chief Complaint:  Chief Complaint   Patient presents with    Follow - Up     CLL pt here for f/u and IVIG. He continues on acalabrutinib without difficulty. His only c/o is energy is a little low.         Diagnosis:  CLL (originally diagnosed with stage II CLL at presentation with lymphocytosis with lymphadenopathy) diagnosed 7/2013     - noted increase in neck nodes and underwent excisional biopsy of a right cervical lymph node 5/27/16. Pathology revealed CLL/SLL- no evidence of transformation.     -  del(17p), TP53, IGHV mutation negative         - started Ibrutinib 11/7/18.     Presented to the ED 9/1/19 c/o \"feeling like dirt\" with fever, sore throat, chills, body aches, sinus headaches. No appetite with PO intake poor last few days. CT head showed no acute findings. Felt to have possible viral syndrome with dehydration and was sent home. He then developed diarrhea and took imodium. Has not had a BM last 2 days. Reports intermittent \"bloody boogers\" but no bleeding elsewhere.      Was seen at the Cancer [facility] by one of our APNs and was noted to have thrombocytopenia and elevated transaminases. Was admitted for further w/u and management. Took up to 7 tabs of tylenol one day- not sure of dose. No ETOH lately or illicit drug use. No known h/o hepatitis. Does have fatty liver seen on previous imaging.      Ibrutinib was held and was hydrated. Bacterial cultures and respiratory panel were negative. LDH was elevated at 1050. Viral titers were sent (EBV/HSV and CMV). EBV IgG and nuclear AG ABS positive but IgM negative. IgG levels were 179. Acute hepatitis panel was negative. No significant schistocytes seen on smear.     He was given IVIG for  hypogammaglobulinemia. CT C/A/P showed splenomegaly with decrease in known nodes. Slowly improved and was discharged home 9/7/19.     - Resumed Ibrutinib 10/4/19     - Switched to Acalabrutinib about 6/2024 due to iritis/uveitis      History of Present Illness:  Says he has been doing well. Continues on [place] which he has been tolerating well. No recurrent episodes of iritis/uveitis. No fevers.  Chronic night sweats. Denies SOB or change in urinary habits. No bleeding or palpitations. Appetite good. No B symptoms or rash. Some fatigue. Has been more active and exercising and lost weight (intentionally).       Current Medications:    Current Outpatient Medications:     immune globulin, human, (GAMMAGARD) 20 g/200mL Injection Solution, Inject every 6 months, Disp: 20 g, Rfl: 2    Magnesium 200 MG Oral Tab, Take 1 tablet (200 mg total) by mouth daily., Disp: , Rfl:     FLUTICASONE PROPIONATE DISKUS 250 MCG/ACT Inhalation Aerosol Powder, Breath Activated, USE 1 INHALATION TWICE A   DAY, Disp: 180 each, Rfl: 3    atorvastatin 40 MG Oral Tab, TAKE 1 TABLET DAILY, Disp: 90 tablet, Rfl: 0    glimepiride 4 MG Oral Tab, TAKE 1 TABLET TWICE A DAY  WITH MEALS, Disp: 180 tablet, Rfl: 0    FLUCONAZOLE 100 MG Oral Tab, TAKE 1 TABLET DAILY, Disp: 30 tablet, Rfl: 5    albuterol (PROAIR HFA) 108 (90 Base) MCG/ACT Inhalation Aero Soln, INHALE 2 PUFFS BY MOUTH EVERY SIX HOURS AS NEEDED for wheezing (Patient not taking: Reported on 9/13/2024), Disp: 8.5 g, Rfl: 0    METFORMIN 850 MG Oral Tab, TAKE 1 TABLET TWICE A DAY, Disp: 180 tablet, Rfl: 0    Acalabrutinib Maleate 100 MG Oral Tab, Take 100 mg by mouth 2 (two) times daily. Take 1 tablet (100 mg) twice daily, approximately 12 hours apart, with water.  May be taken with or without food., Disp: 60 tablet, Rfl: 11    FARXIGA 10 MG Oral Tab, TAKE 1 TABLET DAILY, Disp: 90 tablet, Rfl: 1    ondansetron (ZOFRAN) 8 MG tablet, Take 1 tablet (8 mg total) by mouth every 8 (eight) hours as needed  for Nausea., Disp: 30 tablet, Rfl: 3    BENAZEPRIL HCL 10 MG Oral Tab, TAKE 1 TABLET DAILY (Patient not taking: Reported on 9/13/2024), Disp: 90 tablet, Rfl: 1    Sildenafil Citrate 100 MG Oral Tab, TAKE 1 TABLET DAILY AS NEEDED FOR ERECTILE DYSFUNCTION. MAX DOSE 100 MG IN 24 HOURS, Disp: 30 tablet, Rfl: 3    acyclovir 400 MG Oral Tab, Take 1 tablet (400 mg total) by mouth 2 (two) times daily., Disp: 180 tablet, Rfl: 3    B Complex-C-Folic Acid (VITALINE BIOTIN FORTE) 0.8 MG Oral Tab, Take by mouth., Disp: , Rfl:     Prochlorperazine Maleate (COMPAZINE) 10 mg tablet, TAKE 1 TABLET BY MOUTH EVERY SIX HOURS AS NEEDED FOR NAUSEA, Disp: 30 tablet, Rfl: 3    Cyanocobalamin (VITAMIN B 12 OR), Take by mouth., Disp: , Rfl:     aspirin 81 MG Oral Tab, Take 1 tablet (81 mg total) by mouth daily., Disp: , Rfl: 0    Fexofenadine HCl (WAL-FEX ALLERGY) 180 MG Oral Tab, Take 1 tablet by mouth once daily., Disp: 90 tablet, Rfl: 1    Multiple Vitamins-Minerals (CENTRUM SILVER ULTRA MENS) Oral Tab, Take  by mouth., Disp: , Rfl:     Past Medical History:  Past Medical History:    Back pain    Blood in urine    Cancer (HCC)    CLL-monitored by Dr. Leti Gaitan tattoo    Diarrhea, unspecified    Essential hypertension, benign    Controlled     Extrinsic asthma, unspecified    High cholesterol    Kidney stone    Laboratory examination ordered as part of a routine general medical examination    Type II or unspecified type diabetes mellitus without mention of complication, not stated as uncontrolled    Unspecified essential hypertension    Visual impairment    glasses    Wears glasses       Past Surgical History:  Past Surgical History:   Procedure Laterality Date    Colonoscopy      Colonoscopy      Other  05/27/2016    cervical lad       Family Medical History:  Family History   Problem Relation Age of Onset    Other (CHF) Maternal Grandmother     Other (Acute Myocardial Infarction) Paternal Grandfather     Asthma Mother      Diabetes Paternal Grandmother        Psychosocial History:  Social History     Socioeconomic History    Marital status:      Spouse name: Not on file    Number of children: Not on file    Years of education: Not on file    Highest education level: Not on file   Occupational History    Not on file   Tobacco Use    Smoking status: Every Day     Types: Pipe    Smokeless tobacco: Never    Tobacco comments:     pipes and cigars   Vaping Use    Vaping status: Never Used   Substance and Sexual Activity    Alcohol use: Yes     Alcohol/week: 4.0 - 6.0 standard drinks of alcohol     Types: 4 - 6 Cans of beer per week     Comment: 3-4  Beers/week    Drug use: No    Sexual activity: Not on file   Other Topics Concern     Service Not Asked    Blood Transfusions Not Asked    Caffeine Concern Yes     Comment: 32-40 oz per day/coffee/tea    Occupational Exposure Not Asked    Hobby Hazards Not Asked    Sleep Concern Not Asked    Stress Concern Not Asked    Weight Concern Not Asked    Special Diet Not Asked    Back Care Not Asked    Exercise No    Bike Helmet Not Asked    Seat Belt Not Asked    Self-Exams Not Asked   Social History Narrative    Not on file     Social Drivers of Health     Financial Resource Strain: Not on file   Food Insecurity: Not on file   Transportation Needs: Not on file   Physical Activity: Not on file   Stress: Not on file   Social Connections: Not on file   Housing Stability: Not on file         Allergies:  Allergies   Allergen Reactions    Codeine OTHER (SEE COMMENTS)     nausea    Dust SHORTNESS OF BREATH    Mold SHORTNESS OF BREATH    Pollen SHORTNESS OF BREATH        Review of Systems:  A 14-point ROS was done with pertinent positives and negative per the HPI    Vital Signs:  Height: 188 cm (6' 2.02\") (10/18 0812)  Weight: 100.2 kg (221 lb) (10/18 0812)  BSA (Calculated - sq m): 2.27 sq meters (10/18 0812)  Pulse: 70 (10/18 0812)  BP: 119/71 (10/18 0812)  Temp: 97.9 °F (36.6 °C) (10/18  0812)  Do Not Use - Resp Rate: --  SpO2: 98 % (10/18 0812)      Physical Examination:  General: Patient is alert and oriented x 3, not in acute distress.  Psych:  Mood and affect appropriate  HEENT: EOMs intact. PERRL. Oropharynx is clear. Neck: No JVD. No significant adenopathy.   Neck is supple.  Chest: Clear to auscultation.  Heart: Regular rate and rhythm.   Abdomen: Soft, non tender with good bowel sounds.  No hepatosplenomegaly. No palpable mass.  Extremities: Pedal pulses are present. No BLE edema.   Neurological: Grossly intact.          Laboratory:  Recent Results (from the past 24 hours)   CBC W/DIFF [E]    Collection Time: 10/18/24  8:01 AM   Result Value Ref Range    WBC 7.0 4.0 - 11.0 x10(3) uL    RBC 5.37 4.30 - 5.70 x10(6)uL    HGB 16.5 13.0 - 17.5 g/dL    HCT 49.7 39.0 - 53.0 %    .0 (L) 150.0 - 450.0 10(3)uL    Immature Platelet Fraction 7.8 (H) 0.0 - 7.0 %    MCV 92.6 80.0 - 100.0 fL    MCH 30.7 26.0 - 34.0 pg    MCHC 33.2 31.0 - 37.0 g/dL    RDW 12.2 %    Neutrophil Absolute Prelim 4.30 1.50 - 7.70 x10 (3) uL    Neutrophil Absolute 4.30 1.50 - 7.70 x10(3) uL    Lymphocyte Absolute 1.87 1.00 - 4.00 x10(3) uL    Monocyte Absolute 0.59 0.10 - 1.00 x10(3) uL    Eosinophil Absolute 0.13 0.00 - 0.70 x10(3) uL    Basophil Absolute 0.07 0.00 - 0.20 x10(3) uL    Immature Granulocyte Absolute 0.01 0.00 - 1.00 x10(3) uL    Neutrophil % 61.7 %    Lymphocyte % 26.8 %    Monocyte % 8.5 %    Eosinophil % 1.9 %    Basophil % 1.0 %    Immature Granulocyte % 0.1 %   LDH [E]    Collection Time: 10/18/24  8:01 AM   Result Value Ref Range     120-246 U/L U/L   COMP METABOLIC PANEL [E]    Collection Time: 10/18/24  8:01 AM   Result Value Ref Range    Glucose 158 (H) 70 - 99 mg/dL    Sodium 139 136 - 145 mmol/L    Potassium 5.2 (H) 3.5 - 5.1 mmol/L    Chloride 105 98 - 112 mmol/L    CO2 27.0 21.0 - 32.0 mmol/L    Anion Gap 7 0 - 18 mmol/L    BUN 13 9 - 23 mg/dL    Creatinine 0.93 0.70 - 1.30 mg/dL     Calcium, Total 10.1 8.7 - 10.4 mg/dL    Calculated Osmolality 291 275 - 295 mOsm/kg    eGFR-Cr 95 >=60 mL/min/1.73m2    AST 40 (H) <34 U/L    ALT 51 (H) 10 - 49 U/L    Alkaline Phosphatase 60 45 - 117 U/L    Bilirubin, Total 0.6 0.3 - 1.2 mg/dL    Total Protein 6.9 5.7 - 8.2 g/dL    Albumin 5.0 (H) 3.2 - 4.8 g/dL    Globulin  1.9 (L) 2.0 - 3.5 g/dL    A/G Ratio 2.6 (H) 1.0 - 2.0    Patient Fasting for CMP? Patient not present    IMMUNOGLOBULIN G, QN, SERUM [E]    Collection Time: 10/18/24  8:01 AM   Result Value Ref Range    Immunoglobulin G 255 (L) 650 - 1,600 mg/dL       Radiology:  PROCEDURE:  US ABDOMEN COMPLETE (CPT=76700)     COMPARISON:  None.     INDICATIONS:  abnormal labs     TECHNIQUE:  Real time gray-scale ultrasound was used to evaluate the abdomen.  The exam includes images of the liver, gallbladder, common bile duct, pancreas, spleen, kidneys, IVC, and aorta.     PATIENT STATED HISTORY: (As transcribed by Technologist)           FINDINGS:    LIVER:  There is mild increased echogenicity of the liver which may represent fatty infiltration or hepatocellular disease.  Please note that this limits sensitivity for a focal hepatic lesion.  BILIARY:  No gallstones or pericholecystic fluid is seen.  No gallbladder wall thickening.  Low level echogenicity within the gallbladder likely represents sludge.  Common bile duct diameter is 5 mm.  PANCREAS:  The pancreas is not well seen due to overlying bowel gas.  SPLEEN:  Spleen measures up to 13 cm in length.  KIDNEYS:  Right kidney measures 12 cm.  Left kidney measures 11.9 cm.  AORTA/IVC:  Normal.                   Impression   CONCLUSION:  There is mild increased echogenicity of the liver which may represent fatty infiltration or hepatocellular disease.  Please note that this limits sensitivity for a focal hepatic lesion.     Gallbladder sludge is present.  No evidence of pericholecystic fluid or gallbladder wall thickening.        LOCATION:  [id number]            Dictated by (CST): Hitesh Aburto MD on 9/14/2023 at 6:47 PM      Finalized by (CST): Hitesh Aburto MD on 9/14/2023 at 6:50 PM        Impression and Plan:  1. CLL- switched to [place] due to iritis/uveitis. Tolerating well with manageable side effects.   WBC/lymphs, Hgb normal! Platelets mildly low on [place] which is not unusual. No bleeding. Continued monitoring for possible AEs.     2. Elevated K- likely how specimen was handled. Has been fine and renal function is good.     3. Elevated LFTs/pancreatitis-   had bout of pancreatitis from alcohol. Advised moderation if not stop altogether. He says what happened was not typical for him. Transaminases up some and does admit to having a tad more than his usual. Also with fatty liver.     4. Hypogammaglobulinemia- was on monthly IVIG and tolerated this well. As has not had recent severe infections for some time started spaced out infusions to Q 8-> 12 weeks--> 6 months.  He is aware of signs and symptoms to watch out for.     5. 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 Work-up for possible  autoimmune disorders and other potential etiology negative. Continues to be followed by ophth. No further episodes of acala      Labs reviewed with him    Continue [place] and IVIG    RTC 3 months. IVIG Q 6 months    Risk level high- CLL on targeted therapy and IVIG    Emotional Well Being:  I have assessed the patient's emotional well-being and any concerns about anxiety or depression.  We discussed issues of distress, coping difficulties and social support systems and currently there are no active problems.      Olivia Landeros MD  [facility]

## 2024-10-18 NOTE — PROGRESS NOTES
Education Record    Learner:  Patient    Disease / Diagnosis:hypogammaglobulinemia/CLL    Barriers / Limitations:  None   Comments:    Method:  Brief focused   Comments:    General Topics:  Plan of care reviewed   Comments:    Outcome:  Shows understanding   Comments:    IVIG given without complication.  Pt discharged in stable condition.  Pt to RTC in 6 months for next md, ivig tx.

## 2024-10-21 DIAGNOSIS — E11.9 TYPE 2 DIABETES MELLITUS WITHOUT COMPLICATION, WITHOUT LONG-TERM CURRENT USE OF INSULIN (HCC): ICD-10-CM

## 2024-10-21 DIAGNOSIS — C91.10 CLL (CHRONIC LYMPHOCYTIC LEUKEMIA) (HCC): ICD-10-CM

## 2024-10-21 RX ORDER — ACYCLOVIR 400 MG/1
400 TABLET ORAL 2 TIMES DAILY
Qty: 180 TABLET | Refills: 3 | Status: SHIPPED | OUTPATIENT
Start: 2024-10-21

## 2024-10-21 RX ORDER — DAPAGLIFLOZIN 10 MG/1
TABLET, FILM COATED ORAL
Qty: 90 TABLET | Refills: 0 | Status: SHIPPED | OUTPATIENT
Start: 2024-10-21

## 2024-10-21 NOTE — TELEPHONE ENCOUNTER
Addended by: CARRINGTON FARR on: 12/6/2022 02:11 PM     Modules accepted: Orders     Last time medication was refilled 05/11/2024  Last office visit 09/13/2024  Next office visit due/scheduled   Future Appointments   Date Time Provider Department Center   1/17/2025  1:45 PM Olivia Landeros MD  [facility]   3/13/2025  2:30 PM Serafin Jack MD [facility]   4/4/2025  1:00 PM Olivia Landeros MD  [facility]   4/4/2025  1:15 PM  TX RN3  [facility]     Medication passed protocol, refill sent.

## 2024-10-31 DIAGNOSIS — E11.9 TYPE 2 DIABETES MELLITUS WITHOUT COMPLICATION, WITHOUT LONG-TERM CURRENT USE OF INSULIN (HCC): ICD-10-CM

## 2024-10-31 NOTE — TELEPHONE ENCOUNTER
METFORMIN 850 MG Oral Tab     180 Tablets    [facility] [place] - [facility] AT PORTAL TO REGISTERED [facility] SITES, [phone], [phone] [[id number]]     Last OV 9/13/24    Next OV 3/13/25

## 2024-11-01 ENCOUNTER — LAB ENCOUNTER (OUTPATIENT)
Dept: LAB | Age: 59
End: 2024-11-01
Attending: INTERNAL MEDICINE
Payer: COMMERCIAL

## 2024-11-01 DIAGNOSIS — Z12.11 COLON CANCER SCREENING: ICD-10-CM

## 2024-11-01 PROCEDURE — 82274 ASSAY TEST FOR BLOOD FECAL: CPT

## 2024-11-01 NOTE — TELEPHONE ENCOUNTER
Last time medication was refilled 6/22/24  Last office visit  9/13/24  Next office visit due/scheduled   Future Appointments   Date Time Provider Department Center   1/17/2025  1:45 PM Olivia Landeros MD  [facility]   3/13/2025  2:30 PM Serafin Jack MD [facility]   4/4/2025  1:00 PM Olivia Landeros MD  [facility]   4/4/2025  1:15 PM  TX RN3  [facility]

## 2024-11-02 LAB — HEMOCCULT STL QL: NEGATIVE

## 2024-12-12 DIAGNOSIS — E11.9 TYPE 2 DIABETES MELLITUS WITHOUT COMPLICATION, WITHOUT LONG-TERM CURRENT USE OF INSULIN (HCC): ICD-10-CM

## 2024-12-12 DIAGNOSIS — E78.2 MIXED HYPERLIPIDEMIA: ICD-10-CM

## 2024-12-12 RX ORDER — ATORVASTATIN CALCIUM 40 MG/1
TABLET, FILM COATED ORAL
Qty: 90 TABLET | Refills: 0 | Status: SHIPPED | OUTPATIENT
Start: 2024-12-12

## 2024-12-12 RX ORDER — GLIMEPIRIDE 4 MG/1
4 TABLET ORAL 2 TIMES DAILY WITH MEALS
Qty: 180 TABLET | Refills: 0 | Status: SHIPPED | OUTPATIENT
Start: 2024-12-12

## 2024-12-12 NOTE — TELEPHONE ENCOUNTER
Last time medication was refilled 09/05/2024  Last office visit  09/13/2024  Next office visit due/scheduled   Future Appointments   Date Time Provider Department Center   1/17/2025  1:45 PM Olivia Landeros MD  [facility]   3/13/2025  2:30 PM Serafin Jack MD [facility]   4/4/2025  1:00 PM Olivia Landeros MD  [facility]   4/4/2025  1:15 PM NP TX RN3  [facility]     Passed protocol, Medication sent.

## 2025-01-09 ENCOUNTER — OFFICE VISIT (OUTPATIENT)
Dept: INTERNAL MEDICINE CLINIC | Facility: CLINIC | Age: 60
End: 2025-01-09
Payer: COMMERCIAL

## 2025-01-09 VITALS
HEIGHT: 74 IN | HEART RATE: 98 BPM | OXYGEN SATURATION: 100 % | WEIGHT: 222 LBS | RESPIRATION RATE: 18 BRPM | SYSTOLIC BLOOD PRESSURE: 128 MMHG | BODY MASS INDEX: 28.49 KG/M2 | DIASTOLIC BLOOD PRESSURE: 78 MMHG | TEMPERATURE: 98 F

## 2025-01-09 DIAGNOSIS — J10.1 INFLUENZA A H1N1 INFECTION: ICD-10-CM

## 2025-01-09 DIAGNOSIS — R05.9 COUGH IN ADULT: Primary | ICD-10-CM

## 2025-01-09 LAB
COVID19 BINAX NOW ANTIGEN: NOT DETECTED
OPERATOR ID: YES
POCT LOT NUMBER: NORMAL

## 2025-01-09 PROCEDURE — 3008F BODY MASS INDEX DOCD: CPT | Performed by: INTERNAL MEDICINE

## 2025-01-09 PROCEDURE — 3078F DIAST BP <80 MM HG: CPT | Performed by: INTERNAL MEDICINE

## 2025-01-09 PROCEDURE — 99214 OFFICE O/P EST MOD 30 MIN: CPT | Performed by: INTERNAL MEDICINE

## 2025-01-09 PROCEDURE — 3074F SYST BP LT 130 MM HG: CPT | Performed by: INTERNAL MEDICINE

## 2025-01-09 RX ORDER — OSELTAMIVIR PHOSPHATE 75 MG/1
75 CAPSULE ORAL 2 TIMES DAILY
Qty: 10 CAPSULE | Refills: 0 | Status: SHIPPED | OUTPATIENT
Start: 2025-01-09

## 2025-01-09 NOTE — PROGRESS NOTES
Subjective:   Patient ID: Deniz Aguilera is a 59 year old male.    Cough  This is a new problem. Episode onset: 2 days. The problem has been gradually worsening. The problem occurs every few minutes. The cough is Non-productive. Pertinent negatives include no chest pain, ear congestion, ear pain, fever, heartburn, myalgias, nasal congestion, postnasal drip, shortness of breath, sweats, weight loss or wheezing. The symptoms are aggravated by lying down. He has tried rest and OTC cough suppressant for the symptoms. The treatment provided no relief.       History/Other:   Review of Systems   Constitutional:  Negative for fever and weight loss.   HENT:  Negative for ear pain and postnasal drip.    Respiratory:  Positive for cough. Negative for shortness of breath and wheezing.    Cardiovascular:  Negative for chest pain.   Gastrointestinal:  Negative for heartburn.   Musculoskeletal:  Negative for myalgias.   All other systems reviewed and are negative.    Current Outpatient Medications   Medication Sig Dispense Refill    oseltamivir (TAMIFLU) 75 MG Oral Cap Take 1 capsule (75 mg total) by mouth 2 (two) times daily. 10 capsule 0    ATORVASTATIN 40 MG Oral Tab TAKE 1 TABLET DAILY 90 tablet 0    GLIMEPIRIDE 4 MG Oral Tab TAKE 1 TABLET TWICE A DAY  WITH MEALS 180 tablet 0    metFORMIN 850 MG Oral Tab Take 1 tablet (850 mg total) by mouth 2 (two) times daily. 180 tablet 0    ACYCLOVIR 400 MG Oral Tab TAKE 1 TABLET TWICE A  tablet 3    dapagliflozin (FARXIGA) 10 MG Oral Tab TAKE 1 TABLET DAILY 90 tablet 0    immune globulin, human, (GAMMAGARD) 20 g/200mL Injection Solution Inject every 6 months 20 g 2    Magnesium 200 MG Oral Tab Take 1 tablet (200 mg total) by mouth daily.      FLUTICASONE PROPIONATE DISKUS 250 MCG/ACT Inhalation Aerosol Powder, Breath Activated USE 1 INHALATION TWICE A    each 3    FLUCONAZOLE 100 MG Oral Tab TAKE 1 TABLET DAILY 30 tablet 5    albuterol (PROAIR HFA) 108 (90 Base) MCG/ACT  Inhalation Aero Soln INHALE 2 PUFFS BY MOUTH EVERY SIX HOURS AS NEEDED for wheezing 8.5 g 0    Acalabrutinib Maleate 100 MG Oral Tab Take 100 mg by mouth 2 (two) times daily. Take 1 tablet (100 mg) twice daily, approximately 12 hours apart, with water.  May be taken with or without food. 60 tablet 11    ondansetron (ZOFRAN) 8 MG tablet Take 1 tablet (8 mg total) by mouth every 8 (eight) hours as needed for Nausea. 30 tablet 3    Sildenafil Citrate 100 MG Oral Tab TAKE 1 TABLET DAILY AS NEEDED FOR ERECTILE DYSFUNCTION. MAX DOSE 100 MG IN 24 HOURS 30 tablet 3    B Complex-C-Folic Acid (VITALINE BIOTIN FORTE) 0.8 MG Oral Tab Take by mouth.      Prochlorperazine Maleate (COMPAZINE) 10 mg tablet TAKE 1 TABLET BY MOUTH EVERY SIX HOURS AS NEEDED FOR NAUSEA 30 tablet 3    Cyanocobalamin (VITAMIN B 12 OR) Take by mouth.      aspirin 81 MG Oral Tab Take 1 tablet (81 mg total) by mouth daily.  0    Fexofenadine HCl (WAL-FEX ALLERGY) 180 MG Oral Tab Take 1 tablet by mouth once daily. 90 tablet 1    Multiple Vitamins-Minerals (CENTRUM SILVER ULTRA MENS) Oral Tab Take  by mouth.       Allergies:Allergies[1]    Objective:   Physical Exam  Vitals and nursing note reviewed.   Constitutional:       Appearance: Normal appearance.   HENT:      Right Ear: Tympanic membrane normal.      Left Ear: Tympanic membrane normal.   Cardiovascular:      Rate and Rhythm: Normal rate and regular rhythm.      Heart sounds: Normal heart sounds.   Pulmonary:      Effort: Pulmonary effort is normal.      Breath sounds: Normal breath sounds.   Abdominal:      General: Abdomen is flat. Bowel sounds are normal.      Palpations: Abdomen is soft.   Lymphadenopathy:      Cervical: No cervical adenopathy.   Neurological:      Mental Status: He is alert.         Assessment & Plan:   1. Cough in adult    2. Influenza A H1N1 infection    -negative in office covid screen  - empirically treat for influenza    Orders Placed This Encounter   Procedures    COVID19  BinaxNOW Antigen       Meds This Visit:  Requested Prescriptions     Signed Prescriptions Disp Refills    oseltamivir (TAMIFLU) 75 MG Oral Cap 10 capsule 0     Sig: Take 1 capsule (75 mg total) by mouth 2 (two) times daily.       Imaging & Referrals:  None         [1]   Allergies  Allergen Reactions    Codeine OTHER (SEE COMMENTS)     nausea    Dust SHORTNESS OF BREATH    Mold SHORTNESS OF BREATH    Pollen SHORTNESS OF BREATH

## 2025-01-12 DIAGNOSIS — E11.9 TYPE 2 DIABETES MELLITUS WITHOUT COMPLICATION, WITHOUT LONG-TERM CURRENT USE OF INSULIN (HCC): ICD-10-CM

## 2025-01-13 NOTE — TELEPHONE ENCOUNTER
Last time medication was refilled 11/01/2024  Last office visit  01/09/2025  Next office visit due/scheduled   Future Appointments   Date Time Provider Department Center   1/17/2025  1:45 PM Olivia Landeros MD NP [facility]   3/13/2025  2:30 PM Serafin Jack MD [facility]   4/4/2025  1:00 PM Olivia Landeros MD NP [facility]   4/4/2025  1:15 PM NP TX RN3 NP [facility]     Medication passed protocol, refill sent.

## 2025-01-15 DIAGNOSIS — J10.1 INFLUENZA A H1N1 INFECTION: ICD-10-CM

## 2025-01-15 DIAGNOSIS — N52.9 ERECTILE DYSFUNCTION, UNSPECIFIED ERECTILE DYSFUNCTION TYPE: ICD-10-CM

## 2025-01-15 RX ORDER — SILDENAFIL 100 MG/1
TABLET, FILM COATED ORAL
Qty: 18 TABLET | Refills: 3 | Status: SHIPPED | OUTPATIENT
Start: 2025-01-15

## 2025-01-15 RX ORDER — OSELTAMIVIR PHOSPHATE 75 MG/1
75 CAPSULE ORAL 2 TIMES DAILY
Qty: 10 CAPSULE | Refills: 0 | OUTPATIENT
Start: 2025-01-15

## 2025-01-15 NOTE — TELEPHONE ENCOUNTER
Last time medication was refilled 11/20/2023  Last office visit  01/09/2025  Next office visit due/scheduled   Future Appointments   Date Time Provider Department Center   1/17/2025  1:45 PM Olivia Landeros MD NP [facility]   3/13/2025  2:30 PM Serafin Jack MD [facility]   4/4/2025  1:00 PM Olivia Landeros MD NP [facility]   4/4/2025  1:15 PM NP TX RN3 NP  Inf [facility]     Medication not on protocol.

## 2025-01-17 ENCOUNTER — OFFICE VISIT (OUTPATIENT)
Age: 60
End: 2025-01-17
Attending: INTERNAL MEDICINE
Payer: COMMERCIAL

## 2025-01-17 VITALS
TEMPERATURE: 97 F | HEART RATE: 80 BPM | WEIGHT: 219.38 LBS | OXYGEN SATURATION: 98 % | DIASTOLIC BLOOD PRESSURE: 70 MMHG | SYSTOLIC BLOOD PRESSURE: 103 MMHG | HEIGHT: 74.02 IN | BODY MASS INDEX: 28.15 KG/M2

## 2025-01-17 DIAGNOSIS — D69.6 THROMBOCYTOPENIA (HCC): ICD-10-CM

## 2025-01-17 DIAGNOSIS — D80.1 HYPOGAMMAGLOBULINEMIA (HCC): ICD-10-CM

## 2025-01-17 DIAGNOSIS — H20.9 IRITIS: ICD-10-CM

## 2025-01-17 DIAGNOSIS — H15.001 SCLERITIS OF RIGHT EYE: Primary | ICD-10-CM

## 2025-01-17 DIAGNOSIS — J06.9 VIRAL URI: ICD-10-CM

## 2025-01-17 DIAGNOSIS — T88.7XXA MEDICATION SIDE EFFECTS: ICD-10-CM

## 2025-01-17 DIAGNOSIS — R74.01 TRANSAMINITIS: ICD-10-CM

## 2025-01-17 DIAGNOSIS — C91.10 CLL (CHRONIC LYMPHOCYTIC LEUKEMIA) (HCC): ICD-10-CM

## 2025-01-17 LAB
ALBUMIN SERPL-MCNC: 4.6 G/DL (ref 3.2–4.8)
ALBUMIN/GLOB SERPL: 2 {RATIO} (ref 1–2)
ALP LIVER SERPL-CCNC: 61 U/L
ALT SERPL-CCNC: 39 U/L
ANION GAP SERPL CALC-SCNC: 5 MMOL/L (ref 0–18)
AST SERPL-CCNC: 27 U/L (ref ?–34)
BASOPHILS # BLD AUTO: 0.05 X10(3) UL (ref 0–0.2)
BASOPHILS NFR BLD AUTO: 0.7 %
BILIRUB SERPL-MCNC: 0.5 MG/DL (ref 0.3–1.2)
BUN BLD-MCNC: 14 MG/DL (ref 9–23)
CALCIUM BLD-MCNC: 10.3 MG/DL (ref 8.7–10.6)
CHLORIDE SERPL-SCNC: 107 MMOL/L (ref 98–112)
CO2 SERPL-SCNC: 26 MMOL/L (ref 21–32)
CREAT BLD-MCNC: 0.6 MG/DL
EGFRCR SERPLBLD CKD-EPI 2021: 111 ML/MIN/1.73M2 (ref 60–?)
EOSINOPHIL # BLD AUTO: 0.16 X10(3) UL (ref 0–0.7)
EOSINOPHIL NFR BLD AUTO: 2.1 %
ERYTHROCYTE [DISTWIDTH] IN BLOOD BY AUTOMATED COUNT: 12 %
GLOBULIN PLAS-MCNC: 2.3 G/DL (ref 2–3.5)
GLUCOSE BLD-MCNC: 105 MG/DL (ref 70–99)
HCT VFR BLD AUTO: 44 %
HGB BLD-MCNC: 15.3 G/DL
IMM GRANULOCYTES # BLD AUTO: 0.03 X10(3) UL (ref 0–1)
IMM GRANULOCYTES NFR BLD: 0.4 %
IMMUNOGLOBULIN PNL SER-MCNC: 284 MG/DL (ref 650–1600)
LDH SERPL L TO P-CCNC: 143 U/L
LYMPHOCYTES # BLD AUTO: 2.41 X10(3) UL (ref 1–4)
LYMPHOCYTES NFR BLD AUTO: 31.9 %
MCH RBC QN AUTO: 31.5 PG (ref 26–34)
MCHC RBC AUTO-ENTMCNC: 34.8 G/DL (ref 31–37)
MCV RBC AUTO: 90.7 FL
MONOCYTES # BLD AUTO: 0.49 X10(3) UL (ref 0.1–1)
MONOCYTES NFR BLD AUTO: 6.5 %
NEUTROPHILS # BLD AUTO: 4.42 X10 (3) UL (ref 1.5–7.7)
NEUTROPHILS # BLD AUTO: 4.42 X10(3) UL (ref 1.5–7.7)
NEUTROPHILS NFR BLD AUTO: 58.4 %
OSMOLALITY SERPL CALC.SUM OF ELEC: 287 MOSM/KG (ref 275–295)
PLATELET # BLD AUTO: 159 10(3)UL (ref 150–450)
POTASSIUM SERPL-SCNC: 4.1 MMOL/L (ref 3.5–5.1)
PROT SERPL-MCNC: 6.9 G/DL (ref 5.7–8.2)
RBC # BLD AUTO: 4.85 X10(6)UL
SODIUM SERPL-SCNC: 138 MMOL/L (ref 136–145)
WBC # BLD AUTO: 7.6 X10(3) UL (ref 4–11)

## 2025-01-17 NOTE — PROGRESS NOTES
Cancer Center Progress Note    Patient Name: Deniz Aguilera   YOB: 1965   Medical Record Number: [id number]   CSN: [id number]   Attending Physician: Olivia Landeros M.D.   Referring Physician: Serafin Jack MD      Date of Visit: 1/17/2025     Chief Complaint:  Chief Complaint   Patient presents with    Follow - Up     CLL pt here for f/u. Continues on acalabrutinib with occasional diarrhea (twice monthly) Recovering from respiratory illness, cough/fatigue remains, occasional SOB,       Diagnosis:  CLL (originally diagnosed with stage II CLL at presentation with lymphocytosis with lymphadenopathy) diagnosed 7/2013     - noted increase in neck nodes and underwent excisional biopsy of a right cervical lymph node 5/27/16. Pathology revealed CLL/SLL- no evidence of transformation.     -  del(17p), TP53, IGHV mutation negative         - started Ibrutinib 11/7/18.     Presented to the ED 9/1/19 c/o \"feeling like dirt\" with fever, sore throat, chills, body aches, sinus headaches. No appetite with PO intake poor last few days. CT head showed no acute findings. Felt to have possible viral syndrome with dehydration and was sent home. He then developed diarrhea and took imodium. Has not had a BM last 2 days. Reports intermittent \"bloody boogers\" but no bleeding elsewhere.      Was seen at the [facility] by one of our APNs and was noted to have thrombocytopenia and elevated transaminases. Was admitted for further w/u and management. Took up to 7 tabs of tylenol one day- not sure of dose. No ETOH lately or illicit drug use. No known h/o hepatitis. Does have fatty liver seen on previous imaging.      Ibrutinib was held and was hydrated. Bacterial cultures and respiratory panel were negative. LDH was elevated at 1050. Viral titers were sent (EBV/HSV and CMV). EBV IgG and nuclear AG ABS positive but IgM negative. IgG levels were 179. Acute hepatitis panel was negative. No significant schistocytes seen on smear.      He was given IVIG for hypogammaglobulinemia. CT C/A/P showed splenomegaly with decrease in known nodes. Slowly improved and was discharged home 9/7/19.     - Resumed Ibrutinib 10/4/19     - Switched to Acalabrutinib about 6/2024 due to iritis/uveitis      History of Present Illness:  Reported cough, fatigue and occasional SOB. Wife has same symptoms. Saw his PCP and was placed empirically on Tamiflu. Was checked for COVID which came back negative. Continues of acala with occasional diarrhea. No further recurrent episodes of iritis/uveitis since he switched. No fevers.  Chronic night sweats. Denies change in urinary habits. No bleeding or palpitations. Appetite good. No B symptoms or rash.       Current Medications:    Current Outpatient Medications:     Sildenafil Citrate 100 MG Oral Tab, TAKE 1 TABLET DAILY AS     NEEDED FOR ERECTILE        DYSFUNCTION MAXIMUM DOSE   100MG IN 24 HOURS, Disp: 18 tablet, Rfl: 3    METFORMIN 850 MG Oral Tab, TAKE 1 TABLET TWICE A DAY, Disp: 180 tablet, Rfl: 0    ATORVASTATIN 40 MG Oral Tab, TAKE 1 TABLET DAILY, Disp: 90 tablet, Rfl: 0    GLIMEPIRIDE 4 MG Oral Tab, TAKE 1 TABLET TWICE A DAY  WITH MEALS, Disp: 180 tablet, Rfl: 0    ACYCLOVIR 400 MG Oral Tab, TAKE 1 TABLET TWICE A DAY, Disp: 180 tablet, Rfl: 3    dapagliflozin (FARXIGA) 10 MG Oral Tab, TAKE 1 TABLET DAILY, Disp: 90 tablet, Rfl: 0    immune globulin, human, (GAMMAGARD) 20 g/200mL Injection Solution, Inject every 6 months, Disp: 20 g, Rfl: 2    Magnesium 200 MG Oral Tab, Take 1 tablet (200 mg total) by mouth daily., Disp: , Rfl:     FLUTICASONE PROPIONATE DISKUS 250 MCG/ACT Inhalation Aerosol Powder, Breath Activated, USE 1 INHALATION TWICE A   DAY, Disp: 180 each, Rfl: 3    FLUCONAZOLE 100 MG Oral Tab, TAKE 1 TABLET DAILY, Disp: 30 tablet, Rfl: 5    albuterol (PROAIR HFA) 108 (90 Base) MCG/ACT Inhalation Aero Soln, INHALE 2 PUFFS BY MOUTH EVERY SIX HOURS AS NEEDED for wheezing, Disp: 8.5 g, Rfl: 0    Acalabrutinib  Maleate 100 MG Oral Tab, Take 100 mg by mouth 2 (two) times daily. Take 1 tablet (100 mg) twice daily, approximately 12 hours apart, with water.  May be taken with or without food., Disp: 60 tablet, Rfl: 11    ondansetron (ZOFRAN) 8 MG tablet, Take 1 tablet (8 mg total) by mouth every 8 (eight) hours as needed for Nausea., Disp: 30 tablet, Rfl: 3    B Complex-C-Folic Acid (VITALINE BIOTIN FORTE) 0.8 MG Oral Tab, Take by mouth., Disp: , Rfl:     Prochlorperazine Maleate (COMPAZINE) 10 mg tablet, TAKE 1 TABLET BY MOUTH EVERY SIX HOURS AS NEEDED FOR NAUSEA, Disp: 30 tablet, Rfl: 3    Cyanocobalamin (VITAMIN B 12 OR), Take by mouth., Disp: , Rfl:     aspirin 81 MG Oral Tab, Take 1 tablet (81 mg total) by mouth daily., Disp: , Rfl: 0    Fexofenadine HCl (WAL-FEX ALLERGY) 180 MG Oral Tab, Take 1 tablet by mouth once daily., Disp: 90 tablet, Rfl: 1    Multiple Vitamins-Minerals (CENTRUM SILVER ULTRA MENS) Oral Tab, Take  by mouth., Disp: , Rfl:     Past Medical History:  Past Medical History:    Back pain    Blood in urine    Cancer (HCC)    CLL-monitored by Dr. Landeros    Decorative tattoo    Diarrhea, unspecified    Essential hypertension, benign    Controlled     Extrinsic asthma, unspecified    High cholesterol    Kidney stone    Laboratory examination ordered as part of a routine general medical examination    Type II or unspecified type diabetes mellitus without mention of complication, not stated as uncontrolled    Unspecified essential hypertension    Visual impairment    glasses    Wears glasses       Past Surgical History:  Past Surgical History:   Procedure Laterality Date    Colonoscopy      Colonoscopy      Other  05/27/2016    cervical lad       Family Medical History:  Family History   Problem Relation Age of Onset    Other (CHF) Maternal Grandmother     Other (Acute Myocardial Infarction) Paternal Grandfather     Asthma Mother     Diabetes Paternal Grandmother        Psychosocial History:  Social History      Socioeconomic History    Marital status:      Spouse name: Not on file    Number of children: Not on file    Years of education: Not on file    Highest education level: Not on file   Occupational History    Not on file   Tobacco Use    Smoking status: Every Day     Types: Pipe    Smokeless tobacco: Never    Tobacco comments:     pipes and cigars   Vaping Use    Vaping status: Never Used   Substance and Sexual Activity    Alcohol use: Yes     Alcohol/week: 4.0 - 6.0 standard drinks of alcohol     Types: 4 - 6 Cans of beer per week     Comment: 3-4  Beers/week    Drug use: No    Sexual activity: Not on file   Other Topics Concern     Service Not Asked    Blood Transfusions Not Asked    Caffeine Concern Yes     Comment: 32-40 oz per day/coffee/tea    Occupational Exposure Not Asked    Hobby Hazards Not Asked    Sleep Concern Not Asked    Stress Concern Not Asked    Weight Concern Not Asked    Special Diet Not Asked    Back Care Not Asked    Exercise No    Bike Helmet Not Asked    Seat Belt Not Asked    Self-Exams Not Asked   Social History Narrative    Not on file     Social Drivers of Health     Financial Resource Strain: Not on file   Food Insecurity: Not on file   Transportation Needs: Not on file   Physical Activity: Not on file   Stress: Not on file   Social Connections: Not on file   Housing Stability: Not on file         Allergies:  Allergies   Allergen Reactions    Codeine OTHER (SEE COMMENTS)     nausea    Dust SHORTNESS OF BREATH    Mold SHORTNESS OF BREATH    Pollen SHORTNESS OF BREATH        Review of Systems:  A 14-point ROS was done with pertinent positives and negative per the HPI    Vital Signs:  Height: 188 cm (6' 2.02\") (01/17 1343)  Weight: 99.5 kg (219 lb 6.4 oz) (01/17 1343)  BSA (Calculated - sq m): 2.26 sq meters (01/17 1343)  Pulse: 80 (01/17 1343)  BP: 103/70 (01/17 1343)  Temp: 96.9 °F (36.1 °C) (01/17 1343)  Do Not Use - Resp Rate: --  SpO2: 98 % (01/17 1343)      Physical  Examination:  General: Patient is alert and oriented x 3, not in acute distress.  Psych:  Mood and affect appropriate  HEENT: EOMs intact. PERRL. Oropharynx is clear. Neck: No JVD. No significant adenopathy.   Neck is supple.  Chest: Clear to auscultation.  Heart: Regular rate and rhythm.   Abdomen: Soft, non tender with good bowel sounds.  No hepatosplenomegaly. No palpable mass.  Extremities: Pedal pulses are present. No BLE edema.   Neurological: Grossly intact.          Laboratory:  Lab Results   Component Value Date    WBC 7.6 01/17/2025    RBC 4.85 01/17/2025    HGB 15.3 01/17/2025    HCT 44.0 01/17/2025    .0 01/17/2025    MCV 90.7 01/17/2025    MCH 31.5 01/17/2025    MCHC 34.8 01/17/2025    RDW 12.0 01/17/2025    NEPRELIM 4.42 01/17/2025    NEUTABS 4.91 09/07/2019    LYMPHABS 2.42 09/07/2019    EOSABS 0.00 09/07/2019    BASABS 0.00 09/07/2019    NEUT 60 09/07/2019    LYMPH 31 09/07/2019    MON 6 09/07/2019    EOS 0 09/07/2019    BASO 0 09/07/2019    NEPERCENT 58.4 01/17/2025    LYPERCENT 31.9 01/17/2025    MOPERCENT 6.5 01/17/2025    EOPERCENT 2.1 01/17/2025    BAPERCENT 0.7 01/17/2025    NE 4.42 01/17/2025    LYMABS 2.41 01/17/2025    MOABSO 0.49 01/17/2025    EOABSO 0.16 01/17/2025    BAABSO 0.05 01/17/2025     Lab Results   Component Value Date     (H) 01/17/2025    BUN 14 01/17/2025    BUNCREA 15.4 06/11/2021    CREATSERUM 0.60 (L) 01/17/2025    ANIONGAP 5 01/17/2025    GFR 98 01/11/2018    GFRNAA 95 07/22/2022    GFRAA 110 07/22/2022    CA 10.3 01/17/2025    OSMOCALC 287 01/17/2025    ALKPHO 61 01/17/2025    AST 27 01/17/2025    ALT 39 01/17/2025    BILT 0.5 01/17/2025    TP 6.9 01/17/2025    ALB 4.6 01/17/2025    GLOBULIN 2.3 01/17/2025    AGRATIO 2.7 (H) 06/29/2013     01/17/2025    K 4.1 01/17/2025     01/17/2025    CO2 26.0 01/17/2025      Latest Reference Range & Units 01/17/25 13:34   IMMUNOGLOBULIN G 650 - 1,600 mg/dL 284 (L)   (L): Data is abnormally  low    Radiology:  PROCEDURE:  US ABDOMEN COMPLETE (CPT=76700)     COMPARISON:  None.     INDICATIONS:  abnormal labs     TECHNIQUE:  Real time gray-scale ultrasound was used to evaluate the abdomen.  The exam includes images of the liver, gallbladder, common bile duct, pancreas, spleen, kidneys, IVC, and aorta.     PATIENT STATED HISTORY: (As transcribed by Technologist)           FINDINGS:    LIVER:  There is mild increased echogenicity of the liver which may represent fatty infiltration or hepatocellular disease.  Please note that this limits sensitivity for a focal hepatic lesion.  BILIARY:  No gallstones or pericholecystic fluid is seen.  No gallbladder wall thickening.  Low level echogenicity within the gallbladder likely represents sludge.  Common bile duct diameter is 5 mm.  PANCREAS:  The pancreas is not well seen due to overlying bowel gas.  SPLEEN:  Spleen measures up to 13 cm in length.  KIDNEYS:  Right kidney measures 12 cm.  Left kidney measures 11.9 cm.  AORTA/IVC:  Normal.                   Impression   CONCLUSION:  There is mild increased echogenicity of the liver which may represent fatty infiltration or hepatocellular disease.  Please note that this limits sensitivity for a focal hepatic lesion.     Gallbladder sludge is present.  No evidence of pericholecystic fluid or gallbladder wall thickening.        LOCATION:  [id number]           Dictated by (CST): Hitesh Aburto MD on 9/14/2023 at 6:47 PM      Finalized by (CST): Hitesh Aburto MD on 9/14/2023 at 6:50 PM        Impression and Plan:  1. CLL- switched to [place] due to iritis/uveitis. Tolerating well with manageable side effects.   Counts normal! No bleeding. Continued monitoring for possible AEs.     2. Elevated K- normal today    3. Elevated LFTs/pancreatitis-   had bout of pancreatitis from alcohol. Advised moderation if not stop altogether. He says what happened was not typical for him. Transaminases normal today.Also with fatty liver.     4.  Hypogammaglobulinemia- was on monthly IVIG and tolerated this well. As has not had recent severe (bacterial) infections for some time started spaced out infusions to Q 8-> 12 weeks--> 6 months.  He is aware of signs and symptoms to watch out for. Appears to have viral  syndrome. Was placed empirically on Tamiflu by PCP. OTC meds for symptoms.     5. 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 Work-up for possible  autoimmune disorders and other potential etiology negative. Continues to be followed by ophth. No further episodes on acala      Labs reviewed with him    Continue [place] and IVIG    RTC 3 months. IVIG Q 6 months    Risk level high- CLL on targeted therapy and IVIG    Emotional Well Being:  I have assessed the patient's emotional well-being and any concerns about anxiety or depression.  We discussed issues of distress, coping difficulties and social support systems and currently there are no active problems.      Olivia Landeros MD  [facility]

## 2025-01-29 DIAGNOSIS — C91.10 CLL (CHRONIC LYMPHOCYTIC LEUKEMIA) (HCC): ICD-10-CM

## 2025-01-29 RX ORDER — FLUCONAZOLE 100 MG/1
100 TABLET ORAL DAILY
Qty: 30 TABLET | Refills: 5 | Status: SHIPPED | OUTPATIENT
Start: 2025-01-29 | End: 2025-03-23

## 2025-02-17 ENCOUNTER — TELEPHONE (OUTPATIENT)
Dept: INTERNAL MEDICINE CLINIC | Facility: CLINIC | Age: 60
End: 2025-02-17

## 2025-02-17 DIAGNOSIS — E11.9 TYPE 2 DIABETES MELLITUS WITHOUT COMPLICATION, WITHOUT LONG-TERM CURRENT USE OF INSULIN (HCC): ICD-10-CM

## 2025-02-17 RX ORDER — DAPAGLIFLOZIN 10 MG/1
10 TABLET, FILM COATED ORAL DAILY
Qty: 10 TABLET | Refills: 0 | Status: SHIPPED | OUTPATIENT
Start: 2025-02-17 | End: 2025-02-19

## 2025-02-17 NOTE — TELEPHONE ENCOUNTER
Medication requested: dapagliflozin (FARXIGA) 10 MG Oral Tab     Is patient requesting 30 or 90 day supply:  10    Pharmacy name/location:  [id number] [place] [facility] [place] [address]  [phone], [phone] [[id number]]     LOV:  01/09/2025    Is the patient due for appointment: no  (if so, please schedule)    Additional notes:  Patient states that he only needs 10 days until mail order sends this 3mo supply

## 2025-02-19 DIAGNOSIS — E11.9 TYPE 2 DIABETES MELLITUS WITHOUT COMPLICATION, WITHOUT LONG-TERM CURRENT USE OF INSULIN (HCC): ICD-10-CM

## 2025-02-19 RX ORDER — DAPAGLIFLOZIN 10 MG/1
10 TABLET, FILM COATED ORAL DAILY
Qty: 90 TABLET | Refills: 0 | Status: SHIPPED | OUTPATIENT
Start: 2025-02-19

## 2025-02-19 NOTE — TELEPHONE ENCOUNTER
Last time medication was refilled 2/17/25  Last office visit  1/9/25  Next office visit due/scheduled   Future Appointments   Date Time Provider Department Center   2/22/2025  7:00 AM REF SO [facility] REF [facility] Ref [facility]   3/13/2025  2:30 PM Serafin Jack MD [facility]   4/4/2025  1:00 PM Olivia Landeros MD NP [facility]   4/4/2025  1:15 PM NP TX RN3 NP [facility]     Passed protocol, Medication sent.

## 2025-02-22 ENCOUNTER — LAB ENCOUNTER (OUTPATIENT)
Dept: LAB | Age: 60
End: 2025-02-22
Attending: INTERNAL MEDICINE
Payer: COMMERCIAL

## 2025-02-22 DIAGNOSIS — E11.9 TYPE 2 DIABETES MELLITUS WITHOUT COMPLICATION, WITHOUT LONG-TERM CURRENT USE OF INSULIN (HCC): ICD-10-CM

## 2025-02-22 DIAGNOSIS — Z12.5 PROSTATE CANCER SCREENING: ICD-10-CM

## 2025-02-22 LAB
ALBUMIN SERPL-MCNC: 4.9 G/DL (ref 3.2–4.8)
ALBUMIN/GLOB SERPL: 2.7 {RATIO} (ref 1–2)
ALP LIVER SERPL-CCNC: 55 U/L
ALT SERPL-CCNC: 31 U/L
ANION GAP SERPL CALC-SCNC: 8 MMOL/L (ref 0–18)
AST SERPL-CCNC: 24 U/L (ref ?–34)
BASOPHILS # BLD AUTO: 0.08 X10(3) UL (ref 0–0.2)
BASOPHILS NFR BLD AUTO: 1.1 %
BILIRUB SERPL-MCNC: 0.4 MG/DL (ref 0.3–1.2)
BILIRUB UR QL STRIP.AUTO: NEGATIVE
BUN BLD-MCNC: 13 MG/DL (ref 9–23)
CALCIUM BLD-MCNC: 9.6 MG/DL (ref 8.7–10.6)
CHLORIDE SERPL-SCNC: 103 MMOL/L (ref 98–112)
CHOLEST SERPL-MCNC: 123 MG/DL (ref ?–200)
CLARITY UR REFRACT.AUTO: CLEAR
CO2 SERPL-SCNC: 27 MMOL/L (ref 21–32)
COLOR UR AUTO: YELLOW
COMPLEXED PSA SERPL-MCNC: 0.66 NG/ML (ref ?–4)
CREAT BLD-MCNC: 0.93 MG/DL
CREAT UR-SCNC: 170.5 MG/DL
EGFRCR SERPLBLD CKD-EPI 2021: 95 ML/MIN/1.73M2 (ref 60–?)
EOSINOPHIL # BLD AUTO: 0.15 X10(3) UL (ref 0–0.7)
EOSINOPHIL NFR BLD AUTO: 2.1 %
ERYTHROCYTE [DISTWIDTH] IN BLOOD BY AUTOMATED COUNT: 12.5 %
EST. AVERAGE GLUCOSE BLD GHB EST-MCNC: 146 MG/DL (ref 68–126)
FASTING PATIENT LIPID ANSWER: YES
FASTING STATUS PATIENT QL REPORTED: YES
GLOBULIN PLAS-MCNC: 1.8 G/DL (ref 2–3.5)
GLUCOSE BLD-MCNC: 178 MG/DL (ref 70–99)
GLUCOSE UR STRIP.AUTO-MCNC: >1000 MG/DL
HBA1C MFR BLD: 6.7 % (ref ?–5.7)
HCT VFR BLD AUTO: 47.8 %
HDLC SERPL-MCNC: 32 MG/DL (ref 40–59)
HGB BLD-MCNC: 15.9 G/DL
IMM GRANULOCYTES # BLD AUTO: 0.01 X10(3) UL (ref 0–1)
IMM GRANULOCYTES NFR BLD: 0.1 %
KETONES UR STRIP.AUTO-MCNC: NEGATIVE MG/DL
LDLC SERPL CALC-MCNC: 68 MG/DL (ref ?–100)
LEUKOCYTE ESTERASE UR QL STRIP.AUTO: NEGATIVE
LYMPHOCYTES # BLD AUTO: 2.6 X10(3) UL (ref 1–4)
LYMPHOCYTES NFR BLD AUTO: 36.9 %
MCH RBC QN AUTO: 30.9 PG (ref 26–34)
MCHC RBC AUTO-ENTMCNC: 33.3 G/DL (ref 31–37)
MCV RBC AUTO: 92.8 FL
MICROALBUMIN UR-MCNC: 2.8 MG/DL
MICROALBUMIN/CREAT 24H UR-RTO: 16.4 UG/MG (ref ?–30)
MONOCYTES # BLD AUTO: 0.45 X10(3) UL (ref 0.1–1)
MONOCYTES NFR BLD AUTO: 6.4 %
NEUTROPHILS # BLD AUTO: 3.76 X10 (3) UL (ref 1.5–7.7)
NEUTROPHILS # BLD AUTO: 3.76 X10(3) UL (ref 1.5–7.7)
NEUTROPHILS NFR BLD AUTO: 53.4 %
NITRITE UR QL STRIP.AUTO: NEGATIVE
NONHDLC SERPL-MCNC: 91 MG/DL (ref ?–130)
OSMOLALITY SERPL CALC.SUM OF ELEC: 291 MOSM/KG (ref 275–295)
PH UR STRIP.AUTO: 5.5 [PH] (ref 5–8)
PLATELET # BLD AUTO: 127 10(3)UL (ref 150–450)
PLATELETS.RETICULATED NFR BLD AUTO: 7.9 % (ref 0–7)
POTASSIUM SERPL-SCNC: 4.2 MMOL/L (ref 3.5–5.1)
PROT SERPL-MCNC: 6.7 G/DL (ref 5.7–8.2)
PROT UR STRIP.AUTO-MCNC: NEGATIVE MG/DL
RBC # BLD AUTO: 5.15 X10(6)UL
RBC UR QL AUTO: NEGATIVE
SODIUM SERPL-SCNC: 138 MMOL/L (ref 136–145)
SP GR UR STRIP.AUTO: 1.02 (ref 1–1.03)
TRIGL SERPL-MCNC: 130 MG/DL (ref 30–149)
TSI SER-ACNC: 2.53 UIU/ML (ref 0.55–4.78)
UROBILINOGEN UR STRIP.AUTO-MCNC: NORMAL MG/DL
VLDLC SERPL CALC-MCNC: 20 MG/DL (ref 0–30)
WBC # BLD AUTO: 7.1 X10(3) UL (ref 4–11)

## 2025-02-22 PROCEDURE — 84443 ASSAY THYROID STIM HORMONE: CPT

## 2025-02-22 PROCEDURE — 80061 LIPID PANEL: CPT

## 2025-02-22 PROCEDURE — 85025 COMPLETE CBC W/AUTO DIFF WBC: CPT

## 2025-02-22 PROCEDURE — 82043 UR ALBUMIN QUANTITATIVE: CPT

## 2025-02-22 PROCEDURE — 80053 COMPREHEN METABOLIC PANEL: CPT

## 2025-02-22 PROCEDURE — 81003 URINALYSIS AUTO W/O SCOPE: CPT

## 2025-02-22 PROCEDURE — 36415 COLL VENOUS BLD VENIPUNCTURE: CPT

## 2025-02-22 PROCEDURE — 82570 ASSAY OF URINE CREATININE: CPT

## 2025-02-22 PROCEDURE — 83036 HEMOGLOBIN GLYCOSYLATED A1C: CPT

## 2025-03-05 DIAGNOSIS — E11.9 TYPE 2 DIABETES MELLITUS WITHOUT COMPLICATION, WITHOUT LONG-TERM CURRENT USE OF INSULIN (HCC): ICD-10-CM

## 2025-03-05 DIAGNOSIS — E78.2 MIXED HYPERLIPIDEMIA: ICD-10-CM

## 2025-03-05 RX ORDER — ATORVASTATIN CALCIUM 40 MG/1
TABLET, FILM COATED ORAL
Qty: 90 TABLET | Refills: 0 | Status: SHIPPED | OUTPATIENT
Start: 2025-03-05

## 2025-03-05 RX ORDER — GLIMEPIRIDE 4 MG/1
4 TABLET ORAL 2 TIMES DAILY WITH MEALS
Qty: 180 TABLET | Refills: 0 | Status: SHIPPED | OUTPATIENT
Start: 2025-03-05

## 2025-03-05 NOTE — TELEPHONE ENCOUNTER
Atorvastatin 40 MG  Last time medication was refilled 12/12/2024  Last office visit  01/09/2025  Next office visit due/scheduled   Future Appointments   Date Time Provider Department Center   3/13/2025  2:30 PM Serafin Jack MD [facility]   4/4/2025  1:00 PM Olivia Landeros MD NP [facility]   4/4/2025  1:15 PM NP TX RN3 NP  Inf [facility]     Passed protocol, Medication sent.        Glimepiride 4 MG  Last time medication was refilled 12/12/2024  Last office visit  01/09/2025  Next office visit due/scheduled   Future Appointments   Date Time Provider Department Center   3/13/2025  2:30 PM Serafin Jack MD [facility]   4/4/2025  1:00 PM Olivia Landeros MD NP [facility]   4/4/2025  1:15 PM NP TX RN3 NP  Inf [facility]       Passed protocol, Medication sent.

## 2025-03-13 ENCOUNTER — OFFICE VISIT (OUTPATIENT)
Dept: INTERNAL MEDICINE CLINIC | Facility: CLINIC | Age: 60
End: 2025-03-13
Payer: COMMERCIAL

## 2025-03-13 VITALS
DIASTOLIC BLOOD PRESSURE: 74 MMHG | OXYGEN SATURATION: 99 % | HEART RATE: 78 BPM | SYSTOLIC BLOOD PRESSURE: 110 MMHG | BODY MASS INDEX: 28.36 KG/M2 | WEIGHT: 221 LBS | HEIGHT: 74 IN | RESPIRATION RATE: 16 BRPM

## 2025-03-13 DIAGNOSIS — S43.431A DEGENERATIVE SUPERIOR LABRAL ANTERIOR-TO-POSTERIOR (SLAP) TEAR OF RIGHT SHOULDER: ICD-10-CM

## 2025-03-13 DIAGNOSIS — Z12.11 COLON CANCER SCREENING: ICD-10-CM

## 2025-03-13 DIAGNOSIS — E11.8 DIABETES MELLITUS WITH COMPLICATION (HCC): ICD-10-CM

## 2025-03-13 DIAGNOSIS — I15.2 HYPERTENSION ASSOCIATED WITH DIABETES (HCC): ICD-10-CM

## 2025-03-13 DIAGNOSIS — Z00.00 ANNUAL PHYSICAL EXAM: Primary | ICD-10-CM

## 2025-03-13 DIAGNOSIS — E11.59 HYPERTENSION ASSOCIATED WITH DIABETES (HCC): ICD-10-CM

## 2025-03-13 PROCEDURE — 3061F NEG MICROALBUMINURIA REV: CPT | Performed by: INTERNAL MEDICINE

## 2025-03-13 PROCEDURE — 3044F HG A1C LEVEL LT 7.0%: CPT | Performed by: INTERNAL MEDICINE

## 2025-03-13 PROCEDURE — 3008F BODY MASS INDEX DOCD: CPT | Performed by: INTERNAL MEDICINE

## 2025-03-13 PROCEDURE — 3078F DIAST BP <80 MM HG: CPT | Performed by: INTERNAL MEDICINE

## 2025-03-13 PROCEDURE — 99396 PREV VISIT EST AGE 40-64: CPT | Performed by: INTERNAL MEDICINE

## 2025-03-13 PROCEDURE — 3074F SYST BP LT 130 MM HG: CPT | Performed by: INTERNAL MEDICINE

## 2025-03-13 NOTE — PROGRESS NOTES
Subjective:   Patient ID: Deniz Aguilera is a 59 year old male.    HPI  Deniz Aguilera is a 59 year old male who presents for a complete physical exam.   HPI:   Pt reports normal state of health    No issues with meds    Denies cp or sob  No fatigue    PAST MEDICAL, SOCIAL, FAMILY HISTORIES REVIEWED WITH PT      Immunization History   Administered Date(s) Administered    Covid-19 Vaccine Moderna 100 mcg/0.5 ml 03/15/2021, 04/12/2021, 08/19/2021    Covid-19 Vaccine Moderna 50 Mcg/0.25 Ml 06/18/2022    Covid-19 Vaccine Pfizer Bivalent 30mcg/0.3mL 02/10/2023, 06/30/2023    FLUZONE 6 months and older PFS 0.5 ml (70189) 10/06/2015, 12/09/2016, 10/12/2017, 10/18/2018    Fluarix 6 Months And Older 0.5 ml prefilled syringe (39549) 09/11/2020    Flublok Quad Influenza Vaccine (14710) 10/01/2021, 10/21/2023    Influenza 12/08/2012, 09/26/2018, 09/10/2019, 12/15/2022, 10/13/2023, 08/31/2024    Moderna Covid-19 Vaccine 50mcg/0.5ml 12yrs+ 08/31/2024    Pfizer Covid-19 Vaccine 30mcg/0.3ml 12yrs+ 10/21/2023    Pneumococcal (Prevnar 13) 11/07/2019    Pneumococcal Conjugate PCV20 07/28/2022    Pneumovax 23 12/09/2016    TDAP 04/06/2015    Zoster Vaccine Recombinant Adjuvanted (Shingrix) 07/03/2018, 09/21/2018     Wt Readings from Last 6 Encounters:   03/13/25 221 lb (100.2 kg)   01/17/25 219 lb 6.4 oz (99.5 kg)   01/09/25 222 lb (100.7 kg)   10/18/24 221 lb (100.2 kg)   09/13/24 218 lb (98.9 kg)   07/18/24 226 lb (102.5 kg)     Body mass index is 28.37 kg/m².     Lab Results   Component Value Date     (H) 02/22/2025     (H) 01/17/2025     (H) 10/18/2024     Lab Results   Component Value Date    CHOLEST 123 02/22/2025    CHOLEST 109 08/31/2024    CHOLEST 136 02/03/2024     Lab Results   Component Value Date    HDL 32 (L) 02/22/2025    HDL 29 (L) 08/31/2024    HDL 46 02/03/2024     Lab Results   Component Value Date    LDL 68 02/22/2025    LDL 52 08/31/2024    LDL 65 02/03/2024     Lab Results   Component Value Date     AST 24 02/22/2025    AST 27 01/17/2025    AST 40 (H) 10/18/2024     Lab Results   Component Value Date    ALT 31 02/22/2025    ALT 39 01/17/2025    ALT 51 (H) 10/18/2024     Lab Results   Component Value Date    PSA 0.33 04/07/2018    PSA 0.549 10/08/2016    PSA 0.466 10/03/2015        Current Outpatient Medications   Medication Sig Dispense Refill    GLIMEPIRIDE 4 MG Oral Tab TAKE 1 TABLET TWICE A DAY  WITH MEALS 180 tablet 0    ATORVASTATIN 40 MG Oral Tab TAKE 1 TABLET DAILY 90 tablet 0    dapagliflozin (FARXIGA) 10 MG Oral Tab Take 1 tablet (10 mg total) by mouth daily. 90 tablet 0    FLUCONAZOLE 100 MG Oral Tab TAKE 1 TABLET DAILY 30 tablet 5    Sildenafil Citrate 100 MG Oral Tab TAKE 1 TABLET DAILY AS     NEEDED FOR ERECTILE        DYSFUNCTION MAXIMUM DOSE   100MG IN 24 HOURS 18 tablet 3    METFORMIN 850 MG Oral Tab TAKE 1 TABLET TWICE A  tablet 0    ACYCLOVIR 400 MG Oral Tab TAKE 1 TABLET TWICE A  tablet 3    immune globulin, human, (GAMMAGARD) 20 g/200mL Injection Solution Inject every 6 months 20 g 2    Magnesium 200 MG Oral Tab Take 1 tablet (200 mg total) by mouth daily.      FLUTICASONE PROPIONATE DISKUS 250 MCG/ACT Inhalation Aerosol Powder, Breath Activated USE 1 INHALATION TWICE A    each 3    albuterol (PROAIR HFA) 108 (90 Base) MCG/ACT Inhalation Aero Soln INHALE 2 PUFFS BY MOUTH EVERY SIX HOURS AS NEEDED for wheezing 8.5 g 0    Acalabrutinib Maleate 100 MG Oral Tab Take 100 mg by mouth 2 (two) times daily. Take 1 tablet (100 mg) twice daily, approximately 12 hours apart, with water.  May be taken with or without food. 60 tablet 11    ondansetron (ZOFRAN) 8 MG tablet Take 1 tablet (8 mg total) by mouth every 8 (eight) hours as needed for Nausea. 30 tablet 3    B Complex-C-Folic Acid (VITALINE BIOTIN FORTE) 0.8 MG Oral Tab Take by mouth.      Prochlorperazine Maleate (COMPAZINE) 10 mg tablet TAKE 1 TABLET BY MOUTH EVERY SIX HOURS AS NEEDED FOR NAUSEA 30 tablet 3     Cyanocobalamin (VITAMIN B 12 OR) Take by mouth.      Fexofenadine HCl (WAL-FEX ALLERGY) 180 MG Oral Tab Take 1 tablet by mouth once daily. 90 tablet 1    Multiple Vitamins-Minerals (CENTRUM SILVER ULTRA MENS) Oral Tab Take  by mouth.        Past Medical History:    Back pain    Blood in urine    Cancer (HCC)    CLL-monitored by Dr. Leti Paulative tattoo    Diarrhea, unspecified    Essential hypertension, benign    Controlled     Extrinsic asthma, unspecified    High cholesterol    Kidney stone    Laboratory examination ordered as part of a routine general medical examination    Type II or unspecified type diabetes mellitus without mention of complication, not stated as uncontrolled    Unspecified essential hypertension    Visual impairment    glasses    Wears glasses      Past Surgical History:   Procedure Laterality Date    Colonoscopy      Colonoscopy      Other  05/27/2016    cervical lad      Family History   Problem Relation Age of Onset    Other (CHF) Maternal Grandmother     Other (Acute Myocardial Infarction) Paternal Grandfather     Asthma Mother     Diabetes Paternal Grandmother       Social History:  Social History     Socioeconomic History    Marital status:    Tobacco Use    Smoking status: Every Day     Types: Pipe    Smokeless tobacco: Never    Tobacco comments:     pipes and cigars   Vaping Use    Vaping status: Never Used   Substance and Sexual Activity    Alcohol use: Yes     Alcohol/week: 4.0 - 6.0 standard drinks of alcohol     Types: 4 - 6 Cans of beer per week     Comment: 3-4  Beers/week    Drug use: No   Other Topics Concern    Caffeine Concern Yes     Comment: 32-40 oz per day/coffee/tea    Exercise No      .   Exercise:  twice per week, three times per week.  Diet: watches fats closely and watches sugar closely     REVIEW OF SYSTEMS:   GENERAL: feels well otherwise  A comprehensive 10 point review of systems was completed.     Pertinent positives and negatives noted in the  HPI.      EXAM:   /74   Pulse 78   Resp 16   Ht 6' 2\" (1.88 m)   Wt 221 lb (100.2 kg)   SpO2 99%   BMI 28.37 kg/m²   Body mass index is 28.37 kg/m².   GENERAL: well developed, well nourished,in no apparent distress  SKIN: no rashes,no suspicious lesions  HEENT: atraumatic, normocephalic,ears and throat are clear  EYES:PERRLA, EOMI  NECK: supple,no adenopathy,no bruits  LUNGS: clear to auscultation  CARDIO: RRR without murmur  GI: good BS's,no masses, HSM or tenderness   deferred  MUSCULOSKELETAL: back is not tender,FROM of the back  EXTREMITIES: no cyanosis, clubbing or edema.  Bilateral barefoot skin diabetic exam is normal, visualized feet and the appearance is normal.  Bilateral monofilament/sensation of both feet is normal.  Pulsation pedal pulse exam of both lower legs/feet is normal as well.        ASSESSMENT AND PLAN:   Deniz Aguilera is a 59 year old male who presents for a complete physical exam.  Body mass index is 28.37 kg/m²., recommended low fat diet and aerobic exercise 30 minutes three times weekly.     Diabetes mellitus with complication (HCC) and Hypertension associated with diabetes (HCC) which are under control.    Health maintenance, we reviewed his at goal fasting Lipids, CMP, CBC and PSA,and a1.     Pt is up to date with screening colonoscopy.     Pt info handouts given for: exercise, low fat diet, testicular self exam and prostate cancer screening. The patient indicates understanding of these issues and agrees to the plan.  The patient is asked to return in 6 m.    History/Other:   Review of Systems  Current Outpatient Medications   Medication Sig Dispense Refill    GLIMEPIRIDE 4 MG Oral Tab TAKE 1 TABLET TWICE A DAY  WITH MEALS 180 tablet 0    ATORVASTATIN 40 MG Oral Tab TAKE 1 TABLET DAILY 90 tablet 0    dapagliflozin (FARXIGA) 10 MG Oral Tab Take 1 tablet (10 mg total) by mouth daily. 90 tablet 0    FLUCONAZOLE 100 MG Oral Tab TAKE 1 TABLET DAILY 30 tablet 5    Sildenafil  Citrate 100 MG Oral Tab TAKE 1 TABLET DAILY AS     NEEDED FOR ERECTILE        DYSFUNCTION MAXIMUM DOSE   100MG IN 24 HOURS 18 tablet 3    METFORMIN 850 MG Oral Tab TAKE 1 TABLET TWICE A  tablet 0    ACYCLOVIR 400 MG Oral Tab TAKE 1 TABLET TWICE A  tablet 3    immune globulin, human, (GAMMAGARD) 20 g/200mL Injection Solution Inject every 6 months 20 g 2    Magnesium 200 MG Oral Tab Take 1 tablet (200 mg total) by mouth daily.      FLUTICASONE PROPIONATE DISKUS 250 MCG/ACT Inhalation Aerosol Powder, Breath Activated USE 1 INHALATION TWICE A    each 3    albuterol (PROAIR HFA) 108 (90 Base) MCG/ACT Inhalation Aero Soln INHALE 2 PUFFS BY MOUTH EVERY SIX HOURS AS NEEDED for wheezing 8.5 g 0    Acalabrutinib Maleate 100 MG Oral Tab Take 100 mg by mouth 2 (two) times daily. Take 1 tablet (100 mg) twice daily, approximately 12 hours apart, with water.  May be taken with or without food. 60 tablet 11    ondansetron (ZOFRAN) 8 MG tablet Take 1 tablet (8 mg total) by mouth every 8 (eight) hours as needed for Nausea. 30 tablet 3    B Complex-C-Folic Acid (VITALINE BIOTIN FORTE) 0.8 MG Oral Tab Take by mouth.      Prochlorperazine Maleate (COMPAZINE) 10 mg tablet TAKE 1 TABLET BY MOUTH EVERY SIX HOURS AS NEEDED FOR NAUSEA 30 tablet 3    Cyanocobalamin (VITAMIN B 12 OR) Take by mouth.      Fexofenadine HCl (WAL-FEX ALLERGY) 180 MG Oral Tab Take 1 tablet by mouth once daily. 90 tablet 1    Multiple Vitamins-Minerals (CENTRUM SILVER ULTRA MENS) Oral Tab Take  by mouth.       Allergies:Allergies[1]    Objective:   Physical Exam    Assessment & Plan:   1. Annual physical exam    2. Diabetes mellitus with complication (HCC)    3. Hypertension associated with diabetes (HCC)    4. Degenerative superior labral anterior-to-posterior (SLAP) tear of right shoulder    5. Colon cancer screening        Orders Placed This Encounter   Procedures    Occult Blood, Fecal, Immunoassay (Blue cards) [E]       Meds This  Visit:  Requested Prescriptions      No prescriptions requested or ordered in this encounter       Imaging & Referrals:  ORTHOPEDIC - INTERNAL         [1]   Allergies  Allergen Reactions    Codeine OTHER (SEE COMMENTS)     nausea    Dust SHORTNESS OF BREATH    Mold SHORTNESS OF BREATH    Pollen SHORTNESS OF BREATH

## 2025-03-23 DIAGNOSIS — C91.10 CLL (CHRONIC LYMPHOCYTIC LEUKEMIA) (HCC): ICD-10-CM

## 2025-03-24 RX ORDER — FLUCONAZOLE 100 MG/1
100 TABLET ORAL DAILY
Qty: 30 TABLET | Refills: 11 | Status: SHIPPED | OUTPATIENT
Start: 2025-03-24

## 2025-03-24 RX ORDER — FLUCONAZOLE 100 MG/1
100 TABLET ORAL DAILY
Qty: 30 TABLET | Refills: 0 | Status: SHIPPED | OUTPATIENT
Start: 2025-03-24 | End: 2025-03-24

## 2025-04-04 ENCOUNTER — OFFICE VISIT (OUTPATIENT)
Age: 60
End: 2025-04-04
Attending: INTERNAL MEDICINE
Payer: COMMERCIAL

## 2025-04-04 VITALS
RESPIRATION RATE: 16 BRPM | WEIGHT: 221.38 LBS | OXYGEN SATURATION: 96 % | DIASTOLIC BLOOD PRESSURE: 74 MMHG | SYSTOLIC BLOOD PRESSURE: 112 MMHG | HEIGHT: 74.02 IN | BODY MASS INDEX: 28.41 KG/M2 | HEART RATE: 78 BPM

## 2025-04-04 DIAGNOSIS — R74.01 TRANSAMINITIS: ICD-10-CM

## 2025-04-04 DIAGNOSIS — H20.9 IRITIS: ICD-10-CM

## 2025-04-04 DIAGNOSIS — T88.7XXA MEDICATION SIDE EFFECTS: ICD-10-CM

## 2025-04-04 DIAGNOSIS — D80.1 HYPOGAMMAGLOBULINEMIA (HCC): ICD-10-CM

## 2025-04-04 DIAGNOSIS — C91.10 CLL (CHRONIC LYMPHOCYTIC LEUKEMIA) (HCC): Primary | ICD-10-CM

## 2025-04-04 DIAGNOSIS — H15.001 SCLERITIS OF RIGHT EYE: Primary | ICD-10-CM

## 2025-04-04 DIAGNOSIS — C91.10 CLL (CHRONIC LYMPHOCYTIC LEUKEMIA) (HCC): ICD-10-CM

## 2025-04-04 LAB
ALBUMIN SERPL-MCNC: 4.8 G/DL (ref 3.2–4.8)
ALBUMIN/GLOB SERPL: 3.2 {RATIO} (ref 1–2)
ALP LIVER SERPL-CCNC: 84 U/L
ALT SERPL-CCNC: 39 U/L
ANION GAP SERPL CALC-SCNC: 13 MMOL/L (ref 0–18)
AST SERPL-CCNC: 28 U/L (ref ?–34)
BASOPHILS # BLD AUTO: 0.09 X10(3) UL (ref 0–0.2)
BASOPHILS NFR BLD AUTO: 1 %
BILIRUB SERPL-MCNC: 0.3 MG/DL (ref 0.3–1.2)
BUN BLD-MCNC: 19 MG/DL (ref 9–23)
CALCIUM BLD-MCNC: 9.8 MG/DL (ref 8.7–10.6)
CHLORIDE SERPL-SCNC: 101 MMOL/L (ref 98–112)
CO2 SERPL-SCNC: 22 MMOL/L (ref 21–32)
CREAT BLD-MCNC: 1.13 MG/DL
EGFRCR SERPLBLD CKD-EPI 2021: 75 ML/MIN/1.73M2 (ref 60–?)
EOSINOPHIL # BLD AUTO: 0.13 X10(3) UL (ref 0–0.7)
EOSINOPHIL NFR BLD AUTO: 1.4 %
ERYTHROCYTE [DISTWIDTH] IN BLOOD BY AUTOMATED COUNT: 12.5 %
GLOBULIN PLAS-MCNC: 1.5 G/DL (ref 2–3.5)
GLUCOSE BLD-MCNC: 218 MG/DL (ref 70–99)
HCT VFR BLD AUTO: 47.2 %
HGB BLD-MCNC: 16.2 G/DL
IMM GRANULOCYTES # BLD AUTO: 0.03 X10(3) UL (ref 0–1)
IMM GRANULOCYTES NFR BLD: 0.3 %
IMMUNOGLOBULIN PNL SER-MCNC: 310 MG/DL (ref 650–1600)
LDH SERPL L TO P-CCNC: 199 U/L
LYMPHOCYTES # BLD AUTO: 4.4 X10(3) UL (ref 1–4)
LYMPHOCYTES NFR BLD AUTO: 47.9 %
MCH RBC QN AUTO: 31.6 PG (ref 26–34)
MCHC RBC AUTO-ENTMCNC: 34.3 G/DL (ref 31–37)
MCV RBC AUTO: 92.2 FL
MONOCYTES # BLD AUTO: 0.42 X10(3) UL (ref 0.1–1)
MONOCYTES NFR BLD AUTO: 4.6 %
NEUTROPHILS # BLD AUTO: 4.11 X10 (3) UL (ref 1.5–7.7)
NEUTROPHILS # BLD AUTO: 4.11 X10(3) UL (ref 1.5–7.7)
NEUTROPHILS NFR BLD AUTO: 44.8 %
OSMOLALITY SERPL CALC.SUM OF ELEC: 291 MOSM/KG (ref 275–295)
PLATELET # BLD AUTO: 152 10(3)UL (ref 150–450)
POTASSIUM SERPL-SCNC: 4.2 MMOL/L (ref 3.5–5.1)
PROT SERPL-MCNC: 6.3 G/DL (ref 5.7–8.2)
RBC # BLD AUTO: 5.12 X10(6)UL
SODIUM SERPL-SCNC: 136 MMOL/L (ref 136–145)
WBC # BLD AUTO: 9.2 X10(3) UL (ref 4–11)

## 2025-04-04 RX ORDER — DIPHENHYDRAMINE HCL 25 MG
25 CAPSULE ORAL ONCE
Status: COMPLETED | OUTPATIENT
Start: 2025-04-04 | End: 2025-04-04

## 2025-04-04 RX ORDER — ACETAMINOPHEN 325 MG/1
650 TABLET ORAL ONCE
Status: COMPLETED | OUTPATIENT
Start: 2025-04-04 | End: 2025-04-04

## 2025-04-04 RX ORDER — DIPHENHYDRAMINE HCL 25 MG
25 CAPSULE ORAL ONCE
OUTPATIENT
Start: 2025-04-04

## 2025-04-04 RX ORDER — ACETAMINOPHEN 325 MG/1
650 TABLET ORAL ONCE
OUTPATIENT
Start: 2025-04-04

## 2025-04-04 RX ADMIN — ACETAMINOPHEN 650 MG: 325 TABLET ORAL at 13:45:00

## 2025-04-04 RX ADMIN — DIPHENHYDRAMINE HCL 25 MG: 25 MG CAPSULE ORAL at 13:45:00

## 2025-04-04 NOTE — PROGRESS NOTES
[facility] Progress Note    Patient Name: Deniz Aguilera   YOB: 1965   Medical Record Number: [id number]   CSN: [id number]   Attending Physician: Olivia Landeros M.D.   Referring Physician: Serafin Jack MD      Date of Visit: 4/4/2025     Chief Complaint:  Chief Complaint   Patient presents with    Follow - Up     CLL pt here for f/u and IVIG. He continues on acalabrutinib without difficulty. He is feeling well, no c/o today. Had URI in January.  No other issues.         Diagnosis:  CLL (originally diagnosed with stage II CLL at presentation with lymphocytosis with lymphadenopathy) diagnosed 7/2013     - noted increase in neck nodes and underwent excisional biopsy of a right cervical lymph node 5/27/16. Pathology revealed CLL/SLL- no evidence of transformation.     -  del(17p), TP53, IGHV mutation negative         - started Ibrutinib 11/7/18.     Presented to the ED 9/1/19 c/o \"feeling like dirt\" with fever, sore throat, chills, body aches, sinus headaches. No appetite with PO intake poor last few days. CT head showed no acute findings. Felt to have possible viral syndrome with dehydration and was sent home. He then developed diarrhea and took imodium. Has not had a BM last 2 days. Reports intermittent \"bloody boogers\" but no bleeding elsewhere.      [facility] APNs and was noted to have thrombocytopenia and elevated transaminases. Was admitted for further w/u and management. Took up to 7 tabs of tylenol one day- not sure of dose. No ETOH lately or illicit drug use. No known h/o hepatitis. Does have fatty liver seen on previous imaging.      Ibrutinib was held and was hydrated. Bacterial cultures and respiratory panel were negative. LDH was elevated at 1050. Viral titers were sent (EBV/HSV and CMV). EBV IgG and nuclear AG ABS positive but IgM negative. IgG levels were 179. Acute hepatitis panel was negative. No significant schistocytes seen on smear.     He was given  IVIG for hypogammaglobulinemia. CT C/A/P showed splenomegaly with decrease in known nodes. Slowly improved and was discharged home 9/7/19.     - Resumed Ibrutinib 10/4/19     - Switched to Acalabrutinib about 6/2024 due to iritis/uveitis      History of Present Illness:  Doing well he says. Continues on acalabrutinib w/o difficulty he said. No further recurrent episodes of iritis/uveitis since he switched. Did have a conjunctival hemorrhage once and used steroid drops for 2 days and went away. No fevers.  Chronic night sweats. Denies change in urinary habits. No bleeding or palpitations. Appetite good. No B symptoms (intentional weight loss) or rash. Has started walking.     Current Medications:    Current Outpatient Medications:     fluconazole 100 MG Oral Tab, Take 1 tablet (100 mg total) by mouth daily., Disp: 30 tablet, Rfl: 11    GLIMEPIRIDE 4 MG Oral Tab, TAKE 1 TABLET TWICE A DAY  WITH MEALS, Disp: 180 tablet, Rfl: 0    ATORVASTATIN 40 MG Oral Tab, TAKE 1 TABLET DAILY, Disp: 90 tablet, Rfl: 0    dapagliflozin (FARXIGA) 10 MG Oral Tab, Take 1 tablet (10 mg total) by mouth daily., Disp: 90 tablet, Rfl: 0    Sildenafil Citrate 100 MG Oral Tab, TAKE 1 TABLET DAILY AS     NEEDED FOR ERECTILE        DYSFUNCTION MAXIMUM DOSE   100MG IN 24 HOURS, Disp: 18 tablet, Rfl: 3    METFORMIN 850 MG Oral Tab, TAKE 1 TABLET TWICE A DAY, Disp: 180 tablet, Rfl: 0    ACYCLOVIR 400 MG Oral Tab, TAKE 1 TABLET TWICE A DAY, Disp: 180 tablet, Rfl: 3    immune globulin, human, (GAMMAGARD) 20 g/200mL Injection Solution, Inject every 6 months, Disp: 20 g, Rfl: 2    Magnesium 200 MG Oral Tab, Take 1 tablet (200 mg total) by mouth daily., Disp: , Rfl:     FLUTICASONE PROPIONATE DISKUS 250 MCG/ACT Inhalation Aerosol Powder, Breath Activated, USE 1 INHALATION TWICE A   DAY, Disp: 180 each, Rfl: 3    Acalabrutinib Maleate 100 MG Oral Tab, Take 100 mg by mouth 2 (two) times daily. Take 1 tablet (100 mg) twice daily, approximately 12 hours  apart, with water.  May be taken with or without food., Disp: 60 tablet, Rfl: 11    ondansetron (ZOFRAN) 8 MG tablet, Take 1 tablet (8 mg total) by mouth every 8 (eight) hours as needed for Nausea., Disp: 30 tablet, Rfl: 3    B Complex-C-Folic Acid (VITALINE BIOTIN FORTE) 0.8 MG Oral Tab, Take by mouth., Disp: , Rfl:     Prochlorperazine Maleate (COMPAZINE) 10 mg tablet, TAKE 1 TABLET BY MOUTH EVERY SIX HOURS AS NEEDED FOR NAUSEA, Disp: 30 tablet, Rfl: 3    Cyanocobalamin (VITAMIN B 12 OR), Take by mouth., Disp: , Rfl:     Fexofenadine HCl (WAL-FEX ALLERGY) 180 MG Oral Tab, Take 1 tablet by mouth once daily., Disp: 90 tablet, Rfl: 1    Multiple Vitamins-Minerals (CENTRUM SILVER ULTRA MENS) Oral Tab, Take  by mouth., Disp: , Rfl:     Past Medical History:  Past Medical History:    Back pain    Blood in urine    Cancer (HCC)    CLL-monitored by Dr. Leti Gaitan tattoo    Diarrhea, unspecified    Essential hypertension, benign    Controlled     Extrinsic asthma, unspecified    High cholesterol    Kidney stone    Laboratory examination ordered as part of a routine general medical examination    Type II or unspecified type diabetes mellitus without mention of complication, not stated as uncontrolled    Unspecified essential hypertension    Visual impairment    glasses    Wears glasses       Past Surgical History:  Past Surgical History:   Procedure Laterality Date    Colonoscopy      Colonoscopy      Other  05/27/2016    cervical lad       Family Medical History:  Family History   Problem Relation Age of Onset    Other (CHF) Maternal Grandmother     Other (Acute Myocardial Infarction) Paternal Grandfather     Asthma Mother     Diabetes Paternal Grandmother        Psychosocial History:  Social History     Socioeconomic History    Marital status:      Spouse name: Not on file    Number of children: Not on file    Years of education: Not on file    Highest education level: Not on file   Occupational History     Not on file   Tobacco Use    Smoking status: Every Day     Types: Pipe    Smokeless tobacco: Never    Tobacco comments:     pipes and cigars   Vaping Use    Vaping status: Never Used   Substance and Sexual Activity    Alcohol use: Yes     Alcohol/week: 4.0 - 6.0 standard drinks of alcohol     Types: 4 - 6 Cans of beer per week     Comment: 3-4  Beers/week    Drug use: No    Sexual activity: Not on file   Other Topics Concern     Service Not Asked    Blood Transfusions Not Asked    Caffeine Concern Yes     Comment: 32-40 oz per day/coffee/tea    Occupational Exposure Not Asked    Hobby Hazards Not Asked    Sleep Concern Not Asked    Stress Concern Not Asked    Weight Concern Not Asked    Special Diet Not Asked    Back Care Not Asked    Exercise No    Bike Helmet Not Asked    Seat Belt Not Asked    Self-Exams Not Asked   Social History Narrative    Not on file     Social Drivers of Health     Food Insecurity: Not on file   Transportation Needs: Not on file   Housing Stability: Not on file         Allergies:  Allergies   Allergen Reactions    Codeine OTHER (SEE COMMENTS)     nausea    Dust SHORTNESS OF BREATH    Mold SHORTNESS OF BREATH    Pollen SHORTNESS OF BREATH        Review of Systems:  A 14-point ROS was done with pertinent positives and negative per the HPI    Vital Signs:  Height: 188 cm (6' 2.02\") (04/04 1253)  Weight: 100.4 kg (221 lb 6.4 oz) (04/04 1253)  BSA (Calculated - sq m): 2.27 sq meters (04/04 1253)  Pulse: 78 (04/04 1253)  BP: 112/74 (04/04 1253)  Temp: --  Do Not Use - Resp Rate: --  SpO2: 96 % (04/04 1253)      Physical Examination:  General: Patient is alert and oriented x 3, not in acute distress.  Psych:  Mood and affect appropriate  HEENT: EOMs intact. PERRL. Oropharynx is clear. Neck: No JVD. No significant adenopathy.   Neck is supple.  Chest: Clear to auscultation.  Heart: Regular rate and rhythm.   Abdomen: Soft, non tender with good bowel sounds.  No hepatosplenomegaly. No  palpable mass.  Extremities: Pedal pulses are present. No BLE edema.   Neurological: Grossly intact.          Laboratory:  Recent Results (from the past 24 hours)   CBC W/DIFF [E]    Collection Time: 04/04/25 12:59 PM   Result Value Ref Range    WBC 9.2 4.0 - 11.0 x10(3) uL    RBC 5.12 4.30 - 5.70 x10(6)uL    HGB 16.2 13.0 - 17.5 g/dL    HCT 47.2 39.0 - 53.0 %    .0 150.0 - 450.0 10(3)uL    MCV 92.2 80.0 - 100.0 fL    MCH 31.6 26.0 - 34.0 pg    MCHC 34.3 31.0 - 37.0 g/dL    RDW 12.5 %    Neutrophil Absolute Prelim 4.11 1.50 - 7.70 x10 (3) uL    Neutrophil Absolute 4.11 1.50 - 7.70 x10(3) uL    Lymphocyte Absolute 4.40 (H) 1.00 - 4.00 x10(3) uL    Monocyte Absolute 0.42 0.10 - 1.00 x10(3) uL    Eosinophil Absolute 0.13 0.00 - 0.70 x10(3) uL    Basophil Absolute 0.09 0.00 - 0.20 x10(3) uL    Immature Granulocyte Absolute 0.03 0.00 - 1.00 x10(3) uL    Neutrophil % 44.8 %    Lymphocyte % 47.9 %    Monocyte % 4.6 %    Eosinophil % 1.4 %    Basophil % 1.0 %    Immature Granulocyte % 0.3 %   LDH [E]    Collection Time: 04/04/25 12:59 PM   Result Value Ref Range     120 - 246 U/L   COMP METABOLIC PANEL [E]    Collection Time: 04/04/25 12:59 PM   Result Value Ref Range    Glucose 218 (H) 70 - 99 mg/dL    Sodium 136 136 - 145 mmol/L    Potassium 4.2 3.5 - 5.1 mmol/L    Chloride 101 98 - 112 mmol/L    CO2 22.0 21.0 - 32.0 mmol/L    Anion Gap 13 0 - 18 mmol/L    BUN 19 9 - 23 mg/dL    Creatinine 1.13 0.70 - 1.30 mg/dL    Calcium, Total 9.8 8.7 - 10.6 mg/dL    Calculated Osmolality 291 275 - 295 mOsm/kg    eGFR-Cr 75 >=60 mL/min/1.73m2    AST 28 <34 U/L    ALT 39 10 - 49 U/L    Alkaline Phosphatase 84 45 - 117 U/L    Bilirubin, Total 0.3 0.3 - 1.2 mg/dL    Total Protein 6.3 5.7 - 8.2 g/dL    Albumin 4.8 3.2 - 4.8 g/dL    Globulin  1.5 (L) 2.0 - 3.5 g/dL    A/G Ratio 3.2 (H) 1.0 - 2.0    Patient Fasting for CMP? Patient not present    IMMUNOGLOBULIN G, QN, SERUM [E]    Collection Time: 04/04/25 12:59 PM   Result  Value Ref Range    Immunoglobulin G 310 (L) 650 - 1,600 mg/dL     Radiology:  PROCEDURE:  US SCROTUM W/ DOPPLER (CPT=93975/94704)     COMPARISON:  None.     INDICATIONS:  N50.89 Testicle swelling N50.811 Pain in right testicle     TECHNIQUE:  Real time grey scale ultrasound was performed of the scrotal contents including the testicles, epididymis, spermatic cord, and scrotal wall. A duplex scan with B-mode, Doppler color flow, and spectral analysis were also performed.     PATIENT STATED HISTORY: (As transcribed by Technologist)  Patient c/o left scrotal swelling.         FINDINGS:    TESTES:  The right testicle is located superiorly to the scrotal sac within the right inguinal region.  No focal testicular lesions on the right or left noted.  There are however multiple small nonspecific parenchymal calcifications.  EPIDIDYMIS:  Negative.  HYDROCELE:  Moderate bilateral hydroceles.  VARICOCELE:  Negative  OTHER:  Negative.                   Impression   CONCLUSION:    1. Moderate bilateral hydroceles.  2. The right testicle is superiorly displaced within the right inguinal canal, superior to the scrotal sac.        LOCATION:  [facility]           Dictated by (CST): Polo Lim DO on 1/26/2024 at 4:44 PM      Finalized by (CST): Polo Lim DO on 1/26/2024 at 4:47 PM           Impression and Plan:  1. CLL- switched to [place] due to iritis/uveitis. Tolerating well with manageable side effects.   Counts normal but lymphs slightly increased! No bleeding. Continued monitoring for possible AEs. He feels well. Will recheck CBC in 3 months and continue on [place]    2. Elevated LFTs/pancreatitis-   had bout of pancreatitis from alcohol. Advised moderation if not stop altogether. He says what happened was not typical for him. Transaminases normal today.Also with fatty liver.     3. Hypogammaglobulinemia- was on monthly IVIG and tolerated this well. As has not had recent severe (bacterial) infections for some time started spaced  out infusions to Q 8-> 12 weeks--> 6 months.  He is aware of signs and symptoms to watch out for.     4. 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 Work-up for possible  autoimmune disorders and other potential etiology negative. Continues to be followed by ophth. No further episodes on acala      Labs reviewed with him    Continue [place] and IVIG    Labs in 3 months. IVIG Q 6 months    RTC 6 months    Risk level high- CLL on targeted therapy and IVIG    Emotional Well Being:  I have assessed the patient's emotional well-being and any concerns about anxiety or depression.  We discussed issues of distress, coping difficulties and social support systems and currently there are no active problems.      Olivia Landeros MD  [facility]

## 2025-04-04 NOTE — PROGRESS NOTES
Education Record    Learner:  Patient    Disease / Diagnosis: CLL    Barriers / Limitations:  None   Comments:    Method:  Brief focused and Reinforcement   Comments:    General Topics:  Plan of care reviewed   Comments:    Outcome:  Shows understanding   Comments:    Patient tolerated IVIG and discharged in stable condition.

## 2025-04-15 DIAGNOSIS — E11.9 TYPE 2 DIABETES MELLITUS WITHOUT COMPLICATION, WITHOUT LONG-TERM CURRENT USE OF INSULIN (HCC): ICD-10-CM

## 2025-04-16 NOTE — TELEPHONE ENCOUNTER
Last time medication was refilled: 1/13/25  Next office visit due/scheduled: 9/12/25  Last office visit: 3/13/25  Last Labs: 2/22/25

## 2025-04-19 DIAGNOSIS — C91.10 CLL (CHRONIC LYMPHOCYTIC LEUKEMIA) (HCC): ICD-10-CM

## 2025-04-21 RX ORDER — FLUCONAZOLE 100 MG/1
100 TABLET ORAL DAILY
Qty: 30 TABLET | Refills: 0 | Status: SHIPPED | OUTPATIENT
Start: 2025-04-21 | End: 2025-05-15

## 2025-04-24 DIAGNOSIS — C91.10 CLL (CHRONIC LYMPHOCYTIC LEUKEMIA) (HCC): ICD-10-CM

## 2025-04-29 DIAGNOSIS — E11.9 TYPE 2 DIABETES MELLITUS WITHOUT COMPLICATION, WITHOUT LONG-TERM CURRENT USE OF INSULIN (HCC): ICD-10-CM

## 2025-04-29 RX ORDER — DAPAGLIFLOZIN 10 MG/1
10 TABLET, FILM COATED ORAL DAILY
Qty: 90 TABLET | Refills: 0 | Status: SHIPPED | OUTPATIENT
Start: 2025-04-29

## 2025-04-29 NOTE — TELEPHONE ENCOUNTER
Patient called requesting refill     dapagliflozin (FARXIGA) 10 MG Oral Tab     To be sent to [facility]    Esdras to prescribe?     Future Appointments   Date Time Provider Department Center   9/12/2025  2:45 PM Serafin Jack MD [facility] [address] [facility] [address]   10/10/2025  8:00 AM NP OOT NP  Inf [facility]   10/10/2025  8:30 AM Olivia Landeros MD [facility]   10/10/2025  9:00 AM NP [place] RN3 NP [facility]

## 2025-05-15 DIAGNOSIS — C91.10 CLL (CHRONIC LYMPHOCYTIC LEUKEMIA) (HCC): ICD-10-CM

## 2025-05-15 RX ORDER — FLUCONAZOLE 100 MG/1
100 TABLET ORAL DAILY
Qty: 30 TABLET | Refills: 2 | Status: SHIPPED | OUTPATIENT
Start: 2025-05-15

## 2025-05-30 DIAGNOSIS — E11.9 TYPE 2 DIABETES MELLITUS WITHOUT COMPLICATION, WITHOUT LONG-TERM CURRENT USE OF INSULIN (HCC): ICD-10-CM

## 2025-05-30 DIAGNOSIS — E78.2 MIXED HYPERLIPIDEMIA: ICD-10-CM

## 2025-05-30 RX ORDER — ATORVASTATIN CALCIUM 40 MG/1
40 TABLET, FILM COATED ORAL DAILY
Qty: 90 TABLET | Refills: 0 | Status: SHIPPED | OUTPATIENT
Start: 2025-05-30

## 2025-05-30 RX ORDER — GLIMEPIRIDE 4 MG/1
4 TABLET ORAL 2 TIMES DAILY WITH MEALS
Qty: 180 TABLET | Refills: 0 | Status: SHIPPED | OUTPATIENT
Start: 2025-05-30

## 2025-05-30 NOTE — TELEPHONE ENCOUNTER
Glimepiride  Last time medication was refilled 3/5/25  Last office visit  3/13/25  Next office visit due/scheduled   Future Appointments   Date Time Provider Department Center   9/12/2025  2:45 PM Serafin Jack MD [facility]   10/10/2025  8:00 AM NP OOT NP  [facility]   10/10/2025  8:30 AM Olivia Landeros MD NP  [facility]   10/10/2025  9:00 AM NP TX RN3 NP  [facility]   Passed protocol, Medication sent.      Atorvastatin  Last time medication was refilled 3/5/25  Last office visit  3/13/25  Next office visit due/scheduled   Future Appointments   Date Time Provider Department Center   9/12/2025  2:45 PM Serafin Jack MD [facility]   10/10/2025  8:00 AM NP OOT NP  [facility]   10/10/2025  8:30 AM Olivia Landeros MD NP  [facility]   10/10/2025  9:00 AM NP TX RN3 NP  [facility]   Passed protocol, Medication sent.

## 2025-06-25 DIAGNOSIS — J45.40 MODERATE PERSISTENT ASTHMA WITHOUT COMPLICATION (HCC): ICD-10-CM

## 2025-06-25 DIAGNOSIS — E11.9 TYPE 2 DIABETES MELLITUS WITHOUT COMPLICATION, WITHOUT LONG-TERM CURRENT USE OF INSULIN (HCC): ICD-10-CM

## 2025-06-26 RX ORDER — FLUTICASONE PROPIONATE 250 UG/1
1 POWDER, METERED RESPIRATORY (INHALATION) 2 TIMES DAILY
Qty: 1 EACH | Refills: 3 | Status: SHIPPED | OUTPATIENT
Start: 2025-06-26

## 2025-06-26 NOTE — TELEPHONE ENCOUNTER
Metformin  Last time medication was refilled 4/15/25  Last office visit  3/13/25  Next office visit due/scheduled   Future Appointments   Date Time Provider Department Center   9/12/2025  2:45 PM Serafin Jack MD [facility]   10/10/2025  8:00 AM NP OOT NP [facility]   10/10/2025  8:30 AM Olivia Landeros MD NP [facility]   10/10/2025  9:00 AM NP TX RN3 NP [facility]   Passed protocol, Medication sent.      Flonase  Last time medication was refilled 9/13/24  Last office visit  3/13/25  Next office visit due/scheduled   Future Appointments   Date Time Provider Department Center   9/12/2025  2:45 PM Serafin Jack MD [facility]   10/10/2025  8:00 AM NP OOT NP [facility]   10/10/2025  8:30 AM Olivia Landeros MD NP [facility]   10/10/2025  9:00 AM NP TX RN3 NP [facility]   Passed protocol, Medication sent.

## 2025-06-27 ENCOUNTER — LAB ENCOUNTER (OUTPATIENT)
Dept: LAB | Age: 60
End: 2025-06-27
Attending: INTERNAL MEDICINE
Payer: COMMERCIAL

## 2025-06-27 DIAGNOSIS — C91.10 CLL (CHRONIC LYMPHOCYTIC LEUKEMIA) (HCC): ICD-10-CM

## 2025-06-27 DIAGNOSIS — D80.1 HYPOGAMMAGLOBULINEMIA (HCC): ICD-10-CM

## 2025-06-27 DIAGNOSIS — D80.1 HYPOGAMMAGLOBULINEMIA (HCC): Primary | ICD-10-CM

## 2025-06-27 LAB
BASOPHILS # BLD AUTO: 0.07 X10(3) UL (ref 0–0.2)
BASOPHILS NFR BLD AUTO: 0.7 %
EOSINOPHIL # BLD AUTO: 0.17 X10(3) UL (ref 0–0.7)
EOSINOPHIL NFR BLD AUTO: 1.7 %
ERYTHROCYTE [DISTWIDTH] IN BLOOD BY AUTOMATED COUNT: 12.4 %
HCT VFR BLD AUTO: 45.4 % (ref 39–53)
HGB BLD-MCNC: 15.1 G/DL (ref 13–17.5)
IMM GRANULOCYTES # BLD AUTO: 0.02 X10(3) UL (ref 0–1)
IMM GRANULOCYTES NFR BLD: 0.2 %
IMMUNOGLOBULIN PNL SER-MCNC: 332 MG/DL (ref 650–1600)
LYMPHOCYTES # BLD AUTO: 6.06 X10(3) UL (ref 1–4)
LYMPHOCYTES NFR BLD AUTO: 61.8 %
MCH RBC QN AUTO: 31.4 PG (ref 26–34)
MCHC RBC AUTO-ENTMCNC: 33.3 G/DL (ref 31–37)
MCV RBC AUTO: 94.4 FL (ref 80–100)
MONOCYTES # BLD AUTO: 0.44 X10(3) UL (ref 0.1–1)
MONOCYTES NFR BLD AUTO: 4.5 %
NEUTROPHILS # BLD AUTO: 3.05 X10 (3) UL (ref 1.5–7.7)
NEUTROPHILS # BLD AUTO: 3.05 X10(3) UL (ref 1.5–7.7)
NEUTROPHILS NFR BLD AUTO: 31.1 %
PLATELET # BLD AUTO: 144 10(3)UL (ref 150–450)
RBC # BLD AUTO: 4.81 X10(6)UL (ref 4.3–5.7)
WBC # BLD AUTO: 9.8 X10(3) UL (ref 4–11)

## 2025-06-27 PROCEDURE — 82784 ASSAY IGA/IGD/IGG/IGM EACH: CPT

## 2025-06-27 PROCEDURE — 85025 COMPLETE CBC W/AUTO DIFF WBC: CPT

## 2025-06-27 PROCEDURE — 83036 HEMOGLOBIN GLYCOSYLATED A1C: CPT

## 2025-06-27 PROCEDURE — 36415 COLL VENOUS BLD VENIPUNCTURE: CPT

## 2025-06-28 LAB
EST. AVERAGE GLUCOSE BLD GHB EST-MCNC: 157 MG/DL (ref 68–126)
HBA1C MFR BLD: 7.1 % (ref ?–5.7)

## 2025-08-10 ENCOUNTER — PATIENT MESSAGE (OUTPATIENT)
Dept: INTERNAL MEDICINE CLINIC | Facility: CLINIC | Age: 60
End: 2025-08-10

## 2025-08-10 DIAGNOSIS — E11.9 TYPE 2 DIABETES MELLITUS WITHOUT COMPLICATION, WITHOUT LONG-TERM CURRENT USE OF INSULIN (HCC): ICD-10-CM

## 2025-08-12 DIAGNOSIS — E11.9 TYPE 2 DIABETES MELLITUS WITHOUT COMPLICATION, WITHOUT LONG-TERM CURRENT USE OF INSULIN (HCC): ICD-10-CM

## 2025-08-12 RX ORDER — DAPAGLIFLOZIN 10 MG/1
10 TABLET, FILM COATED ORAL DAILY
Qty: 90 TABLET | Refills: 0 | OUTPATIENT
Start: 2025-08-12

## 2025-08-12 RX ORDER — DAPAGLIFLOZIN 10 MG/1
10 TABLET, FILM COATED ORAL DAILY
Qty: 90 TABLET | Refills: 0 | Status: SHIPPED | OUTPATIENT
Start: 2025-08-12

## 2025-08-14 ENCOUNTER — TELEPHONE (OUTPATIENT)
Dept: INTERNAL MEDICINE CLINIC | Facility: CLINIC | Age: 60
End: 2025-08-14

## 2025-08-23 ENCOUNTER — LAB ENCOUNTER (OUTPATIENT)
Dept: LAB | Age: 60
End: 2025-08-23
Attending: INTERNAL MEDICINE

## 2025-08-23 DIAGNOSIS — E11.9 TYPE 2 DIABETES MELLITUS WITHOUT COMPLICATION, WITHOUT LONG-TERM CURRENT USE OF INSULIN (HCC): ICD-10-CM

## 2025-08-23 LAB
ALBUMIN SERPL-MCNC: 4.8 G/DL (ref 3.2–4.8)
ALBUMIN/GLOB SERPL: 2.8 (ref 1–2)
ALP LIVER SERPL-CCNC: 51 U/L (ref 45–117)
ALT SERPL-CCNC: 37 U/L (ref 10–49)
ANION GAP SERPL CALC-SCNC: 10 MMOL/L (ref 0–18)
AST SERPL-CCNC: 29 U/L (ref ?–34)
BILIRUB SERPL-MCNC: 0.4 MG/DL (ref 0.2–1.1)
BUN BLD-MCNC: 10 MG/DL (ref 9–23)
CALCIUM BLD-MCNC: 9.7 MG/DL (ref 8.7–10.6)
CHLORIDE SERPL-SCNC: 103 MMOL/L (ref 98–112)
CHOLEST SERPL-MCNC: 95 MG/DL (ref ?–200)
CO2 SERPL-SCNC: 26 MMOL/L (ref 21–32)
CREAT BLD-MCNC: 1.07 MG/DL (ref 0.7–1.3)
CREAT UR-SCNC: 145.3 MG/DL
EGFRCR SERPLBLD CKD-EPI 2021: 79 ML/MIN/1.73M2 (ref 60–?)
EST. AVERAGE GLUCOSE BLD GHB EST-MCNC: 134 MG/DL (ref 68–126)
FASTING PATIENT LIPID ANSWER: YES
FASTING STATUS PATIENT QL REPORTED: YES
GLOBULIN PLAS-MCNC: 1.7 G/DL (ref 2–3.5)
GLUCOSE BLD-MCNC: 151 MG/DL (ref 70–99)
HBA1C MFR BLD: 6.3 % (ref ?–5.7)
HDLC SERPL-MCNC: 35 MG/DL (ref 40–59)
LDLC SERPL CALC-MCNC: 33 MG/DL (ref ?–100)
MICROALBUMIN UR-MCNC: 0.8 MG/DL
MICROALBUMIN/CREAT 24H UR-RTO: 5.5 UG/MG (ref ?–30)
NONHDLC SERPL-MCNC: 60 MG/DL (ref ?–130)
OSMOLALITY SERPL CALC.SUM OF ELEC: 290 MOSM/KG (ref 275–295)
POTASSIUM SERPL-SCNC: 4.8 MMOL/L (ref 3.5–5.1)
PROT SERPL-MCNC: 6.5 G/DL (ref 5.7–8.2)
SODIUM SERPL-SCNC: 139 MMOL/L (ref 136–145)
TRIGL SERPL-MCNC: 165 MG/DL (ref 30–149)
TSI SER-ACNC: 1.54 UIU/ML (ref 0.55–4.78)
VLDLC SERPL CALC-MCNC: 22 MG/DL (ref 0–30)

## 2025-08-23 PROCEDURE — 80053 COMPREHEN METABOLIC PANEL: CPT

## 2025-08-23 PROCEDURE — 36415 COLL VENOUS BLD VENIPUNCTURE: CPT

## 2025-08-23 PROCEDURE — 82043 UR ALBUMIN QUANTITATIVE: CPT

## 2025-08-23 PROCEDURE — 84443 ASSAY THYROID STIM HORMONE: CPT

## 2025-08-23 PROCEDURE — 83036 HEMOGLOBIN GLYCOSYLATED A1C: CPT

## 2025-08-23 PROCEDURE — 82570 ASSAY OF URINE CREATININE: CPT

## 2025-08-23 PROCEDURE — 80061 LIPID PANEL: CPT

## 2025-08-24 DIAGNOSIS — E78.2 MIXED HYPERLIPIDEMIA: ICD-10-CM

## 2025-08-24 DIAGNOSIS — E11.9 TYPE 2 DIABETES MELLITUS WITHOUT COMPLICATION, WITHOUT LONG-TERM CURRENT USE OF INSULIN (HCC): ICD-10-CM

## 2025-08-25 RX ORDER — ATORVASTATIN CALCIUM 40 MG/1
40 TABLET, FILM COATED ORAL DAILY
Qty: 90 TABLET | Refills: 0 | Status: SHIPPED | OUTPATIENT
Start: 2025-08-25

## 2025-08-25 RX ORDER — GLIMEPIRIDE 4 MG/1
4 TABLET ORAL 2 TIMES DAILY WITH MEALS
Qty: 180 TABLET | Refills: 0 | Status: SHIPPED | OUTPATIENT
Start: 2025-08-25

## (undated) DIAGNOSIS — E11.9 TYPE 2 DIABETES MELLITUS WITHOUT COMPLICATION, WITHOUT LONG-TERM CURRENT USE OF INSULIN (HCC): ICD-10-CM

## (undated) DIAGNOSIS — C91.10 CLL (CHRONIC LYMPHOCYTIC LEUKEMIA) (HCC): ICD-10-CM

## (undated) DIAGNOSIS — I10 ESSENTIAL HYPERTENSION: ICD-10-CM

## (undated) DIAGNOSIS — E78.2 MIXED HYPERLIPIDEMIA: ICD-10-CM

## (undated) DIAGNOSIS — C91.10 CLL (CHRONIC LYMPHOCYTIC LEUKEMIA) (HCC): Primary | ICD-10-CM

## (undated) NOTE — ED AVS SNAPSHOT
Kwadwo Emanuel   MRN: [id number]    Department:  [facility] Emergency Department in [place]   Date of Visit:  9/1/2019           Disclosure     Insurance plans vary and the physician(s) referred by the ER may not be covered by your plan.  Please contact yo tell this physician (or your personal doctor if your instructions are to return to your personal doctor) about any new or lasting problems. The primary care or specialist physician will see patients referred from the [facility] Emergency Department.  Salvadore Skiff

## (undated) NOTE — MR AVS SNAPSHOT
[address]  [address] [facility] [place] [address] [phone]               Thank you for choosing us for your health care visit with Lindsay Wu MD.  We are glad to serve you and happy to provide you with this childers Blood pressure All men in this age group Every 2 years if your blood pressure is less than 120/80 mm Hg; yearly if your systolic blood pressure is 120 to 139 mm Hg, or your diastolic blood pressure reading is 80 to 89 mm Hg   Colorectal cancer All men in t this infection or vaccine 2 doses; second dose should be given at least 4 weeks after the first dose   Hepatitis A Men at increased risk for infection – talk with your healthcare provider 2 doses given at least 6 months apart   Hepatitis B Men at increased Use of tobacco and the health affects it can cause All men in this age group Every visit   [address]  Date Last Reviewed: 3/30/2015  © 5614-3050 [employer] [facility], [address] [place] [address] [place].  All rights r MetFORMIN HCl 850 MG Tabs   TAKE 1 TABLET BY MOUTH TWO TIMES A DAY WITH MEALS   Commonly known as:  GLUCOPHAGE           Sildenafil Citrate 100 MG Tabs   Take 1 tablet (100 mg total) by mouth as needed for Erectile Dysfunction.    Commonly known as:  Drinda Brunner

## (undated) NOTE — MR AVS SNAPSHOT
[address]  [address] [facility] [address] [phone]               Thank you for choosing us for your health care visit with Pooja Ross PA-C.   We are glad to serve you and happy to provide you with this childers What changed:  Another medication with the same name was removed. Continue taking this medication, and follow the directions you see here. Commonly known as:  LOTENSIN           CENTRUM SILVER ULTRA MENS Tabs   Take  by mouth.            [address]

## (undated) NOTE — ED AVS SNAPSHOT
[facility] Emergency Department in [address] [facility]    Phone:  [phone]    Fax:  [phone]           Chanda Harmon   MRN: [id number]    Department:  [facility] Emergency Department in [place]   Date of Visit:  6 Commonly known as:  ZOFRAN-ODT   Take 1 tablet (8 mg total) by mouth every 6 (six) hours as needed. tamsulosin HCl 0.4 MG Caps   Quantity:  7 capsule   Commonly known as:  FLOMAX   Take 1 capsule (0.4 mg total) by mouth daily.             Where to Get You were examined and treated today on an urgent basis only. This was not a substitute for ongoing medical care. Often, one Emergency Department visit does not uncover every injury or illness.  If you have been referred to a primary care or a specialist ph Domonique Mancini [address] ([address]) [address]  Jada Tapia (Marian Jovel) [phone] [address] ([address]) [phone]                Additional Information       We are concerned for your o workstation to localize potential stones in the cranio-caudal plane. Dose reduction techniques were used.    Dose information is transmitted to the [facility] [employer] of Radiology) [facility] ([address] [place] [address]) which includes the Dose Index Re MyChart     Visit SVXR  You can access your MyChart to more actively manage your health care and view more details from this visit by going to [email or web address].   If you've recently had a stay at the Hospital you can access your [facility]

## (undated) NOTE — LETTER
ASTHMA ACTION PLAN for Raghav Luna     : 1965     Date: 5/3/2019  Provider:  Silva Gillis MD  Phone for doctor or clinic: [facility], [address], [address] [place] [address], [place] [address] [place] [phone]    [facility]

## (undated) NOTE — LETTER
Date: 7/29/2023    Patient Name: Gerry Castaneda  6/22/1965    To Whom it may concern: The above patient was seen at the [facility] for treatment of a diabetes mellitus type 2. We have been managing his diabetes and he is in fair control. We adjusted lifestyle and medication. He will continue to take medication as prescribed and eat a low carb diet with moderate intensity exercise. .    The patient may return to work without limitations.       Sincerely,    DearLENO Boo

## (undated) NOTE — ED AVS SNAPSHOT
[facility] in [address] [place] [address]    Phone:  [phone]    Fax:  [phone]           Enrico Orantes   MRN: [id number]    Department:  [facility] in [place]   Date of Visit:  6 IF THERE IS ANY CHANGE OR WORSENING OF YOUR CONDITION, CALL YOUR PRIMARY CARE PHYSICIAN AT ONCE OR RETURN IMMEDIATELY TO THE EMERGENCY DEPARTMENT.     If you have been prescribed any medication(s), please fill your prescription right away and begin taking t

## (undated) NOTE — Clinical Note
ASTHMA ACTION PLAN for Jaden Paul     : 1965     Date: 2017  Provider:  Eve Brantley MD  Phone for doctor or clinic: [facility], [address] [address], [address] [place] [address]

## (undated) NOTE — LETTER
Date: 1/12/2023    Patient Name: Shaheed Luevano          To Whom it may concern: This letter has been written at the patient's request. The above patient was seen at one of the [facility] locations for treatment of a medical condition. The patient may be excused from work until 1/30/2023.          Sincerely,    Hailey Vazquez MD

## (undated) NOTE — MR AVS SNAPSHOT
[facility]  [address] [facility] [address]  [phone]                    After Visit Summary   10/23/2020    Maggiegreta Samney    MRN: [id number]           Visit Information     Date & Time  10/23/2020  8:30 AM Pro Prochlorperazine Maleate (COMPAZINE) 10 mg tablet TAKE 1 TABLET BY MOUTH EVERY SIX HOURS AS NEEDED FOR NAUSEA    Fluticasone (FLOVENT DISKUS) 250 MCG/BLIST Inhalation Aerosol Powder, Breath Activated INHALE 1 PUFF BY MOUTH TWO TIMES A DAY    folic acid 1 Calcium, Total 9.3      8.5 - 10.1 mg/dL Final    Calculated Osmolality 286      275 - 295 mOsm/kg Final    GFR, Non- 88      >=60  Final    GFR, -American 101      >=60  Final    AST 27      15 - 37 U/L Final    ALT 52      16 - 61 Comment:     Normal HbA1C:     <5.7%      Pre-Diabetic:     5.7 - 6.4%      Diabetic:         >6.4%      Diabetic Control: <7.0%        Estimated Average Glucose 237      68 - 126 mg/dL Final    Comment:    eAG is the estimated average glucose calculated

## (undated) NOTE — LETTER
ASTHMA ACTION PLAN for Denise Treadwell     : 1965     Date: 2019  Provider:  LENO Claudio  Phone for doctor or clinic: [facility], [facility], [address], [address] [place] [address] [phone]    [facility]

## (undated) NOTE — MR AVS SNAPSHOT
After Visit Summary   1/12/2017    Luann Patel    MRN: [id number]           Diagnoses this Visit     Adenopathy    -  Primary     CLL (chronic lymphocytic leukemia) ([facility] [address]         Hepatosplenomegaly         Lymphocytosis           Allergies     Code Jerry Internal:  CBC W/ DIFFERENTIAL        Thursday January 12, 2017     LAB:  PATH COMMENT CBC        Thursday January 12, 2017     LAB:  [facility] MORPHOLOGY SCAN        Thursday January 12, 2017     LAB:  [facility] SLIDE        Friday April 14, 2017 10:00 AM ((4.0 - Albumin) x 0.8 + Calcium    Note: Calculation is only valid when Albumin is less than 4.0g/dL.       Alkaline Phosphatase 77    U/L Final    AST 22  15-41  U/L Final    Alt 30  17-63  U/L Final    Bilirubin, Total 0.4  0.1-2.0  mg/dL Final Component Value Flag Ref Range Units Status    RBC Morphology See morphology below (A) Normal   Final    Platelet Morphology Normal  Normal   Final    Smudge Cells Marked (A) (none)   Final         Result Summary for PATH COMMENT CBC      Component Result

## (undated) NOTE — LETTER
Date: 1/12/2023    Patient Name: Ashley Peers          To Whom it may concern: This letter has been written at the patient's request. The above patient was seen at one of the [address] for treatment of a medical condition. The patient may be excused from work for 3 weeks until 2/2/2023.          Sincerely,    Cristina Hawkins MD

## (undated) NOTE — MR AVS SNAPSHOT
[address] [place]  [phone]                    After Visit Summary   10/4/2019    Gwynda Spurling    MRN: [id number]           Visit Information     Date & Time  10/4/2019 11:00 AM Funmi CLL (chronic lymphocytic leukemia)   [[id number]]  -  Primary           Future Appointments        Provider Department    11/1/2019 1:30 PM [address]    11/7/2019 3:30 PM Jack, [address],

## (undated) NOTE — LETTER
ASTHMA ACTION PLAN for Yanely White     : 1965     Date: 2021  Provider:  Praveen Cartwright MD  Phone for doctor or clinic: [address] [facility], [address] [facility], [address] [place], [place] [address] [facility]  [facility] [address]  [phone]    [facility]

## (undated) NOTE — LETTER
ASTHMA ACTION PLAN for Patricia Guevara     : 1965     Date: 10/18/2018  Provider:  Pepper Schafer MD  Phone for doctor or clinic: [address] [address]  [phone]    [facility]